# Patient Record
Sex: MALE | Race: WHITE | NOT HISPANIC OR LATINO | Employment: OTHER | URBAN - METROPOLITAN AREA
[De-identification: names, ages, dates, MRNs, and addresses within clinical notes are randomized per-mention and may not be internally consistent; named-entity substitution may affect disease eponyms.]

---

## 2018-05-22 LAB — CRC RECOMMENDATION EXT: NORMAL

## 2019-01-31 ENCOUNTER — HISTORICAL (OUTPATIENT)
Dept: ADMINISTRATIVE | Facility: HOSPITAL | Age: 69
End: 2019-01-31

## 2019-12-04 LAB
BUN SERPL-MCNC: 10 MG/DL (ref 7–18)
CALCIUM SERPL-MCNC: 9.3 MG/DL (ref 8.5–10.1)
CHLORIDE SERPL-SCNC: 105 MMOL/L (ref 98–107)
CO2 SERPL-SCNC: 28 MMOL/L (ref 21–32)
CREAT SERPL-MCNC: 0.91 MG/DL (ref 0.6–1.3)
GLUCOSE SERPL-MCNC: 145 MG/DL (ref 74–106)
POTASSIUM SERPL-SCNC: 4.5 MMOL/L (ref 3.5–5.1)
SODIUM SERPL-SCNC: 140 MEQ/L (ref 131–145)

## 2021-05-13 ENCOUNTER — HISTORICAL (OUTPATIENT)
Dept: ADMINISTRATIVE | Facility: HOSPITAL | Age: 71
End: 2021-05-13

## 2021-05-13 LAB
ABS NEUT (OLG): 4.14 X10(3)/MCL (ref 2.1–9.2)
ALBUMIN SERPL-MCNC: 3.8 GM/DL (ref 3.4–4.8)
ALBUMIN/GLOB SERPL: 1.3 RATIO (ref 1.1–2)
ALP SERPL-CCNC: 81 UNIT/L (ref 40–150)
ALT SERPL-CCNC: 20 UNIT/L (ref 0–55)
APPEARANCE, UA: CLEAR
AST SERPL-CCNC: 24 UNIT/L (ref 5–34)
BASOPHILS # BLD AUTO: 0.02 X10(3)/MCL (ref 0–0.2)
BASOPHILS NFR BLD AUTO: 0.3 % (ref 0–0.9)
BILIRUB SERPL-MCNC: 0.4 MG/DL (ref 0.2–1.2)
BILIRUB UR QL STRIP: NEGATIVE
BILIRUBIN DIRECT+TOT PNL SERPL-MCNC: 0.2 MG/DL (ref 0–0.5)
BILIRUBIN DIRECT+TOT PNL SERPL-MCNC: 0.2 MG/DL (ref 0–0.8)
BUN SERPL-MCNC: 11.9 MG/DL (ref 8.4–25.7)
CALCIUM SERPL-MCNC: 9 MG/DL (ref 8.8–10)
CHLORIDE SERPL-SCNC: 107 MMOL/L (ref 98–107)
CHOLEST SERPL-MCNC: 171 MG/DL
CHOLEST/HDLC SERPL: 3 {RATIO} (ref 0–5)
CO2 SERPL-SCNC: 25 MMOL/L (ref 23–31)
COLOR UR: YELLOW
CREAT SERPL-MCNC: 0.83 MG/DL (ref 0.72–1.25)
EOSINOPHIL # BLD AUTO: 0.26 X10(3)/MCL (ref 0–0.9)
EOSINOPHIL NFR BLD AUTO: 3.6 % (ref 0–6.5)
ERYTHROCYTE [DISTWIDTH] IN BLOOD BY AUTOMATED COUNT: 14.5 % (ref 11.5–17)
GLOBULIN SER-MCNC: 3 GM/DL (ref 2.4–3.5)
GLUCOSE (UA): NEGATIVE
GLUCOSE SERPL-MCNC: 106 MG/DL (ref 82–115)
HCT VFR BLD AUTO: 44 % (ref 42–52)
HDLC SERPL-MCNC: 51 MG/DL (ref 40–60)
HGB BLD-MCNC: 14.6 GM/DL (ref 14–18)
HGB UR QL STRIP: NEGATIVE
IMM GRANULOCYTES # BLD AUTO: 0.01 10*3/UL (ref 0–0.02)
IMM GRANULOCYTES NFR BLD AUTO: 0.1 % (ref 0–0.43)
KETONES UR QL STRIP: NEGATIVE
LDLC SERPL CALC-MCNC: 99 MG/DL (ref 50–140)
LEUKOCYTE ESTERASE UR QL STRIP: NEGATIVE
LYMPHOCYTES # BLD AUTO: 2.14 X10(3)/MCL (ref 0.6–4.6)
LYMPHOCYTES NFR BLD AUTO: 29.6 % (ref 16.2–38.3)
MCH RBC QN AUTO: 29.7 PG (ref 27–31)
MCHC RBC AUTO-ENTMCNC: 33.2 GM/DL (ref 33–36)
MCV RBC AUTO: 89.6 FL (ref 80–94)
MONOCYTES # BLD AUTO: 0.66 X10(3)/MCL (ref 0.1–1.3)
MONOCYTES NFR BLD AUTO: 9.1 % (ref 4.7–11.3)
NEUTROPHILS # BLD AUTO: 4.14 X10(3)/MCL (ref 2.1–9.2)
NEUTROPHILS NFR BLD AUTO: 57.3 % (ref 49.1–73.4)
NITRITE UR QL STRIP: NEGATIVE
NRBC BLD AUTO-RTO: 0 % (ref 0–0.2)
PH UR STRIP: 5.5 [PH] (ref 5–7)
PLATELET # BLD AUTO: 196 X10(3)/MCL (ref 130–400)
PMV BLD AUTO: 9.8 FL (ref 7.4–10.4)
POTASSIUM SERPL-SCNC: 4 MMOL/L (ref 3.5–5.1)
PROT SERPL-MCNC: 6.8 GM/DL (ref 5.8–7.6)
PROT UR QL STRIP: NEGATIVE
PSA SERPL-MCNC: 3.28 NG/ML
RBC # BLD AUTO: 4.91 X10(6)/MCL (ref 4.7–6.1)
SODIUM SERPL-SCNC: 139 MMOL/L (ref 136–145)
SP GR UR STRIP: 1.02 (ref 1–1.03)
TRIGL SERPL-MCNC: 104 MG/DL (ref 0–150)
UROBILINOGEN UR STRIP-ACNC: NEGATIVE
VLDLC SERPL CALC-MCNC: 21 MG/DL
WBC # SPEC AUTO: 7.2 X10(3)/MCL (ref 4.5–11.5)

## 2021-09-17 ENCOUNTER — HISTORICAL (OUTPATIENT)
Dept: ADMINISTRATIVE | Facility: HOSPITAL | Age: 71
End: 2021-09-17

## 2022-02-14 ENCOUNTER — HISTORICAL (OUTPATIENT)
Dept: ADMINISTRATIVE | Facility: HOSPITAL | Age: 72
End: 2022-02-14

## 2022-02-14 ENCOUNTER — HISTORICAL (OUTPATIENT)
Dept: RADIOLOGY | Facility: HOSPITAL | Age: 72
End: 2022-02-14

## 2022-02-15 ENCOUNTER — HISTORICAL (OUTPATIENT)
Dept: LAB | Facility: HOSPITAL | Age: 72
End: 2022-02-15

## 2022-02-15 LAB
ABS NEUT (OLG): 4.65 (ref 2.1–9.2)
BASOPHILS # BLD AUTO: 0.02 10*3/UL (ref 0–0.2)
BASOPHILS NFR BLD AUTO: 0.3 % (ref 0–0.9)
BUN SERPL-MCNC: 33.3 MG/DL (ref 8.4–25.7)
CALCIUM SERPL-MCNC: 9.1 MG/DL (ref 8.8–10)
CHLORIDE SERPL-SCNC: 107 MMOL/L (ref 98–107)
CO2 SERPL-SCNC: 28 MMOL/L (ref 23–31)
CREAT SERPL-MCNC: 0.92 MG/DL (ref 0.72–1.25)
CREAT/UREA NIT SERPL: 36
EOSINOPHIL # BLD AUTO: 0.11 10*3/UL (ref 0–0.9)
EOSINOPHIL NFR BLD AUTO: 1.4 % (ref 0–6.5)
ERYTHROCYTE [DISTWIDTH] IN BLOOD BY AUTOMATED COUNT: 14.1 % (ref 11.5–17)
GLUCOSE SERPL-MCNC: 151 MG/DL (ref 82–115)
HCT VFR BLD AUTO: 39.4 % (ref 42–52)
HEMOLYSIS INTERF INDEX SERPL-ACNC: 5
HGB BLD-MCNC: 13.2 G/DL (ref 14–18)
ICTERIC INTERF INDEX SERPL-ACNC: 1
IMM GRANULOCYTES # BLD AUTO: 0.02 10*3/UL (ref 0–0.02)
IMM GRANULOCYTES NFR BLD AUTO: 0.3 % (ref 0–0.43)
LIPEMIC INTERF INDEX SERPL-ACNC: 3
LYMPHOCYTES # BLD AUTO: 2.38 10*3/UL (ref 0.6–4.6)
LYMPHOCYTES NFR BLD AUTO: 30.7 % (ref 16.2–38.3)
MANUAL DIFF? (OHS): NO
MCH RBC QN AUTO: 30.5 PG (ref 27–31)
MCHC RBC AUTO-ENTMCNC: 33.5 G/DL (ref 33–36)
MCV RBC AUTO: 91 FL (ref 80–94)
MONOCYTES # BLD AUTO: 0.58 10*3/UL (ref 0.1–1.3)
MONOCYTES NFR BLD AUTO: 7.5 % (ref 4.7–11.3)
NEUTROPHILS # BLD AUTO: 4.65 10*3/UL (ref 2.1–9.2)
NEUTROPHILS NFR BLD AUTO: 59.8 % (ref 49.1–73.4)
NRBC BLD AUTO-RTO: 0 % (ref 0–0.2)
PLATELET # BLD AUTO: 206 10*3/UL (ref 130–400)
PMV BLD AUTO: 10.9 FL (ref 7.4–10.4)
POTASSIUM SERPL-SCNC: 4.2 MMOL/L (ref 3.5–5.1)
RBC # BLD AUTO: 4.33 10*6/UL (ref 4.7–6.1)
SODIUM SERPL-SCNC: 141 MMOL/L (ref 136–145)
WBC # SPEC AUTO: 7.8 10*3/UL (ref 4.5–11.5)

## 2022-02-18 ENCOUNTER — EXTERNAL HOSPITAL ADMISSION (OUTPATIENT)
Dept: ADMINISTRATIVE | Facility: CLINIC | Age: 72
End: 2022-02-18

## 2022-02-22 ENCOUNTER — HISTORICAL (OUTPATIENT)
Dept: LAB | Facility: HOSPITAL | Age: 72
End: 2022-02-22

## 2022-02-22 LAB
ABS NEUT (OLG): 4.54 (ref 2.1–9.2)
ALBUMIN SERPL-MCNC: 3.7 G/DL (ref 3.4–4.8)
ALBUMIN/GLOB SERPL: 1.2 {RATIO} (ref 1.1–2)
ALP SERPL-CCNC: 84 U/L (ref 40–150)
ALT SERPL-CCNC: 22 U/L (ref 0–55)
AST SERPL-CCNC: 24 U/L (ref 5–34)
BASOPHILS # BLD AUTO: 0.03 10*3/UL (ref 0–0.2)
BASOPHILS NFR BLD AUTO: 0.4 % (ref 0–0.9)
BILIRUB SERPL-MCNC: 0.3 MG/DL (ref 0.2–1.2)
BILIRUBIN DIRECT+TOT PNL SERPL-MCNC: 0.1 (ref 0–0.5)
BILIRUBIN DIRECT+TOT PNL SERPL-MCNC: 0.2 (ref 0–0.8)
BUN SERPL-MCNC: 10.6 MG/DL (ref 8.4–25.7)
CALCIUM SERPL-MCNC: 9.3 MG/DL (ref 8.8–10)
CHLORIDE SERPL-SCNC: 108 MMOL/L (ref 98–107)
CHOLEST SERPL-MCNC: 173 MG/DL
CHOLEST/HDLC SERPL: 4 {RATIO} (ref 0–5)
CO2 SERPL-SCNC: 28 MMOL/L (ref 23–31)
CREAT SERPL-MCNC: 0.88 MG/DL (ref 0.72–1.25)
EOSINOPHIL # BLD AUTO: 0.24 10*3/UL (ref 0–0.9)
EOSINOPHIL NFR BLD AUTO: 3.2 % (ref 0–6.5)
ERYTHROCYTE [DISTWIDTH] IN BLOOD BY AUTOMATED COUNT: 15.3 % (ref 11.5–17)
EST. AVERAGE GLUCOSE BLD GHB EST-MCNC: 111.2 MG/DL
GLOBULIN SER-MCNC: 3.1 G/DL (ref 2.4–3.5)
GLUCOSE SERPL-MCNC: 106 MG/DL (ref 82–115)
HBA1C MFR BLD: 5.5 %
HCT VFR BLD AUTO: 34.6 % (ref 42–52)
HDLC SERPL-MCNC: 49 MG/DL (ref 40–60)
HEMOLYSIS INTERF INDEX SERPL-ACNC: 4
HGB BLD-MCNC: 11.5 G/DL (ref 14–18)
ICTERIC INTERF INDEX SERPL-ACNC: 0
IMM GRANULOCYTES # BLD AUTO: 0.02 10*3/UL (ref 0–0.02)
IMM GRANULOCYTES NFR BLD AUTO: 0.3 % (ref 0–0.43)
LDLC SERPL CALC-MCNC: 98 MG/DL (ref 50–140)
LIPEMIC INTERF INDEX SERPL-ACNC: 8
LYMPHOCYTES # BLD AUTO: 1.97 10*3/UL (ref 0.6–4.6)
LYMPHOCYTES NFR BLD AUTO: 26 % (ref 16.2–38.3)
MANUAL DIFF? (OHS): NO
MCH RBC QN AUTO: 30.8 PG (ref 27–31)
MCHC RBC AUTO-ENTMCNC: 33.2 G/DL (ref 33–36)
MCV RBC AUTO: 92.8 FL (ref 80–94)
MONOCYTES # BLD AUTO: 0.78 10*3/UL (ref 0.1–1.3)
MONOCYTES NFR BLD AUTO: 10.3 % (ref 4.7–11.3)
NEUTROPHILS # BLD AUTO: 4.54 10*3/UL (ref 2.1–9.2)
NEUTROPHILS NFR BLD AUTO: 59.8 % (ref 49.1–73.4)
NRBC BLD AUTO-RTO: 0 % (ref 0–0.2)
PLATELET # BLD AUTO: 242 10*3/UL (ref 130–400)
PMV BLD AUTO: 10.3 FL (ref 7.4–10.4)
POTASSIUM SERPL-SCNC: 4.5 MMOL/L (ref 3.5–5.1)
PROT SERPL-MCNC: 6.8 G/DL (ref 5.8–7.6)
PSA SERPL-MCNC: 4.05 NG/ML
RBC # BLD AUTO: 3.73 10*6/UL (ref 4.7–6.1)
SODIUM SERPL-SCNC: 144 MMOL/L (ref 136–145)
TRIGL SERPL-MCNC: 129 MG/DL (ref 0–150)
VLDLC SERPL CALC-MCNC: 26 MG/DL
WBC # SPEC AUTO: 7.6 10*3/UL (ref 4.5–11.5)

## 2022-04-06 ENCOUNTER — HISTORICAL (OUTPATIENT)
Dept: SURGERY | Facility: HOSPITAL | Age: 72
End: 2022-04-06

## 2022-04-07 ENCOUNTER — HISTORICAL (OUTPATIENT)
Dept: ADMINISTRATIVE | Facility: HOSPITAL | Age: 72
End: 2022-04-07
Payer: MEDICARE

## 2022-04-23 VITALS
DIASTOLIC BLOOD PRESSURE: 74 MMHG | WEIGHT: 151.19 LBS | HEIGHT: 62 IN | SYSTOLIC BLOOD PRESSURE: 121 MMHG | BODY MASS INDEX: 27.82 KG/M2 | OXYGEN SATURATION: 99 %

## 2022-04-28 RX ORDER — TIZANIDINE HYDROCHLORIDE 2 MG/1
CAPSULE, GELATIN COATED ORAL
COMMUNITY
End: 2022-06-02 | Stop reason: SDUPTHER

## 2022-04-28 RX ORDER — ATORVASTATIN CALCIUM 10 MG/1
10 TABLET, FILM COATED ORAL NIGHTLY
COMMUNITY
End: 2023-01-31 | Stop reason: SDUPTHER

## 2022-04-28 RX ORDER — GABAPENTIN 100 MG/1
100 CAPSULE ORAL 3 TIMES DAILY
COMMUNITY
End: 2022-05-20

## 2022-04-28 RX ORDER — ONDANSETRON 4 MG/1
4 TABLET, ORALLY DISINTEGRATING ORAL ONCE
COMMUNITY
End: 2022-06-02 | Stop reason: SDUPTHER

## 2022-04-28 RX ORDER — ASCORBIC ACID 250 MG
1 TABLET,CHEWABLE ORAL DAILY
COMMUNITY

## 2022-04-28 RX ORDER — BUDESONIDE AND FORMOTEROL FUMARATE DIHYDRATE 160; 4.5 UG/1; UG/1
2 AEROSOL RESPIRATORY (INHALATION) EVERY 12 HOURS
COMMUNITY
End: 2022-05-13 | Stop reason: SDUPTHER

## 2022-04-28 RX ORDER — CHOLECALCIFEROL (VITAMIN D3) 25 MCG
1000 TABLET ORAL DAILY
COMMUNITY

## 2022-04-28 RX ORDER — FLUTICASONE PROPIONATE AND SALMETEROL 100; 50 UG/1; UG/1
1 POWDER RESPIRATORY (INHALATION) 2 TIMES DAILY
COMMUNITY
End: 2022-09-30

## 2022-04-28 RX ORDER — PANTOPRAZOLE SODIUM 40 MG/1
40 TABLET, DELAYED RELEASE ORAL 2 TIMES DAILY
COMMUNITY
End: 2022-05-09

## 2022-05-04 ENCOUNTER — ANESTHESIA (OUTPATIENT)
Dept: SURGERY | Facility: HOSPITAL | Age: 72
End: 2022-05-04
Payer: MEDICARE

## 2022-05-04 ENCOUNTER — HOSPITAL ENCOUNTER (OUTPATIENT)
Facility: HOSPITAL | Age: 72
Discharge: HOME | End: 2022-05-04
Attending: ANESTHESIOLOGY | Admitting: ANESTHESIOLOGY
Payer: MEDICARE

## 2022-05-04 ENCOUNTER — ANESTHESIA EVENT (OUTPATIENT)
Dept: SURGERY | Facility: HOSPITAL | Age: 72
End: 2022-05-04
Payer: MEDICARE

## 2022-05-04 ENCOUNTER — HOSPITAL ENCOUNTER (OUTPATIENT)
Dept: RADIOLOGY | Facility: HOSPITAL | Age: 72
Discharge: HOME | End: 2022-05-04
Attending: ANESTHESIOLOGY
Payer: MEDICARE

## 2022-05-04 DIAGNOSIS — M54.9 CHRONIC BACK PAIN GREATER THAN 3 MONTHS DURATION: Primary | ICD-10-CM

## 2022-05-04 DIAGNOSIS — R52 PAIN: ICD-10-CM

## 2022-05-04 DIAGNOSIS — G89.29 CHRONIC BACK PAIN GREATER THAN 3 MONTHS DURATION: Primary | ICD-10-CM

## 2022-05-04 PROCEDURE — 63600175 PHARM REV CODE 636 W HCPCS: Performed by: NURSE ANESTHETIST, CERTIFIED REGISTERED

## 2022-05-04 PROCEDURE — 71000015 HC POSTOP RECOV 1ST HR: Performed by: ANESTHESIOLOGY

## 2022-05-04 PROCEDURE — 64483 NJX AA&/STRD TFRM EPI L/S 1: CPT | Performed by: ANESTHESIOLOGY

## 2022-05-04 PROCEDURE — 64484 NJX AA&/STRD TFRM EPI L/S EA: CPT | Mod: LT,,, | Performed by: ANESTHESIOLOGY

## 2022-05-04 PROCEDURE — 62323 NJX INTERLAMINAR LMBR/SAC: CPT | Performed by: ANESTHESIOLOGY

## 2022-05-04 PROCEDURE — 36000704 HC OR TIME LEV I 1ST 15 MIN: Performed by: ANESTHESIOLOGY

## 2022-05-04 PROCEDURE — 37000008 HC ANESTHESIA 1ST 15 MINUTES: Performed by: ANESTHESIOLOGY

## 2022-05-04 PROCEDURE — 64483 NJX AA&/STRD TFRM EPI L/S 1: CPT | Mod: LT,,, | Performed by: ANESTHESIOLOGY

## 2022-05-04 PROCEDURE — 64484 PRA INJECT ANES/STEROID FORAMEN LUMBAR/SACRAL W IMG GUIDE ,EA ADD LEVEL: ICD-10-PCS | Mod: LT,,, | Performed by: ANESTHESIOLOGY

## 2022-05-04 PROCEDURE — 76000 FLUOROSCOPY <1 HR PHYS/QHP: CPT | Mod: TC

## 2022-05-04 PROCEDURE — 64483 PR EPIDURAL INJ, ANES/STEROID, TRANSFORAMINAL, LUMB/SACR, SNGL LEVL: ICD-10-PCS | Mod: LT,,, | Performed by: ANESTHESIOLOGY

## 2022-05-04 RX ORDER — HYDROMORPHONE HYDROCHLORIDE 2 MG/ML
0.2 INJECTION, SOLUTION INTRAMUSCULAR; INTRAVENOUS; SUBCUTANEOUS EVERY 5 MIN PRN
Status: CANCELLED | OUTPATIENT
Start: 2022-05-04

## 2022-05-04 RX ORDER — SODIUM CHLORIDE 0.9 % (FLUSH) 0.9 %
10 SYRINGE (ML) INJECTION
Status: DISCONTINUED | OUTPATIENT
Start: 2022-05-04 | End: 2022-05-04 | Stop reason: HOSPADM

## 2022-05-04 RX ORDER — SODIUM CHLORIDE, SODIUM GLUCONATE, SODIUM ACETATE, POTASSIUM CHLORIDE AND MAGNESIUM CHLORIDE 30; 37; 368; 526; 502 MG/100ML; MG/100ML; MG/100ML; MG/100ML; MG/100ML
1000 INJECTION, SOLUTION INTRAVENOUS CONTINUOUS
Status: DISCONTINUED | OUTPATIENT
Start: 2022-05-04 | End: 2022-05-04 | Stop reason: HOSPADM

## 2022-05-04 RX ORDER — PROPOFOL 10 MG/ML
INJECTION, EMULSION INTRAVENOUS
Status: DISCONTINUED | OUTPATIENT
Start: 2022-05-04 | End: 2022-05-04

## 2022-05-04 RX ORDER — LIDOCAINE HYDROCHLORIDE 10 MG/ML
1 INJECTION, SOLUTION EPIDURAL; INFILTRATION; INTRACAUDAL; PERINEURAL ONCE
Status: DISCONTINUED | OUTPATIENT
Start: 2022-05-04 | End: 2022-05-04 | Stop reason: HOSPADM

## 2022-05-04 RX ORDER — LIDOCAINE HCL/PF 100 MG/5ML
SYRINGE (ML) INTRAVENOUS
Status: DISCONTINUED | OUTPATIENT
Start: 2022-05-04 | End: 2022-05-04

## 2022-05-04 RX ORDER — BUPIVACAINE HYDROCHLORIDE 2.5 MG/ML
INJECTION, SOLUTION EPIDURAL; INFILTRATION; INTRACAUDAL
Status: DISCONTINUED
Start: 2022-05-04 | End: 2022-05-04 | Stop reason: HOSPADM

## 2022-05-04 RX ORDER — LIDOCAINE HYDROCHLORIDE 10 MG/ML
INJECTION, SOLUTION EPIDURAL; INFILTRATION; INTRACAUDAL; PERINEURAL
Status: DISCONTINUED
Start: 2022-05-04 | End: 2022-05-04 | Stop reason: HOSPADM

## 2022-05-04 RX ORDER — MIDAZOLAM HYDROCHLORIDE 1 MG/ML
2 INJECTION INTRAMUSCULAR; INTRAVENOUS ONCE AS NEEDED
Status: DISCONTINUED | OUTPATIENT
Start: 2022-05-04 | End: 2022-05-04 | Stop reason: HOSPADM

## 2022-05-04 RX ORDER — ONDANSETRON 2 MG/ML
4 INJECTION INTRAMUSCULAR; INTRAVENOUS DAILY PRN
Status: CANCELLED | OUTPATIENT
Start: 2022-05-04

## 2022-05-04 RX ORDER — DEXAMETHASONE SODIUM PHOSPHATE 10 MG/ML
INJECTION INTRAMUSCULAR; INTRAVENOUS
Status: DISCONTINUED
Start: 2022-05-04 | End: 2022-05-04 | Stop reason: HOSPADM

## 2022-05-04 RX ADMIN — PROPOFOL 50 MG: 10 INJECTION, EMULSION INTRAVENOUS at 09:05

## 2022-05-04 RX ADMIN — Medication 40 MG: at 09:05

## 2022-05-04 NOTE — TRANSFER OF CARE
"Anesthesia Transfer of Care Note    Patient: Reji Dozier    Procedure(s) Performed: Procedure(s) (LRB):  INJECTION, STEROID, EPIDURAL, TRANSFORAMINAL APPROACH L3 (Bilateral)    Patient location: OPS    Anesthesia Type: MAC    Transport from OR: Transported from OR on room air with adequate spontaneous ventilation    Post pain: adequate analgesia    Post assessment: no apparent anesthetic complications    Post vital signs: stable    Level of consciousness: sedated    Nausea/Vomiting: no nausea/vomiting    Complications: none    Transfer of care protocol was followed      Last vitals:   Visit Vitals  /82   Pulse 65   Temp 36.1 °C (97 °F) (Tympanic)   Resp 20   Ht 5' 2" (1.575 m)   Wt 68.4 kg (150 lb 12.7 oz)   BMI 27.58 kg/m²     "

## 2022-05-04 NOTE — DISCHARGE INSTRUCTIONS
Please call your Dr. If you have any problems with pain, bleeding, any signs of infection such as fever 102 or greater, procedure area redness, swelling, drainage, hard or hot to touch or anything of concern. Keep dressing clean, dry and intact till tomorrow evening. You can remove it at that time and shower. Do not tub bath for a few days. Do not submerge injection area. No powders, lotions or creams to injection area. Light activity for a few days. Use ice for pain and swelling. Do not use heat. No driving or legal decisions for 24 hours.

## 2022-05-04 NOTE — ANESTHESIA POSTPROCEDURE EVALUATION
Anesthesia Post Evaluation    Patient: Reji Dozier    Procedure(s) Performed: Procedure(s) (LRB):  INJECTION, STEROID, EPIDURAL, TRANSFORAMINAL APPROACH L3 (Bilateral)    Final Anesthesia Type: general      Patient location during evaluation: PACU  Patient participation: Yes- Able to Participate  Level of consciousness: awake and alert  Post-procedure vital signs: reviewed and stable  Pain management: adequate  Airway patency: patent      Anesthetic complications: no      Cardiovascular status: blood pressure returned to baseline  Respiratory status: unassisted  Hydration status: euvolemic  Follow-up not needed.          Vitals Value Taken Time   /68 05/04/22 1010   Temp 97.5 05/04/22 1243   Pulse 77 05/04/22 1010   Resp 18 05/04/22 1010   SpO2 99 % 05/04/22 1010         No case tracking events are documented in the log.      Pain/Tucker Score: Tucker Score: 10 (5/4/2022 10:15 AM)  Modified Tucker Score: 20 (5/4/2022 10:15 AM)

## 2022-05-04 NOTE — ANESTHESIA PREPROCEDURE EVALUATION
05/04/2022  Reji Dozier is a 71 y.o., male with chronic back pain for repeat epidural steroid injection.  He has done well with similar procedures in the recent past.  He is feeling well today, denies cardiopulmonary complaints..      Pre-op Assessment    I have reviewed the Patient Summary Reports.     I have reviewed the Nursing Notes. I have reviewed the NPO Status.   I have reviewed the Medications.     Review of Systems  Anesthesia Hx:  No problems with previous Anesthesia  Denies Family Hx of Anesthesia complications.   Denies Personal Hx of Anesthesia complications.   Cardiovascular:   Exercise tolerance: good    Pulmonary:   COPD, mild Asthma    Neurological:   Neuromuscular Disease,        Physical Exam  General: Well nourished, Cooperative, Alert and Oriented    Airway:  Mallampati: II   Mouth Opening: Normal  TM Distance: Normal  Tongue: Normal  Neck ROM: Normal ROM    Dental:  Intact    Chest/Lungs:  Clear to auscultation, Normal Respiratory Rate    Heart:  Rate: Normal  Rhythm: Regular Rhythm        Anesthesia Plan  Type of Anesthesia, risks & benefits discussed:    Anesthesia Type: Gen Natural Airway  Intra-op Monitoring Plan: Standard ASA Monitors  Post Op Pain Control Plan: multimodal analgesia  Induction:  IV  ASA Score: 3  Day of Surgery Review of History & Physical: H&P Update referred to the surgeon/provider.    Ready For Surgery From Anesthesia Perspective.     .      
PROVIDER:[TOKEN:[[id number]],FOLLOWUP:[1 month]]

## 2022-05-04 NOTE — OP NOTE
Procedure:  1.   Left L3 transforaminal epidural steroid injection  2.   Right L3 transforaminal epidural steroid injection    Pre-Procedure Diagnoses:  1. Chronic back pain greater than 3 months  2. Lumbar degenerative disc disease  3. Lumbar spondylosis  4. Lumbar disc displacement    Post-Procedure Diagnoses:  1. Chronic back pain greater than 3 months  2. Lumbar degenerative disc disease  3. Lumbar spondylosis  4. Lumbar disc displacement    Anesthesia:  Local and MAC    Estimated Blood Loss:  < 2 ML    Consent:  The procedure, risks, benefits, and alternatives were discussed with the patient.  The patient voiced understanding and fully informed written consent was obtained.    Description of the Procedure:  The patient was taken to the operating room and placed in the prone position. The skin overlying the lumbar spine was prepped with Chloraprep and draped in the usual sterile fashion.  An oblique fluoroscopic view was obtained on the left side at L3, with the superior articular process of the inferior vertebral body aligned with the pedicle.  Skin anesthesia was achieved using 2 mL of lidocaine 1%.  A 22-gauge 3.5-inch Quinke spinal needle was inserted and advanced under intermittent fluoroscopic views into the epidural space. Proper needle position was confirmed under AP, oblique, and lateral fluoroscopic views.  Negative aspiration for blood or CSF was confirmed. 1 mL of contrast was injected, which revealed spread into the epidural space.  Then a combination of 5 mg of dexamethasone with 1 mL of 0.25% bupivacaine was easily injected.  There was no pain on injection. The needle was removed intact and bleeding was nil.  The same procedure was repeated in identical fashion on the right side at L3. Sterile bandages were applied. The patient was taken to the recovery room for further observation in stable condition. The patient was then discharged home with no complications.

## 2022-05-05 VITALS
WEIGHT: 150.81 LBS | SYSTOLIC BLOOD PRESSURE: 130 MMHG | OXYGEN SATURATION: 99 % | TEMPERATURE: 97 F | DIASTOLIC BLOOD PRESSURE: 68 MMHG | RESPIRATION RATE: 18 BRPM | HEART RATE: 77 BPM | HEIGHT: 62 IN | BODY MASS INDEX: 27.75 KG/M2

## 2022-05-09 DIAGNOSIS — J44.9 COPD, MILD: Primary | ICD-10-CM

## 2022-05-09 RX ORDER — PANTOPRAZOLE SODIUM 40 MG/1
40 TABLET, DELAYED RELEASE ORAL DAILY
Qty: 90 TABLET | Refills: 3 | Status: SHIPPED | OUTPATIENT
Start: 2022-05-09 | End: 2022-06-24

## 2022-05-10 RX ORDER — AZITHROMYCIN 250 MG/1
TABLET, FILM COATED ORAL
Qty: 6 TABLET | Refills: 0 | Status: SHIPPED | OUTPATIENT
Start: 2022-05-10 | End: 2022-05-15

## 2022-05-10 RX ORDER — METHYLPREDNISOLONE 4 MG/1
TABLET ORAL
Qty: 21 EACH | Refills: 0 | Status: SHIPPED | OUTPATIENT
Start: 2022-05-10 | End: 2022-05-31

## 2022-05-10 RX ORDER — PROMETHAZINE HYDROCHLORIDE AND CODEINE PHOSPHATE 6.25; 1 MG/5ML; MG/5ML
5 SOLUTION ORAL EVERY 6 HOURS PRN
Qty: 120 ML | Refills: 0 | Status: SHIPPED | OUTPATIENT
Start: 2022-05-10 | End: 2022-05-20

## 2022-05-10 RX ORDER — IPRATROPIUM BROMIDE AND ALBUTEROL SULFATE 2.5; .5 MG/3ML; MG/3ML
3 SOLUTION RESPIRATORY (INHALATION) EVERY 6 HOURS PRN
Qty: 75 ML | Refills: 0 | Status: SHIPPED | OUTPATIENT
Start: 2022-05-10 | End: 2022-05-13 | Stop reason: SDUPTHER

## 2022-05-10 NOTE — TELEPHONE ENCOUNTER
Patient is requesting a refill of cough syrup   He has a cough, his sinus is dripping and he is little congested . Denies Fever. He tested negative for Covid (At Home Test)      ----- Message from Capo Lieberman MA sent at 5/10/2022  2:09 PM CDT -----  Regarding: medical advice  .Type:  Needs Medical Advice    Who Called: patient   Symptoms (please be specific): cough, congestion, body aches  How long has patient had these symptoms:  day before yesterday   Pharmacy name and phone #:  [phone] [facility]   Would the patient rather a call back or a response via [facility]? Call   Best Call Back Number: [phone]  Additional Information: patient stated wife was tested for covid a few days ago, with the same symptoms and was negative, he has been tested as well at least one week ago, but having symptoms, patient wants to know if he can get a nurse to call him back, or have medication called in, and also requesting for cough medication.

## 2022-05-10 NOTE — TELEPHONE ENCOUNTER
I have signed for the following orders AND/OR meds. Please notify the patient and ask the patient to schedule the testing and/or information about any medications that were sent.      Medications Ordered This Encounter   Medications    albuterol-ipratropium (DUO-NEB) 2.5 mg-0.5 mg/3 mL nebulizer solution     Sig: Take 3 mLs by nebulization every 6 (six) hours as needed for Wheezing. Rescue     Dispense:  75 mL     Refill:  0    azithromycin (Z-PAVEL) 250 MG tablet     Sig: Take 2 tablets by mouth on day 1; Take 1 tablet by mouth on days 2-5     Dispense:  6 tablet     Refill:  0    methylPREDNISolone (MEDROL DOSEPACK) 4 mg tablet     Sig: use as directed     Dispense:  21 each     Refill:  0    pantoprazole (PROTONIX) 40 MG tablet     Sig: Take 1 tablet (40 mg total) by mouth once daily.     Dispense:  90 tablet     Refill:  3    promethazine-codeine 6.25-10 mg/5 ml (PHENERGAN WITH CODEINE) 6.25-10 mg/5 mL syrup     Sig: Take 5 mLs by mouth every 6 (six) hours as needed for Cough.     Dispense:  120 mL     Refill:  0     Quantity prescribed more than 7 day supply? Yes, quantity medically necessary     Order Specific Question:   I have reviewed the Prescription Drug Monitoring Program (PDMP) database for this patient prior to prescribing the above opioid medication     Answer:   Yes     No orders of the defined types were placed in this encounter.

## 2022-05-11 DIAGNOSIS — J44.9 COPD, MILD: ICD-10-CM

## 2022-05-11 RX ORDER — FLUTICASONE PROPIONATE 50 MCG
SPRAY, SUSPENSION (ML) NASAL
Qty: 48 ML | Refills: 3 | Status: SHIPPED | OUTPATIENT
Start: 2022-05-11 | End: 2022-05-11 | Stop reason: SDUPTHER

## 2022-05-11 NOTE — TELEPHONE ENCOUNTER
Patient has refills at the pharmacy for Albuterol per rx sent in Powerchart   Tried contacting patient. No answer    ----- Message from Capo Lieberman MA sent at 5/11/2022  3:53 PM CDT -----  Regarding: medication refill  Patient stated he needs to get his breathing medication supplies       .Type:  RX Refill Request    Who Called: patient   Refill or New Rx: refill   RX Name and Strength: Flonase   How is the patient currently taking it? (ex. 1XDay): 2x daily   Is this a 30 day or 90 day RX: 30, but normally gets it 3 at a time   Preferred Pharmacy with phone number: [phone]   Local or Mail Order:local   Ordering Provider:Dr. Welch   Would the patient rather a call back or a response via [facility]? Call   Best Call Back Number:[phone]  Additional Information: patient need refill        ..Type:  RX Refill Request    Who Called:  patient   Refill or New Rx: refill   RX Name and Strength: albuterol   How is the patient currently taking it? (ex. 1XDay): daily use   Is this a 30 day or 90 day RX: 30   Preferred Pharmacy with phone number: [phone]   Local or Mail Order: local   Ordering Provider: Dr. Welch   Would the patient rather a call back or a response via [facility]? Call   Best Call Back Number:[phone]  Additional Information: patient need refill

## 2022-05-13 RX ORDER — FLUTICASONE PROPIONATE 50 MCG
SPRAY, SUSPENSION (ML) NASAL
Qty: 48 ML | Refills: 3 | Status: SHIPPED | OUTPATIENT
Start: 2022-05-13 | End: 2023-08-03

## 2022-05-13 RX ORDER — ALBUTEROL SULFATE 90 UG/1
2 AEROSOL, METERED RESPIRATORY (INHALATION) EVERY 6 HOURS PRN
Qty: 18 G | Refills: 1 | Status: SHIPPED | OUTPATIENT
Start: 2022-05-13 | End: 2022-05-16 | Stop reason: SDUPTHER

## 2022-05-13 RX ORDER — IPRATROPIUM BROMIDE AND ALBUTEROL SULFATE 2.5; .5 MG/3ML; MG/3ML
3 SOLUTION RESPIRATORY (INHALATION) EVERY 6 HOURS PRN
Qty: 75 ML | Refills: 0 | Status: SHIPPED | OUTPATIENT
Start: 2022-05-13 | End: 2022-05-16

## 2022-05-13 RX ORDER — BUDESONIDE AND FORMOTEROL FUMARATE DIHYDRATE 160; 4.5 UG/1; UG/1
2 AEROSOL RESPIRATORY (INHALATION) EVERY 12 HOURS
Qty: 10.2 G | Refills: 3 | Status: SHIPPED | OUTPATIENT
Start: 2022-05-13 | End: 2022-11-07

## 2022-05-13 NOTE — TELEPHONE ENCOUNTER
I have signed for the following orders AND/OR meds. Please notify the patient and ask the patient to schedule the testing and/or information about any medications that were sent.      Medications Ordered This Encounter   Medications    albuterol (PROAIR HFA) 90 mcg/actuation inhaler     Sig: Inhale 2 puffs into the lungs every 6 (six) hours as needed for Wheezing.     Dispense:  18 g     Refill:  1    albuterol-ipratropium (DUO-NEB) 2.5 mg-0.5 mg/3 mL nebulizer solution     Sig: Take 3 mLs by nebulization every 6 (six) hours as needed for Wheezing. Rescue     Dispense:  75 mL     Refill:  0    budesonide-formoterol 160-4.5 mcg (SYMBICORT) 160-4.5 mcg/actuation HFAA     Sig: Inhale 2 puffs into the lungs every 12 (twelve) hours. Controller     Dispense:  10.2 g     Refill:  3    fluticasone propionate (FLONASE) 50 mcg/actuation nasal spray     Sig: SPRAY 2 SPRAYS INTO EACH NOSTRIL TWICE A DAY     Dispense:  48 mL     Refill:  3     No orders of the defined types were placed in this encounter.

## 2022-05-16 DIAGNOSIS — J44.9 COPD, MILD: ICD-10-CM

## 2022-05-16 RX ORDER — IPRATROPIUM BROMIDE AND ALBUTEROL SULFATE 2.5; .5 MG/3ML; MG/3ML
3 SOLUTION RESPIRATORY (INHALATION) EVERY 6 HOURS PRN
Qty: 90 ML | Refills: 0 | Status: SHIPPED | OUTPATIENT
Start: 2022-05-16 | End: 2022-05-16

## 2022-05-16 RX ORDER — IPRATROPIUM BROMIDE AND ALBUTEROL SULFATE 2.5; .5 MG/3ML; MG/3ML
3 SOLUTION RESPIRATORY (INHALATION) EVERY 6 HOURS PRN
Qty: 75 ML | Refills: 0 | Status: CANCELLED | OUTPATIENT
Start: 2022-05-16 | End: 2023-05-13

## 2022-05-16 RX ORDER — IPRATROPIUM BROMIDE AND ALBUTEROL SULFATE 2.5; .5 MG/3ML; MG/3ML
3 SOLUTION RESPIRATORY (INHALATION)
COMMUNITY
Start: 2021-05-19 | End: 2022-05-16

## 2022-05-16 RX ORDER — IPRATROPIUM BROMIDE AND ALBUTEROL SULFATE 2.5; .5 MG/3ML; MG/3ML
3 SOLUTION RESPIRATORY (INHALATION) EVERY 6 HOURS PRN
Qty: 75 ML | Refills: 0 | Status: SHIPPED | OUTPATIENT
Start: 2022-05-16 | End: 2022-05-16

## 2022-05-16 NOTE — TELEPHONE ENCOUNTER
Patient is requesting a refill of the following:  Ipratropium Bromide 0.5 mg and Albuterol Sulfate 3 mg

## 2022-05-20 RX ORDER — GABAPENTIN 100 MG/1
100 CAPSULE ORAL DAILY
Qty: 30 CAPSULE | Refills: 6 | Status: SHIPPED | OUTPATIENT
Start: 2022-05-20 | End: 2023-01-24

## 2022-05-20 NOTE — TELEPHONE ENCOUNTER
Type:  RX Refill Request    Who Called: self  Refill or New Rx:refill  RX Name and Strength:promethazine-codeine    How is the patient currently taking it? (ex. 1XDay):segun  Is this a 30 day or 90 day RX:segun  Preferred Pharmacy with phone number:[facility]  Local or Mail Order:local  Ordering Provider:segun  Would the patient rather a call back or a response via [facility]? Call back  Best Call Back Number:[phone]  Additional Information: S

## 2022-05-21 NOTE — HISTORICAL [facility] CERNER
This is a historical note converted from Adri. Formatting and pictures may have been removed.  Please reference Adri for original formatting and attached multimedia. Procedure Name  1. Left L3?Transforaminal Epidural Steroid Injection  2. Right L3 Transforaminal Epidural Steroid Injection  ?  Pre-Procedure Diagnoses:  1. Chronic pain syndrome  2. Low back pain  3. Lumbar radiculopathy  4. Lumbar disc displacement  5. Lumbar degenerative disc disease  ?   Post-Procedure Diagnoses:  1. Chronic pain syndrome  2. Low back pain  3. Lumbar radiculopathy  4. Lumbar disc displacement  5. Lumbar degenerative disc disease  ?   Anesthesia:  Local and MAC  ?   Estimated Blood Loss:  None  ?  Complications:  None  ?  Informed Consent:  The procedure, risks, benefits, and alternatives were discussed with the patient.? There were no contraindications to the procedure.? The patient expressed understanding and agreed to proceed.? Fully informed written consent was obtained.?  ?  Description of the Procedure:  The patient was taken to the operating room.? IV access was obtained prior to the start of the procedure.? The patient was positioned prone on the fluoroscopy table.? Continuous hemodynamic monitoring was initiated and continued throughout the duration of the procedure.? The skin overlying the lumbosacral spine was prepped with Chloraprep and draped into a sterile field.? An oblique fluoroscopic view was obtained on the left side at L3, with the superior articular process of the inferior vertebral body aligned with the pedicle.? Skin anesthesia was achieved using 1 mL of 1% lidocaine.? A 22-gauge 3.5-inch Quinke spinal needle was slowly inserted and advanced towards the 6 oclock position of the pedicle and into the epidural space.? Proper needle position was confirmed under AP, oblique, and lateral fluoroscopic views.? Negative aspiration for blood or CSF was confirmed.? 0.5 mL of Isovue contrast was injected.? Fluoroscopic  imaging revealed a clear outline of the spinal nerve with proximal spread of agent through the neural foramen and into the epidural space.? Then a combination of 5 mg of dexamethasone and 1 mL of 0.25% bupivacaine was easily injected.? There was no pain on injection.? The needle was removed intact and bleeding was nil.? The same procedure was repeated in identical fashion on the right side at L3. Sterile bandages were applied.? The patient was taken to the recovery room for further observation in stable condition.? The patient was then discharged home without any complications.

## 2022-05-23 ENCOUNTER — TELEPHONE (OUTPATIENT)
Dept: FAMILY MEDICINE | Facility: CLINIC | Age: 72
End: 2022-05-23
Payer: MEDICARE

## 2022-05-23 DIAGNOSIS — R97.20 ELEVATED PROSTATE SPECIFIC ANTIGEN (PSA): Primary | ICD-10-CM

## 2022-05-23 DIAGNOSIS — Z12.5 ENCOUNTER FOR SCREENING FOR MALIGNANT NEOPLASM OF PROSTATE: ICD-10-CM

## 2022-05-23 DIAGNOSIS — R97.20 ELEVATED PSA: Primary | ICD-10-CM

## 2022-05-23 DIAGNOSIS — R97.20 ELEVATED PSA: ICD-10-CM

## 2022-05-24 RX ORDER — HYDROCODONE POLISTIREX AND CHLORPHENIRAMINE POLISTIREX 10; 8 MG/5ML; MG/5ML
5 SUSPENSION, EXTENDED RELEASE ORAL NIGHTLY PRN
Qty: 115 ML | Refills: 0 | Status: SHIPPED | OUTPATIENT
Start: 2022-05-24 | End: 2022-05-31

## 2022-05-24 NOTE — TELEPHONE ENCOUNTER
Noted     ----- Message from Wade Ahn sent at 5/24/2022  7:40 AM CDT -----  .Type:  Needs Medical Advice    Who Called: Reji  Symptoms (please be specific):    How long has patient had these symptoms:    Pharmacy name and phone #:    Would the patient rather a call back or a response via [facility]?   Best Call Back Number: [phone]  Additional Information: The patient wanted to let the office know he put a letter under the office door this morning.

## 2022-05-24 NOTE — TELEPHONE ENCOUNTER
I have signed for the following orders AND/OR meds. Please notify the patient and ask the patient to schedule the testing and/or information about any medications that were sent.      Medications Ordered This Encounter   Medications    hydrocodone-chlorpheniramine (TUSSIONEX) 10-8 mg/5 mL suspension     Sig: Take 5 mLs by mouth nightly as needed for Cough.     Dispense:  115 mL     Refill:  0     Quantity prescribed more than 7 day supply? Yes, quantity medically necessary     Order Specific Question:   I have reviewed the Prescription Drug Monitoring Program (PDMP) database for this patient prior to prescribing the above opioid medication     Answer:   Yes     No orders of the defined types were placed in this encounter.      Please call patient's pharmacy  -confirm they have 90 days Rx for symbicort, fluticasone and nebulizer dispensed.  -if not, then I will need to send, please help to propose  -once completed please call patient to update him of what we have done to correct the problems and please let him know that I apologize for the inconvenience

## 2022-05-25 ENCOUNTER — TELEPHONE (OUTPATIENT)
Dept: FAMILY MEDICINE | Facility: CLINIC | Age: 72
End: 2022-05-25
Payer: MEDICARE

## 2022-05-25 DIAGNOSIS — J44.9 COPD, MILD: ICD-10-CM

## 2022-05-25 NOTE — TELEPHONE ENCOUNTER
Please call patient pharmacy  -does he have 90 day supply of his inhalers and nebulizer solution? If not, please verbal 90 day supply and 3 refills  thanks

## 2022-05-25 NOTE — TELEPHONE ENCOUNTER
----- Message from Beulah Benítez MA sent at 5/25/2022 11:44 AM CDT -----  Regarding: FW: Advice    ----- Message -----  From: Christine Palma  Sent: 5/25/2022   8:46 AM CDT  To: Rebekah Núñez Staff  Subject: Advice                                           Type:  Needs Medical Advice    Who Called: Patient  Best Call Back Number: [phone]  Additional Information: Just wanted to say thank you to Dr. Welch for refilling his cough medication he has shown great improvement.

## 2022-05-26 ENCOUNTER — OFFICE VISIT (OUTPATIENT)
Dept: ORTHOPEDICS | Facility: CLINIC | Age: 72
End: 2022-05-26
Payer: MEDICARE

## 2022-05-26 ENCOUNTER — OFFICE VISIT (OUTPATIENT)
Dept: FAMILY MEDICINE | Facility: CLINIC | Age: 72
End: 2022-05-26
Payer: MEDICARE

## 2022-05-26 VITALS
HEIGHT: 62 IN | DIASTOLIC BLOOD PRESSURE: 86 MMHG | WEIGHT: 150 LBS | BODY MASS INDEX: 27.6 KG/M2 | SYSTOLIC BLOOD PRESSURE: 161 MMHG | HEART RATE: 80 BPM

## 2022-05-26 VITALS
WEIGHT: 154 LBS | BODY MASS INDEX: 27.29 KG/M2 | HEIGHT: 63 IN | DIASTOLIC BLOOD PRESSURE: 71 MMHG | HEART RATE: 72 BPM | TEMPERATURE: 99 F | SYSTOLIC BLOOD PRESSURE: 156 MMHG

## 2022-05-26 DIAGNOSIS — Z00.00 WELLNESS EXAMINATION: ICD-10-CM

## 2022-05-26 DIAGNOSIS — G89.29 CHRONIC BACK PAIN GREATER THAN 3 MONTHS DURATION: Primary | ICD-10-CM

## 2022-05-26 DIAGNOSIS — M51.26 DISC DISPLACEMENT, LUMBAR: ICD-10-CM

## 2022-05-26 DIAGNOSIS — Z12.5 ENCOUNTER FOR SCREENING FOR MALIGNANT NEOPLASM OF PROSTATE: ICD-10-CM

## 2022-05-26 DIAGNOSIS — E78.5 HYPERLIPIDEMIA, UNSPECIFIED HYPERLIPIDEMIA TYPE: ICD-10-CM

## 2022-05-26 DIAGNOSIS — Z11.59 ENCOUNTER FOR HEPATITIS C SCREENING TEST FOR LOW RISK PATIENT: ICD-10-CM

## 2022-05-26 DIAGNOSIS — J44.9 MODERATE COPD (CHRONIC OBSTRUCTIVE PULMONARY DISEASE): ICD-10-CM

## 2022-05-26 DIAGNOSIS — M54.9 CHRONIC BACK PAIN GREATER THAN 3 MONTHS DURATION: Primary | ICD-10-CM

## 2022-05-26 DIAGNOSIS — Z13.6 SCREENING FOR AAA (ABDOMINAL AORTIC ANEURYSM): ICD-10-CM

## 2022-05-26 DIAGNOSIS — R35.1 NOCTURIA: ICD-10-CM

## 2022-05-26 DIAGNOSIS — M54.16 LUMBAR RADICULITIS: ICD-10-CM

## 2022-05-26 PROCEDURE — 99214 OFFICE O/P EST MOD 30 MIN: CPT | Mod: ,,, | Performed by: INTERNAL MEDICINE

## 2022-05-26 PROCEDURE — 99214 PR OFFICE/OUTPT VISIT, EST, LEVL IV, 30-39 MIN: ICD-10-PCS | Mod: ,,, | Performed by: INTERNAL MEDICINE

## 2022-05-26 PROCEDURE — 99214 PR OFFICE/OUTPT VISIT, EST, LEVL IV, 30-39 MIN: ICD-10-PCS | Mod: ,,, | Performed by: ANESTHESIOLOGY

## 2022-05-26 PROCEDURE — 99214 OFFICE O/P EST MOD 30 MIN: CPT | Mod: ,,, | Performed by: ANESTHESIOLOGY

## 2022-05-26 RX ORDER — LORATADINE 10 MG/1
10 TABLET ORAL DAILY
COMMUNITY
Start: 2022-05-22 | End: 2022-06-01

## 2022-05-26 RX ORDER — BENZONATATE 100 MG/1
100 CAPSULE ORAL 2 TIMES DAILY
COMMUNITY
Start: 2022-05-22 | End: 2022-05-29

## 2022-05-26 RX ORDER — CEFDINIR 300 MG/1
300 CAPSULE ORAL 2 TIMES DAILY
COMMUNITY
Start: 2022-05-15 | End: 2022-06-24

## 2022-05-26 RX ORDER — ONDANSETRON 4 MG/1
4 TABLET, FILM COATED ORAL
COMMUNITY
Start: 2022-02-15

## 2022-05-26 RX ORDER — BUDESONIDE AND FORMOTEROL FUMARATE DIHYDRATE 160; 4.5 UG/1; UG/1
160 AEROSOL RESPIRATORY (INHALATION) 2 TIMES DAILY
COMMUNITY
Start: 2021-04-06 | End: 2022-06-02 | Stop reason: SDUPTHER

## 2022-05-26 RX ORDER — ALBUTEROL SULFATE 90 UG/1
6.7 AEROSOL, METERED RESPIRATORY (INHALATION)
COMMUNITY
Start: 2022-02-21 | End: 2023-05-03 | Stop reason: SDUPTHER

## 2022-05-26 NOTE — PROGRESS NOTES
Subjective:      Patient ID: Reji Dozier is a 71 y.o. male.    Chief Complaint: Follow-up (No LABS )    Disclaimer:  This note is prepared using voice recognition software and as such is likely to have errors and has not been proof read. Please contact me for questions.     HPI     Patient is a 71 year old WM here today for a follow up visit for his COPD exacerbation. Currently completing steroids + abx, doing better today, says cough is still present but feels less SOB than before. Negative for COVID, Flu, RSV, seen at [facility] ED x 2 over past 2 weeks.    COPD: compliant with symbicort + albuterol PRN    HLD: compliant with statin, no myalgias    Lower back pain and RLE radiculopathy:  S/p FABY with Dr. Hdz    flu shot: due at local pharmacy 2021  COVID 19 vaccine: completed x 2 doses Moderna  Pneumovax and Prevnar completed      Lab Results   Component Value Date    HGBA1C 5.5 02/22/2022    HGBA1C 6.1 02/10/2020      Lab Results   Component Value Date    CHOL 173 02/22/2022    CHOL 171 05/13/2021     No results found for: LDLCALC    Wt Readings from Last 10 Encounters:   05/26/22 69.9 kg (154 lb)   05/26/22 68 kg (150 lb)   05/04/22 68.4 kg (150 lb 12.7 oz)   11/23/21 68.6 kg (151 lb 3.1 oz)       The 10-year ASCVD risk score (Taya SAM Jr., et al., 2013) is: 23.2%*    Values used to calculate the score:      Age: 71 years      Sex: Male      Is Non- : No      Diabetic: No      Tobacco smoker: No      Systolic Blood Pressure: 156 mmHg      Is BP treated: No      HDL Cholesterol: 49 mg/dL*      Total Cholesterol: 173 mg/dL*      * - Cholesterol units were assumed for this score calculation        Lab Results   Component Value Date    WBC 7.6 02/22/2022    HGB 11.5 02/22/2022    HCT 34.6 02/22/2022     02/22/2022    CHOL 173 02/22/2022    TRIG 129 02/22/2022    HDL 49 02/22/2022    ALT 22 02/22/2022    AST 24 02/22/2022     02/22/2022    K 4.5 02/22/2022    CREATININE 0.88  "02/22/2022    BUN 10.6 02/22/2022    CO2 28 02/22/2022    PSA 4.05 02/22/2022    INR 1.0 02/15/2022    HGBA1C 5.5 02/22/2022       SURG FL Surgery Fluoro Usage  See OP Notes for results.     IMPRESSION: See OP Notes for results.     This procedure was auto-finalized by: Virtual Radiologist        Review of Systems   Constitutional: Negative for chills, fatigue and fever.   HENT: Negative for congestion, ear pain, postnasal drip, rhinorrhea, sinus pressure and sore throat.    Eyes: Negative for itching and visual disturbance.   Respiratory: Positive for cough, shortness of breath and wheezing.    Cardiovascular: Negative for chest pain, palpitations and leg swelling.   Gastrointestinal: Negative for abdominal pain and nausea.   Genitourinary: Negative for dysuria.   Musculoskeletal: Negative for arthralgias and myalgias.   Skin: Negative for rash.   Neurological: Negative for weakness, light-headedness and headaches.     Objective:     Vitals:    05/26/22 1049   BP: (!) 156/71   BP Location: Right arm   Patient Position: Sitting   BP Method: Large (Automatic)   Pulse: 72   Temp: 98.8 °F (37.1 °C)   TempSrc: Oral   Weight: 69.9 kg (154 lb)   Height: 5' 3" (1.6 m)     Physical Exam  Vitals and nursing note reviewed.   Constitutional:       General: He is not in acute distress.     Appearance: He is well-developed. He is not diaphoretic.   HENT:      Head: Normocephalic and atraumatic.   Eyes:      Conjunctiva/sclera: Conjunctivae normal.      Pupils: Pupils are equal, round, and reactive to light.   Neck:      Thyroid: No thyromegaly.   Cardiovascular:      Rate and Rhythm: Normal rate and regular rhythm.      Heart sounds: Normal heart sounds. No murmur heard.  Pulmonary:      Effort: Pulmonary effort is normal. No respiratory distress.      Breath sounds: Wheezing present. No rales.   Musculoskeletal:      Cervical back: Normal range of motion and neck supple.   Lymphadenopathy:      Cervical: No cervical adenopathy. "   Skin:     General: Skin is warm and dry.   Neurological:      Mental Status: He is alert and oriented to person, place, and time.       Assessment:     1. Chronic back pain greater than 3 months duration    2. Moderate COPD (chronic obstructive pulmonary disease)    3. Hyperlipidemia, unspecified hyperlipidemia type    4. Wellness examination    5. Nocturia    6. Encounter for hepatitis C screening test for low risk patient    7. Encounter for screening for malignant neoplasm of prostate     8. Screening for AAA (abdominal aortic aneurysm)      Plan:   Reji was seen today for follow-up.    Diagnoses and all orders for this visit:    Chronic back pain greater than 3 months duration    Moderate COPD (chronic obstructive pulmonary disease)  -     Comprehensive Metabolic Panel; Future  -     Lipid Panel; Future  -     CBC Auto Differential; Future  -     Urinalysis; Future  -     PSA, Screening; Future    Hyperlipidemia, unspecified hyperlipidemia type  -     Comprehensive Metabolic Panel; Future  -     Lipid Panel; Future  -     CBC Auto Differential; Future  -     Urinalysis; Future  -     PSA, Screening; Future    Wellness examination  -     Comprehensive Metabolic Panel; Future  -     Lipid Panel; Future  -     CBC Auto Differential; Future  -     Urinalysis; Future  -     PSA, Screening; Future    Nocturia  -     Urinalysis; Future  -     PSA, Screening; Future    Encounter for hepatitis C screening test for low risk patient  -     Hepatitis C Antibody; Future    Encounter for screening for malignant neoplasm of prostate   -     PSA, Screening; Future    Screening for AAA (abdominal aortic aneurysm)  -     US Abdominal Aorta; Future      Patient with recent URI- feeling better but still with expiratory wheezing on exam today- he has a few days left of abx/steroids  i'd like to see him back in 1 week to ensure he's doing better  We did call pharmacy to clarify that his meds were properly sent including inhalers.        Follow up in about 6 months (around 11/26/2022) for Annual/Annual Wellness.    There are no Patient Instructions on file for this visit.

## 2022-05-26 NOTE — PROGRESS NOTES
Bronwyn Hdz MD        PATIENT NAME: Reji Dozier  : 1950  DATE: 22  MRN: [id number]      Billing Provider: Bronwyn Hdz MD  Level of Service:   Patient PCP Information     Provider PCP Type    Wilton Welch MD General          Reason for Visit / Chief Complaint: Follow-up (3 wk post Bilateral L3 TFESI, 22. Pt states much improvement after inj, no back pain at this time. )       Update PCP  Update Chief Complaint         History of Present Illness / Problem Focused Workflow     Reji Dozier presents to the clinic with Follow-up (3 wk post Bilateral L3 TFESI, 22. Pt states much improvement after inj, no back pain at this time. )     He is currently doing very well since bilateral L3 TFESI 3 weeks ago, has no complaints of any pain at this time  Did catch bronchitis a couple weeks ago and extreme coughing did aggravate his back some, but has now resolved    Follow-up        Review of Systems     Review of Systems   All other systems reviewed and are negative.       Medical / Social / Family History     Past Medical History:   Diagnosis Date    Arthritis     hip    Asthma     Chronic back pain     COPD (chronic obstructive pulmonary disease)     Dyslipidemia     Elevated PSA measurement     Lower extremity weakness     Lumbar radiculitis     Lumbar spinal stenosis     Mixed hyperlipidemia     Nocturia     Radicular low back pain     Skin lesion     Umbilical hernia        Past Surgical History:   Procedure Laterality Date    COLONOSCOPY      control bleeding      of gastrointestinal tract    ESOPHAGOGASTRODUODENOSCOPY      HEMORRHOID SURGERY      HERNIA REPAIR      INJECTION OF FACET JOINT Bilateral     SCLEROTHERAPY WITH ULTRASOUND GUIDANCE      TOTAL HIP ARTHROPLASTY Right     TOTAL HIP ARTHROPLASTY Left     TRANSFORAMINAL EPIDURAL INJECTION OF STEROID Bilateral 2022    Procedure: INJECTION, STEROID, EPIDURAL, TRANSFORAMINAL APPROACH L3;   Surgeon: Bronwyn Hdz MD;  Location: [facility];  Service: Pain Management;  Laterality: Bilateral;       Social History  Mr. Dozier  reports that he has quit smoking. He has never used smokeless tobacco. He reports current alcohol use. He reports that he does not use drugs.    Family History  Mr.'s Dozier family history is not on file.    Medications and Allergies     Medications  Outpatient Medications Marked as Taking for the 5/26/22 encounter (Office Visit) with Bronwyn Hdz MD   Medication Sig Dispense Refill    ascorbic acid, vitamin C, 250 mg Chew Take by mouth.      atorvastatin (LIPITOR) 10 MG tablet Take 10 mg by mouth once daily.      budesonide-formoterol 160-4.5 mcg (SYMBICORT) 160-4.5 mcg/actuation HFAA Inhale 2 puffs into the lungs every 12 (twelve) hours. Controller 10.2 g 3    fluticasone propionate (FLONASE) 50 mcg/actuation nasal spray SPRAY 2 SPRAYS INTO EACH NOSTRIL TWICE A DAY 48 mL 3    fluticasone-salmeterol diskus inhaler 100-50 mcg Inhale 1 puff into the lungs 2 (two) times daily. Controller      gabapentin (NEURONTIN) 100 MG capsule Take 1 capsule (100 mg total) by mouth once daily. 30 capsule 6    hydrocodone-chlorpheniramine (TUSSIONEX) 10-8 mg/5 mL suspension Take 5 mLs by mouth nightly as needed for Cough. 115 mL 0    ipratropium (ATROVENT) 0.02 % nebulizer solution Take 2.5 mLs (500 mcg total) by nebulization 3 (three) times daily. 60 mL 0    methylPREDNISolone (MEDROL DOSEPACK) 4 mg tablet use as directed 21 each 0    multivitamin capsule Take 1 capsule by mouth once daily.      ondansetron (ZOFRAN-ODT) 4 MG TbDL Take 4 mg by mouth once.      pantoprazole (PROTONIX) 40 MG tablet Take 1 tablet (40 mg total) by mouth once daily. 90 tablet 3    promethazine HCl/codeine (PROMETHAZINE-CODEINE ORAL) Take by mouth.      tiZANidine (ZANAFLEX) 2 MG tablet 1 TAB(S) ORAL EVERY 8 HOURS AS NEEDED FOR MUSCLE SPASM 90 tablet 1    tiZANidine 2 mg Cap Take by mouth.       vitamin D (VITAMIN D3) 1000 units Tab Take 1,000 Units by mouth once daily.         Allergies  Review of patient's allergies indicates:   Allergen Reactions    Opioids - morphine analogues Nausea And Vomiting       Physical Examination     Vitals:    05/26/22 0835   BP: (!) 161/86   Pulse: 80     Spine Musculoskeletal Exam    General        Constitutional: appears stated age, well-developed and well-nourished    Scleral icterus: no    Labored breathing: no    Psychiatric: normal mood and affect and no acute distress    Neurological: alert and oriented x3    Skin: intact    Gait      Gait is normal.    Inspection      Leg length disparity: no discrepancy      Thoracolumbar      Erythema: none    Swelling: none    Edema (right lower extremity): none    Edema (left lower extremity): none    Ecchymosis: none    Deformity: none    Strength      Thoracolumbar      Thoracolumbar motor exam is normal.    Sensory       Thoracolumbar      Thoracolumbar sensation is normal.       Assessment and Plan (including Health Maintenance)      Problem List  Smart Sets  Document Outside HM   :    Plan:   Chronic back pain greater than 3 months duration    Disc displacement, lumbar    Lumbar radiculitis      He has no complaints of pain since bilateral L3 TFESI a few weeks ago. Will f/u 4 months or sooner if needed.    Problem List Items Addressed This Visit        Orthopedic Problems    Disc displacement, lumbar       Other    Chronic back pain greater than 3 months duration - Primary    Lumbar radiculitis            Future Appointments   Date Time Provider Department Center   5/26/2022 10:30 AM Wilton Welch MD [facility]   11/28/2022  9:30 AM Wilton Welch MD [facility]        There are no Patient Instructions on file for this visit.  No follow-ups on file.     Signature:  Bronwyn Hdz MD      Date of encounter: 5/26/22

## 2022-06-01 ENCOUNTER — HOSPITAL ENCOUNTER (OUTPATIENT)
Dept: RADIOLOGY | Facility: HOSPITAL | Age: 72
Discharge: HOME | End: 2022-06-01
Attending: INTERNAL MEDICINE
Payer: MEDICARE

## 2022-06-01 DIAGNOSIS — Z13.6 SCREENING FOR AAA (ABDOMINAL AORTIC ANEURYSM): ICD-10-CM

## 2022-06-01 DIAGNOSIS — J44.9 COPD, MILD: ICD-10-CM

## 2022-06-01 PROCEDURE — 76706 US ABDL AORTA SCREEN AAA: CPT | Mod: TC

## 2022-06-01 RX ORDER — BENZONATATE 200 MG/1
200 CAPSULE ORAL 3 TIMES DAILY PRN
Qty: 30 CAPSULE | Refills: 3 | Status: SHIPPED | OUTPATIENT
Start: 2022-06-01 | End: 2022-06-11

## 2022-06-01 RX ORDER — IPRATROPIUM BROMIDE 0.5 MG/2.5ML
500 SOLUTION RESPIRATORY (INHALATION) 3 TIMES DAILY
Qty: 60 ML | Refills: 3 | Status: CANCELLED | OUTPATIENT
Start: 2022-06-01

## 2022-06-01 RX ORDER — IPRATROPIUM BROMIDE 0.5 MG/2.5ML
500 SOLUTION RESPIRATORY (INHALATION) 3 TIMES DAILY
Qty: 675 ML | Refills: 3 | Status: SHIPPED | OUTPATIENT
Start: 2022-06-01 | End: 2022-06-02

## 2022-06-01 RX ORDER — LORATADINE 10 MG/1
10 TABLET ORAL DAILY
Qty: 90 TABLET | Refills: 3 | Status: SHIPPED | OUTPATIENT
Start: 2022-06-01 | End: 2022-11-28

## 2022-06-01 NOTE — TELEPHONE ENCOUNTER
Spoke with Pharmacy and they did not have refills on on ipratropium   Pharmacy was unable to take verbal because she states she was by herself  Will have to send it rx

## 2022-06-02 ENCOUNTER — OFFICE VISIT (OUTPATIENT)
Dept: FAMILY MEDICINE | Facility: CLINIC | Age: 72
End: 2022-06-02
Payer: MEDICARE

## 2022-06-02 VITALS
RESPIRATION RATE: 17 BRPM | SYSTOLIC BLOOD PRESSURE: 130 MMHG | TEMPERATURE: 99 F | BODY MASS INDEX: 26.85 KG/M2 | OXYGEN SATURATION: 97 % | WEIGHT: 151.63 LBS | HEART RATE: 75 BPM | DIASTOLIC BLOOD PRESSURE: 85 MMHG

## 2022-06-02 DIAGNOSIS — J44.9 MODERATE COPD (CHRONIC OBSTRUCTIVE PULMONARY DISEASE): Primary | ICD-10-CM

## 2022-06-02 PROCEDURE — 99214 PR OFFICE/OUTPT VISIT, EST, LEVL IV, 30-39 MIN: ICD-10-PCS | Mod: ,,, | Performed by: INTERNAL MEDICINE

## 2022-06-02 PROCEDURE — 99214 OFFICE O/P EST MOD 30 MIN: CPT | Mod: ,,, | Performed by: INTERNAL MEDICINE

## 2022-06-02 RX ORDER — METHYLPREDNISOLONE 4 MG/1
TABLET ORAL
Qty: 21 EACH | Refills: 0 | Status: SHIPPED | OUTPATIENT
Start: 2022-06-02 | End: 2022-06-23

## 2022-06-02 RX ORDER — IPRATROPIUM BROMIDE AND ALBUTEROL SULFATE 2.5; .5 MG/3ML; MG/3ML
3 SOLUTION RESPIRATORY (INHALATION) EVERY 6 HOURS PRN
Qty: 75 ML | Refills: 6 | Status: SHIPPED | OUTPATIENT
Start: 2022-06-02 | End: 2022-06-14 | Stop reason: SDUPTHER

## 2022-06-02 NOTE — PROGRESS NOTES
Subjective:      Patient ID: Reji Dozier is a 71 y.o. male.    Chief Complaint: Follow-up (1 Week Follow-Up )    Disclaimer:  This note is prepared using voice recognition software and as such is likely to have errors and has not been proof read. Please contact me for questions.     HPI     Patient is a 71 year old WM here today for a 1 week follow up for cough and wheezing. Patient had URI causing COPD exacerbation that lasted almost 4 weeks. Today says he is feeling much better- rarely coughing now, completed antibiotics and steroids a week ago, still using nebulizer solution and cough suppressant, energy returned but has lost his taste and smell. He has no diarrhea/vomiting reported.    Lab Results   Component Value Date    HGBA1C 5.5 02/22/2022    HGBA1C 6.1 02/10/2020      Lab Results   Component Value Date    CHOL 173 02/22/2022    CHOL 171 05/13/2021     No results found for: LDLCALC    Wt Readings from Last 10 Encounters:   06/02/22 68.8 kg (151 lb 9.6 oz)   05/26/22 69.9 kg (154 lb)   05/26/22 68 kg (150 lb)   05/04/22 68.4 kg (150 lb 12.7 oz)   11/23/21 68.6 kg (151 lb 3.1 oz)       The 10-year ASCVD risk score (Tayatayler SAM Jr., et al., 2013) is: 17.4%*    Values used to calculate the score:      Age: 71 years      Sex: Male      Is Non- : No      Diabetic: No      Tobacco smoker: No      Systolic Blood Pressure: 130 mmHg      Is BP treated: No      HDL Cholesterol: 49 mg/dL*      Total Cholesterol: 173 mg/dL*      * - Cholesterol units were assumed for this score calculation        Lab Results   Component Value Date    WBC 7.6 02/22/2022    HGB 11.5 02/22/2022    HCT 34.6 02/22/2022     02/22/2022    CHOL 173 02/22/2022    TRIG 129 02/22/2022    HDL 49 02/22/2022    ALT 22 02/22/2022    AST 24 02/22/2022     02/22/2022    K 4.5 02/22/2022    CREATININE 0.88 02/22/2022    BUN 10.6 02/22/2022    CO2 28 02/22/2022    PSA 4.05 02/22/2022    INR 1.0 02/15/2022    HGBA1C  5.5 02/22/2022       US AAA Screening  Narrative: EXAMINATION:  US AAA SCREENING    CLINICAL HISTORY:  Encounter for screening for cardiovascular disorders    TECHNIQUE:  Limited ultrasound was performed of the abdominal aorta, with cross sectional diameter measurements obtained.    COMPARISON:  None.    FINDINGS:  The proximal abdominal aorta measures 2 x 2.4 cm.    The mid abdominal aorta measures 2.1 x 1.9 cm.    The distal abdominal aorta measures 0.6 x 2 cm.    The right iliac artery measures 1.1 x 1 cm.    The left iliac artery measures 1.2 x 9 mm cm.    There is a portion of the distal aorta measuring 2.5 x 2.6 cm extending for length of 2.6 cm    No evidence of aneurysmal dilatation of the abdominal aorta  Impression: Slight ectasia of the distal aorta with no evidence of aneurysmal dilatation    Electronically signed by: Shay Lora  Date:    06/01/2022  Time:    13:17        Review of Systems   Constitutional: Negative for chills and fever.   Respiratory: Positive for cough. Negative for shortness of breath and wheezing.      Objective:     Vitals:    06/02/22 1118   BP: 130/85   BP Location: Left arm   Patient Position: Sitting   BP Method: Large (Automatic)   Pulse: 75   Resp: 17   Temp: 98.6 °F (37 °C)   TempSrc: Oral   SpO2: 97%   Weight: 68.8 kg (151 lb 9.6 oz)     Physical Exam  Vitals and nursing note reviewed.   Constitutional:       Appearance: Normal appearance.   HENT:      Head: Normocephalic and atraumatic.   Pulmonary:      Effort: Pulmonary effort is normal. No respiratory distress.      Breath sounds: Normal breath sounds. No wheezing.   Neurological:      Mental Status: He is alert.   Psychiatric:         Mood and Affect: Mood normal.         Behavior: Behavior normal.       Assessment:     1. Moderate COPD (chronic obstructive pulmonary disease)      Plan:   Reji was seen today for follow-up.    Diagnoses and all orders for this visit:    Moderate COPD (chronic obstructive pulmonary  disease)    Other orders  -     methylPREDNISolone (MEDROL DOSEPACK) 4 mg tablet; use as directed    Patient still with some expiratory wheezing on examination  Recommend medrol dose pack now  Continue nebulizer treatments every 4-6 hours x 1 more week, then taper off to as needed  RTC PRN        No follow-ups on file.    There are no Patient Instructions on file for this visit.

## 2022-06-14 DIAGNOSIS — J44.9 CHRONIC OBSTRUCTIVE PULMONARY DISEASE, UNSPECIFIED COPD TYPE: Primary | ICD-10-CM

## 2022-06-14 RX ORDER — IPRATROPIUM BROMIDE AND ALBUTEROL SULFATE 2.5; .5 MG/3ML; MG/3ML
3 SOLUTION RESPIRATORY (INHALATION) EVERY 6 HOURS PRN
Qty: 75 ML | Refills: 6 | Status: SHIPPED | OUTPATIENT
Start: 2022-06-14 | End: 2023-01-23 | Stop reason: SDUPTHER

## 2022-06-14 NOTE — TELEPHONE ENCOUNTER
I have signed for the following orders AND/OR meds. Please notify the patient and ask the patient to schedule the testing and/or information about any medications that were sent.      Medications Ordered This Encounter   Medications    albuterol-ipratropium (DUO-NEB) 2.5 mg-0.5 mg/3 mL nebulizer solution     Sig: Take 3 mLs by nebulization every 6 (six) hours as needed for Wheezing. Rescue     Dispense:  75 mL     Refill:  6     No orders of the defined types were placed in this encounter.

## 2022-06-24 ENCOUNTER — TELEPHONE (OUTPATIENT)
Dept: FAMILY MEDICINE | Facility: CLINIC | Age: 72
End: 2022-06-24

## 2022-06-24 ENCOUNTER — OFFICE VISIT (OUTPATIENT)
Dept: FAMILY MEDICINE | Facility: CLINIC | Age: 72
End: 2022-06-24
Payer: MEDICARE

## 2022-06-24 ENCOUNTER — LAB VISIT (OUTPATIENT)
Dept: LAB | Facility: HOSPITAL | Age: 72
End: 2022-06-24
Attending: NURSE PRACTITIONER
Payer: MEDICARE

## 2022-06-24 VITALS — WEIGHT: 150 LBS | HEIGHT: 63 IN | BODY MASS INDEX: 26.58 KG/M2

## 2022-06-24 DIAGNOSIS — J02.9 PHARYNGITIS, UNSPECIFIED ETIOLOGY: ICD-10-CM

## 2022-06-24 DIAGNOSIS — J06.9 ACUTE UPPER RESPIRATORY INFECTION, UNSPECIFIED: ICD-10-CM

## 2022-06-24 DIAGNOSIS — Z12.5 ENCOUNTER FOR SCREENING FOR MALIGNANT NEOPLASM OF PROSTATE: ICD-10-CM

## 2022-06-24 DIAGNOSIS — E78.5 HYPERLIPIDEMIA, UNSPECIFIED HYPERLIPIDEMIA TYPE: ICD-10-CM

## 2022-06-24 DIAGNOSIS — J44.9 MODERATE COPD (CHRONIC OBSTRUCTIVE PULMONARY DISEASE): ICD-10-CM

## 2022-06-24 DIAGNOSIS — R35.1 NOCTURIA: ICD-10-CM

## 2022-06-24 DIAGNOSIS — Z00.00 WELLNESS EXAMINATION: ICD-10-CM

## 2022-06-24 DIAGNOSIS — J02.9 PHARYNGITIS, UNSPECIFIED ETIOLOGY: Primary | ICD-10-CM

## 2022-06-24 DIAGNOSIS — Z11.59 ENCOUNTER FOR HEPATITIS C SCREENING TEST FOR LOW RISK PATIENT: ICD-10-CM

## 2022-06-24 LAB
ALBUMIN SERPL-MCNC: 3.5 GM/DL (ref 3.4–4.8)
ALBUMIN/GLOB SERPL: 1 RATIO (ref 1.1–2)
ALP SERPL-CCNC: 103 UNIT/L (ref 40–150)
ALT SERPL-CCNC: 18 UNIT/L (ref 0–55)
APPEARANCE UR: CLEAR
AST SERPL-CCNC: 20 UNIT/L (ref 5–34)
BASOPHILS # BLD AUTO: 0.02 X10(3)/MCL (ref 0–0.2)
BASOPHILS NFR BLD AUTO: 0.2 %
BILIRUB UR QL STRIP.AUTO: NEGATIVE MG/DL
BILIRUBIN DIRECT+TOT PNL SERPL-MCNC: 0.3 MG/DL
BUN SERPL-MCNC: 7.8 MG/DL (ref 8.4–25.7)
CALCIUM SERPL-MCNC: 9.9 MG/DL (ref 8.8–10)
CHLORIDE SERPL-SCNC: 108 MMOL/L (ref 98–107)
CHOLEST SERPL-MCNC: 185 MG/DL
CHOLEST/HDLC SERPL: 4 {RATIO} (ref 0–5)
CO2 SERPL-SCNC: 28 MMOL/L (ref 23–31)
COLOR UR AUTO: YELLOW
CREAT SERPL-MCNC: 0.86 MG/DL (ref 0.73–1.18)
EOSINOPHIL # BLD AUTO: 0.28 X10(3)/MCL (ref 0–0.9)
EOSINOPHIL NFR BLD AUTO: 2.9 %
ERYTHROCYTE [DISTWIDTH] IN BLOOD BY AUTOMATED COUNT: 15.4 % (ref 11.5–17)
GLOBULIN SER-MCNC: 3.5 GM/DL (ref 2.4–3.5)
GLUCOSE SERPL-MCNC: 110 MG/DL (ref 82–115)
GLUCOSE UR QL STRIP.AUTO: NEGATIVE MG/DL
HCT VFR BLD AUTO: 44.2 % (ref 42–52)
HCV AB SERPL QL IA: NONREACTIVE
HDLC SERPL-MCNC: 50 MG/DL (ref 35–60)
HGB BLD-MCNC: 14.7 GM/DL (ref 14–18)
IMM GRANULOCYTES # BLD AUTO: 0.02 X10(3)/MCL (ref 0–0.02)
IMM GRANULOCYTES NFR BLD AUTO: 0.2 % (ref 0–0.43)
KETONES UR QL STRIP.AUTO: NEGATIVE MG/DL
LDLC SERPL CALC-MCNC: 103 MG/DL (ref 50–140)
LEUKOCYTE ESTERASE UR QL STRIP.AUTO: NEGATIVE UNIT/L
LYMPHOCYTES # BLD AUTO: 1.65 X10(3)/MCL (ref 0.6–4.6)
LYMPHOCYTES NFR BLD AUTO: 17 %
MCH RBC QN AUTO: 28.8 PG (ref 27–31)
MCHC RBC AUTO-ENTMCNC: 33.3 MG/DL (ref 33–36)
MCV RBC AUTO: 86.7 FL (ref 80–94)
MONOCYTES # BLD AUTO: 0.87 X10(3)/MCL (ref 0.1–1.3)
MONOCYTES NFR BLD AUTO: 9 %
NEUTROPHILS # BLD AUTO: 6.9 X10(3)/MCL (ref 2.1–9.2)
NEUTROPHILS NFR BLD AUTO: 70.7 %
NITRITE UR QL STRIP.AUTO: NEGATIVE
NRBC BLD AUTO-RTO: 0 %
PH UR STRIP.AUTO: 5 [PH]
PLATELET # BLD AUTO: 212 X10(3)/MCL (ref 130–400)
PMV BLD AUTO: 10.5 FL (ref 9.4–12.4)
POTASSIUM SERPL-SCNC: 4.9 MMOL/L (ref 3.5–5.1)
PROT SERPL-MCNC: 7 GM/DL (ref 5.8–7.6)
PROT UR QL STRIP.AUTO: NEGATIVE MG/DL
PSA SERPL-MCNC: 4.46 NG/ML
RBC # BLD AUTO: 5.1 X10(6)/MCL (ref 4.7–6.1)
RBC UR QL AUTO: NEGATIVE UNIT/L
SODIUM SERPL-SCNC: 145 MMOL/L (ref 136–145)
SP GR UR STRIP.AUTO: 1.02
STREP A PCR (OHS): NOT DETECTED
TRIGL SERPL-MCNC: 162 MG/DL (ref 34–140)
UROBILINOGEN UR STRIP-ACNC: 0.2 MG/DL
VLDLC SERPL CALC-MCNC: 32 MG/DL
WBC # SPEC AUTO: 9.7 X10(3)/MCL (ref 4.5–11.5)

## 2022-06-24 PROCEDURE — 85025 COMPLETE CBC W/AUTO DIFF WBC: CPT

## 2022-06-24 PROCEDURE — 80061 LIPID PANEL: CPT

## 2022-06-24 PROCEDURE — 99213 OFFICE O/P EST LOW 20 MIN: CPT | Mod: 95,,, | Performed by: NURSE PRACTITIONER

## 2022-06-24 PROCEDURE — 84153 ASSAY OF PSA TOTAL: CPT

## 2022-06-24 PROCEDURE — 80053 COMPREHEN METABOLIC PANEL: CPT

## 2022-06-24 PROCEDURE — 99213 PR OFFICE/OUTPT VISIT, EST, LEVL III, 20-29 MIN: ICD-10-PCS | Mod: 95,,, | Performed by: NURSE PRACTITIONER

## 2022-06-24 PROCEDURE — 36415 COLL VENOUS BLD VENIPUNCTURE: CPT

## 2022-06-24 PROCEDURE — 86803 HEPATITIS C AB TEST: CPT

## 2022-06-24 PROCEDURE — 81003 URINALYSIS AUTO W/O SCOPE: CPT

## 2022-06-24 PROCEDURE — 87631 RESP VIRUS 3-5 TARGETS: CPT

## 2022-06-24 NOTE — ASSESSMENT & PLAN NOTE
Instructed to gargle with warm salt water prn  OTC throat lozenges prn  OTC Tylenol prn  Will obtain  Strep A PCR today; Will call with results  Report to ED for any CP, SOB, and /or worsening symptoms  F/U if no improvement  Patient is agreeable to plan and verbalizes understanding

## 2022-06-24 NOTE — PROGRESS NOTES
Subjective:      Patient ID: Reji Dozier is a 71 y.o. White male      Chief Complaint: Follow-up (Sore throat)       Past Medical History:   Diagnosis Date    Arthritis     hip    Asthma     Chronic back pain     COPD (chronic obstructive pulmonary disease)     Dyslipidemia     Elevated PSA measurement     Lower extremity weakness     Lumbar radiculitis     Lumbar spinal stenosis     Mixed hyperlipidemia     Nocturia     Radicular low back pain     Skin lesion     Umbilical hernia       This is a telemedicine note. Patient was treated using telemedicine, real-time audio and video. I, distant provider, conducted the visit from location identified below. The patient participated in the visit at a non- [facility] location selected by the patient identified below. I am licensed in the state where the patient stated they are located. The patient stated that they understood and accepted the privacy and security risks to their information at their location.  Patient was located at home.  I, distant provider, was located at [facility]      HPI  Sore Throat   This is a new problem. The current episode started yesterday. The problem has been gradually worsening. There has been no fever. The pain is at a severity of 8/10. Pertinent negatives include no congestion, coughing, ear discharge, ear pain, headaches, shortness of breath or vomiting. He has tried nothing for the symptoms.   Of note, states probable/presumed Covid-19 infection 1-2 months ago.  Although he tested negative, states symptoms included cough and loss of taste/smell.  Those symptoms have since then resolved.  Denies any other problems.    Review of Systems   HENT: Positive for sore throat. Negative for nasal congestion, ear discharge and ear pain.    Respiratory: Negative for cough and shortness of breath.    Gastrointestinal: Negative for vomiting.   Neurological: Negative for headaches.          Objective:      There were no  vitals filed for this visit.   Body mass index is 26.57 kg/m².     Physical Exam       Current Outpatient Medications:     albuterol (PROVENTIL/VENTOLIN HFA) 90 mcg/actuation inhaler, Inhale 6.7 g into the lungs as needed., Disp: , Rfl:     albuterol-ipratropium (DUO-NEB) 2.5 mg-0.5 mg/3 mL nebulizer solution, Take 3 mLs by nebulization every 6 (six) hours as needed for Wheezing. Rescue, Disp: 75 mL, Rfl: 6    ascorbic acid, vitamin C, 250 mg Chew, Take by mouth., Disp: , Rfl:     atorvastatin (LIPITOR) 10 MG tablet, Take 10 mg by mouth once daily., Disp: , Rfl:     budesonide-formoterol 160-4.5 mcg (SYMBICORT) 160-4.5 mcg/actuation HFAA, Inhale 2 puffs into the lungs every 12 (twelve) hours. Controller, Disp: 10.2 g, Rfl: 3    fluticasone propionate (FLONASE) 50 mcg/actuation nasal spray, SPRAY 2 SPRAYS INTO EACH NOSTRIL TWICE A DAY, Disp: 48 mL, Rfl: 3    fluticasone-salmeterol diskus inhaler 100-50 mcg, Inhale 1 puff into the lungs 2 (two) times daily. Controller, Disp: , Rfl:     gabapentin (NEURONTIN) 100 MG capsule, Take 1 capsule (100 mg total) by mouth once daily., Disp: 30 capsule, Rfl: 6    loratadine (CLARITIN) 10 mg tablet, Take 1 tablet (10 mg total) by mouth once daily., Disp: 90 tablet, Rfl: 3    multivitamin capsule, Take 1 capsule by mouth once daily., Disp: , Rfl:     ondansetron (ZOFRAN) 4 MG tablet, Take 4 mg by mouth as needed., Disp: , Rfl:     tiZANidine (ZANAFLEX) 2 MG tablet, TAKE 1 TABLET BY MOUTH EVERY 8 HOURS AS NEEDED FOR MUSCLE SPASM, Disp: 90 tablet, Rfl: 1    vitamin D (VITAMIN D3) 1000 units Tab, Take 1,000 Units by mouth once daily., Disp: , Rfl:     Assessment & Plan:     Problem List Items Addressed This Visit        ENT    Pharyngitis - Primary     Instructed to gargle with warm salt water prn  OTC throat lozenges prn  OTC Tylenol prn  Will obtain  Strep A PCR today; Will call with results  Report to ED for any CP, SOB, and /or worsening symptoms  F/U if no  improvement  Patient is agreeable to plan and verbalizes understanding             Relevant Orders    Strep Group A by PCR    Acute upper respiratory infection, unspecified      Instructed to gargle with warm salt water prn  OTC throat lozenges prn  OTC Tylenol prn  Will obtain  Strep A PCR today; Will call with results  Report to ED for any CP, SOB, and /or worsening symptoms  F/U if no improvement  Patient is agreeable to plan and verbalizes understanding               Relevant Orders    Strep Group A by PCR

## 2022-06-27 ENCOUNTER — TELEPHONE (OUTPATIENT)
Dept: FAMILY MEDICINE | Facility: CLINIC | Age: 72
End: 2022-06-27
Payer: MEDICARE

## 2022-06-27 DIAGNOSIS — R97.20 ELEVATED PSA: Primary | ICD-10-CM

## 2022-06-27 NOTE — TELEPHONE ENCOUNTER
I have signed for the following orders AND/OR meds. Please notify the patient and ask the patient to schedule the testing and/or information about any medications that were sent.         Orders Placed This Encounter   Procedures    Ambulatory referral/consult to Urology     Standing Status:   Future     Standing Expiration Date:   7/27/2023     Referral Priority:   Routine     Referral Type:   Consultation     Referral Reason:   Specialty Services Required     Referred to Provider:   Oleksandr Harris MD     Requested Specialty:   Urology     Number of Visits Requested:   1

## 2022-07-07 ENCOUNTER — TELEPHONE (OUTPATIENT)
Dept: FAMILY MEDICINE | Facility: CLINIC | Age: 72
End: 2022-07-07
Payer: MEDICARE

## 2022-07-07 RX ORDER — METHYLPREDNISOLONE 4 MG/1
TABLET ORAL
Qty: 21 EACH | Refills: 0 | Status: SHIPPED | OUTPATIENT
Start: 2022-07-07 | End: 2022-07-28

## 2022-07-07 RX ORDER — AZITHROMYCIN 250 MG/1
TABLET, FILM COATED ORAL
Qty: 6 TABLET | Refills: 0 | Status: SHIPPED | OUTPATIENT
Start: 2022-07-07 | End: 2022-07-12

## 2022-07-07 NOTE — TELEPHONE ENCOUNTER
Patient called today c/o congestion, cough productive of white green mucous (turned to light yellow), no taste or smell (comes and goes), fever last Friday and Saturday (went away Sunday evening), denies trouble breathing (besides congestion). He is doing breathing treaments and inhaler. He has been having this since May. Per Dr. Welch verbal order she will send him antibiotics and steroids to the pharmacy, if he doesn't feel better in a few days or breathing becomes labored, he is to report to the ER. Called patient twice and no answer. Left message for patient to return my call. Echo

## 2022-07-14 ENCOUNTER — TELEPHONE (OUTPATIENT)
Dept: FAMILY MEDICINE | Facility: CLINIC | Age: 72
End: 2022-07-14
Payer: MEDICARE

## 2022-08-26 ENCOUNTER — OFFICE VISIT (OUTPATIENT)
Dept: ORTHOPEDICS | Facility: CLINIC | Age: 72
End: 2022-08-26
Payer: MEDICARE

## 2022-08-26 VITALS
HEIGHT: 63 IN | DIASTOLIC BLOOD PRESSURE: 80 MMHG | HEART RATE: 66 BPM | BODY MASS INDEX: 26.58 KG/M2 | WEIGHT: 150 LBS | SYSTOLIC BLOOD PRESSURE: 145 MMHG

## 2022-08-26 DIAGNOSIS — M54.9 CHRONIC BACK PAIN GREATER THAN 3 MONTHS DURATION: Primary | ICD-10-CM

## 2022-08-26 DIAGNOSIS — G89.29 CHRONIC BACK PAIN GREATER THAN 3 MONTHS DURATION: Primary | ICD-10-CM

## 2022-08-26 DIAGNOSIS — M51.26 DISC DISPLACEMENT, LUMBAR: ICD-10-CM

## 2022-08-26 DIAGNOSIS — M54.16 LUMBAR RADICULITIS: ICD-10-CM

## 2022-08-26 PROCEDURE — 99213 PR OFFICE/OUTPT VISIT, EST, LEVL III, 20-29 MIN: ICD-10-PCS | Mod: ,,, | Performed by: ANESTHESIOLOGY

## 2022-08-26 PROCEDURE — 99213 OFFICE O/P EST LOW 20 MIN: CPT | Mod: ,,, | Performed by: ANESTHESIOLOGY

## 2022-08-26 NOTE — H&P (VIEW-ONLY)
Bronwyn Hdz MD        PATIENT NAME: Reji Dozier  : 1950  DATE: 22  MRN: [id number]      Billing Provider: Bronwyn Hdz MD  Level of Service:   Patient PCP Information     Provider PCP Type    Wilton Welch MD General          Reason for Visit / Chief Complaint: Back Pain (C/O constant pain in lower right side of back at this time. Pain scale 6/10 right now. States pain is worse when he is on his feet. States sometimes his right leg goes numb.)       Update PCP  Update Chief Complaint         History of Present Illness / Problem Focused Workflow     Reji Dozier presents to the clinic with Back Pain (C/O constant pain in lower right side of back at this time. Pain scale 6/10 right now. States pain is worse when he is on his feet. States sometimes his right leg goes numb.)     LBP radiating down BLE has gotten worse after bilateral L3 TFESI in May this year.  He was ill with a virus for over a month. Then found to have elevated PSA and underwent prostate biopsy, but it reportedly came back normal.  He is now ready to have another injection for his back pain.     Follow-up    Back Pain        Review of Systems     Review of Systems   Musculoskeletal: Positive for back pain.   All other systems reviewed and are negative.       Medical / Social / Family History     Past Medical History:   Diagnosis Date    Arthritis     hip    Asthma     Chronic back pain     COPD (chronic obstructive pulmonary disease)     Dyslipidemia     Elevated PSA measurement     Lower extremity weakness     Lumbar radiculitis     Lumbar spinal stenosis     Mixed hyperlipidemia     Nocturia     Radicular low back pain     Skin lesion     Umbilical hernia        Past Surgical History:   Procedure Laterality Date    COLONOSCOPY      control bleeding      of gastrointestinal tract    ESOPHAGOGASTRODUODENOSCOPY      HEMORRHOID SURGERY      HERNIA REPAIR      INJECTION OF FACET JOINT Bilateral      SCLEROTHERAPY WITH ULTRASOUND GUIDANCE      TOTAL HIP ARTHROPLASTY Right     TOTAL HIP ARTHROPLASTY Left     TRANSFORAMINAL EPIDURAL INJECTION OF STEROID Bilateral 5/4/2022    Procedure: INJECTION, STEROID, EPIDURAL, TRANSFORAMINAL APPROACH L3;  Surgeon: Bronwyn Hdz MD;  Location: [facility];  Service: Pain Management;  Laterality: Bilateral;       Social History  Mr. Dozier  reports that he has quit smoking. His smoking use included cigarettes. He has never used smokeless tobacco. He reports current alcohol use. He reports that he does not use drugs.    Family History  's Jacoby Family history is unknown by patient.    Medications and Allergies     Medications  Outpatient Medications Marked as Taking for the 8/26/22 encounter (Office Visit) with Bronwyn Hdz MD   Medication Sig Dispense Refill    albuterol (PROVENTIL/VENTOLIN HFA) 90 mcg/actuation inhaler Inhale 6.7 g into the lungs as needed.      albuterol-ipratropium (DUO-NEB) 2.5 mg-0.5 mg/3 mL nebulizer solution Take 3 mLs by nebulization every 6 (six) hours as needed for Wheezing. Rescue 75 mL 6    ascorbic acid, vitamin C, 250 mg Chew Take by mouth.      atorvastatin (LIPITOR) 10 MG tablet Take 10 mg by mouth once daily.      budesonide-formoterol 160-4.5 mcg (SYMBICORT) 160-4.5 mcg/actuation HFAA Inhale 2 puffs into the lungs every 12 (twelve) hours. Controller 10.2 g 3    fluticasone propionate (FLONASE) 50 mcg/actuation nasal spray SPRAY 2 SPRAYS INTO EACH NOSTRIL TWICE A DAY 48 mL 3    fluticasone-salmeterol diskus inhaler 100-50 mcg Inhale 1 puff into the lungs 2 (two) times daily. Controller      gabapentin (NEURONTIN) 100 MG capsule Take 1 capsule (100 mg total) by mouth once daily. 30 capsule 6    loratadine (CLARITIN) 10 mg tablet Take 1 tablet (10 mg total) by mouth once daily. 90 tablet 3    multivitamin capsule Take 1 capsule by mouth once daily.      ondansetron (ZOFRAN) 4 MG tablet Take 4 mg by mouth as needed.       tiZANidine (ZANAFLEX) 2 MG tablet TAKE 1 TABLET BY MOUTH EVERY 8 HOURS AS NEEDED FOR MUSCLE SPASM 90 tablet 1    vitamin D (VITAMIN D3) 1000 units Tab Take 1,000 Units by mouth once daily.         Allergies  Review of patient's allergies indicates:   Allergen Reactions    Opioids - morphine analogues Nausea And Vomiting       Physical Examination     Vitals:    08/26/22 1052   BP: (!) 145/80   Pulse: 66     Spine Musculoskeletal Exam    General        Constitutional: appears stated age, well-developed and well-nourished    Scleral icterus: no    Labored breathing: no    Psychiatric: normal mood and affect and no acute distress    Neurological: alert and oriented x3    Skin: intact    Gait      Gait is normal.    Inspection      Leg length disparity: no discrepancy      Thoracolumbar      Erythema: none    Swelling: none    Edema (right lower extremity): none    Edema (left lower extremity): none    Ecchymosis: none    Deformity: none    Strength      Thoracolumbar      Thoracolumbar motor exam is normal.    Sensory       Thoracolumbar      Thoracolumbar sensation is normal.       Assessment and Plan (including Health Maintenance)      Problem List  Smart Sets  Document Outside HM   :    Plan:   Chronic back pain greater than 3 months duration    Lumbar radiculitis    Disc displacement, lumbar      Will repeat bilateral L3 TFESI. Previously had good relief from these injections. Plan discussed with patient and he wishes to proceed.    Problem List Items Addressed This Visit        Orthopedic Problems    Disc displacement, lumbar       Other    Chronic back pain greater than 3 months duration - Primary    Lumbar radiculitis            Future Appointments   Date Time Provider Department Center   9/29/2022 10:30 AM Bronwyn Hdz MD [facility]   11/28/2022  9:30 AM Wilton Welch MD [facility]        There are no Patient Instructions on file for this visit.  No follow-ups on file.      Signature:  Bronwyn Hdz MD      Date of encounter: 8/26/22

## 2022-08-26 NOTE — PROGRESS NOTES
Bronwyn Hdz MD        PATIENT NAME: Reji Dozier  : 1950  DATE: 22  MRN: [id number]      Billing Provider: Bronwyn Hdz MD  Level of Service:   Patient PCP Information     Provider PCP Type    Wilton Welch MD General          Reason for Visit / Chief Complaint: Back Pain (C/O constant pain in lower right side of back at this time. Pain scale 6/10 right now. States pain is worse when he is on his feet. States sometimes his right leg goes numb.)       Update PCP  Update Chief Complaint         History of Present Illness / Problem Focused Workflow     Reji Dozier presents to the clinic with Back Pain (C/O constant pain in lower right side of back at this time. Pain scale 6/10 right now. States pain is worse when he is on his feet. States sometimes his right leg goes numb.)     LBP radiating down BLE has gotten worse after bilateral L3 TFESI in May this year.  He was ill with a virus for over a month. Then found to have elevated PSA and underwent prostate biopsy, but it reportedly came back normal.  He is now ready to have another injection for his back pain.     Follow-up    Back Pain        Review of Systems     Review of Systems   Musculoskeletal: Positive for back pain.   All other systems reviewed and are negative.       Medical / Social / Family History     Past Medical History:   Diagnosis Date    Arthritis     hip    Asthma     Chronic back pain     COPD (chronic obstructive pulmonary disease)     Dyslipidemia     Elevated PSA measurement     Lower extremity weakness     Lumbar radiculitis     Lumbar spinal stenosis     Mixed hyperlipidemia     Nocturia     Radicular low back pain     Skin lesion     Umbilical hernia        Past Surgical History:   Procedure Laterality Date    COLONOSCOPY      control bleeding      of gastrointestinal tract    ESOPHAGOGASTRODUODENOSCOPY      HEMORRHOID SURGERY      HERNIA REPAIR      INJECTION OF FACET JOINT Bilateral      SCLEROTHERAPY WITH ULTRASOUND GUIDANCE      TOTAL HIP ARTHROPLASTY Right     TOTAL HIP ARTHROPLASTY Left     TRANSFORAMINAL EPIDURAL INJECTION OF STEROID Bilateral 5/4/2022    Procedure: INJECTION, STEROID, EPIDURAL, TRANSFORAMINAL APPROACH L3;  Surgeon: Bronwyn Hdz MD;  Location: [facility];  Service: Pain Management;  Laterality: Bilateral;       Social History  Mr. Dozier  reports that he has quit smoking. His smoking use included cigarettes. He has never used smokeless tobacco. He reports current alcohol use. He reports that he does not use drugs.    Family History  's Jacoby Family history is unknown by patient.    Medications and Allergies     Medications  Outpatient Medications Marked as Taking for the 8/26/22 encounter (Office Visit) with Bronwyn Hdz MD   Medication Sig Dispense Refill    albuterol (PROVENTIL/VENTOLIN HFA) 90 mcg/actuation inhaler Inhale 6.7 g into the lungs as needed.      albuterol-ipratropium (DUO-NEB) 2.5 mg-0.5 mg/3 mL nebulizer solution Take 3 mLs by nebulization every 6 (six) hours as needed for Wheezing. Rescue 75 mL 6    ascorbic acid, vitamin C, 250 mg Chew Take by mouth.      atorvastatin (LIPITOR) 10 MG tablet Take 10 mg by mouth once daily.      budesonide-formoterol 160-4.5 mcg (SYMBICORT) 160-4.5 mcg/actuation HFAA Inhale 2 puffs into the lungs every 12 (twelve) hours. Controller 10.2 g 3    fluticasone propionate (FLONASE) 50 mcg/actuation nasal spray SPRAY 2 SPRAYS INTO EACH NOSTRIL TWICE A DAY 48 mL 3    fluticasone-salmeterol diskus inhaler 100-50 mcg Inhale 1 puff into the lungs 2 (two) times daily. Controller      gabapentin (NEURONTIN) 100 MG capsule Take 1 capsule (100 mg total) by mouth once daily. 30 capsule 6    loratadine (CLARITIN) 10 mg tablet Take 1 tablet (10 mg total) by mouth once daily. 90 tablet 3    multivitamin capsule Take 1 capsule by mouth once daily.      ondansetron (ZOFRAN) 4 MG tablet Take 4 mg by mouth as needed.       tiZANidine (ZANAFLEX) 2 MG tablet TAKE 1 TABLET BY MOUTH EVERY 8 HOURS AS NEEDED FOR MUSCLE SPASM 90 tablet 1    vitamin D (VITAMIN D3) 1000 units Tab Take 1,000 Units by mouth once daily.         Allergies  Review of patient's allergies indicates:   Allergen Reactions    Opioids - morphine analogues Nausea And Vomiting       Physical Examination     Vitals:    08/26/22 1052   BP: (!) 145/80   Pulse: 66     Spine Musculoskeletal Exam    General        Constitutional: appears stated age, well-developed and well-nourished    Scleral icterus: no    Labored breathing: no    Psychiatric: normal mood and affect and no acute distress    Neurological: alert and oriented x3    Skin: intact    Gait      Gait is normal.    Inspection      Leg length disparity: no discrepancy      Thoracolumbar      Erythema: none    Swelling: none    Edema (right lower extremity): none    Edema (left lower extremity): none    Ecchymosis: none    Deformity: none    Strength      Thoracolumbar      Thoracolumbar motor exam is normal.    Sensory       Thoracolumbar      Thoracolumbar sensation is normal.       Assessment and Plan (including Health Maintenance)      Problem List  Smart Sets  Document Outside HM   :    Plan:   Chronic back pain greater than 3 months duration    Lumbar radiculitis    Disc displacement, lumbar      Will repeat bilateral L3 TFESI. Previously had good relief from these injections. Plan discussed with patient and he wishes to proceed.    Problem List Items Addressed This Visit        Orthopedic Problems    Disc displacement, lumbar       Other    Chronic back pain greater than 3 months duration - Primary    Lumbar radiculitis            Future Appointments   Date Time Provider Department Center   9/29/2022 10:30 AM Bronwyn Hdz MD [facility]   11/28/2022  9:30 AM Wilton Welch MD [facility]        There are no Patient Instructions on file for this visit.  No follow-ups on file.      Signature:  Bronwyn Hdz MD      Date of encounter: 8/26/22

## 2022-08-29 DIAGNOSIS — M51.26 DISC DISPLACEMENT, LUMBAR: ICD-10-CM

## 2022-08-29 DIAGNOSIS — M54.16 LUMBAR RADICULITIS: Primary | ICD-10-CM

## 2022-09-09 ENCOUNTER — ANESTHESIA EVENT (OUTPATIENT)
Dept: SURGERY | Facility: HOSPITAL | Age: 72
End: 2022-09-09
Payer: MEDICARE

## 2022-09-14 ENCOUNTER — ANESTHESIA (OUTPATIENT)
Dept: SURGERY | Facility: HOSPITAL | Age: 72
End: 2022-09-14
Payer: MEDICARE

## 2022-09-14 ENCOUNTER — HOSPITAL ENCOUNTER (OUTPATIENT)
Facility: HOSPITAL | Age: 72
Discharge: HOME | End: 2022-09-14
Attending: ANESTHESIOLOGY | Admitting: ANESTHESIOLOGY
Payer: MEDICARE

## 2022-09-14 DIAGNOSIS — M54.9 CHRONIC BACK PAIN GREATER THAN 3 MONTHS DURATION: Primary | ICD-10-CM

## 2022-09-14 DIAGNOSIS — G89.29 CHRONIC BACK PAIN GREATER THAN 3 MONTHS DURATION: Primary | ICD-10-CM

## 2022-09-14 PROCEDURE — 64483 NJX AA&/STRD TFRM EPI L/S 1: CPT | Mod: 50,,, | Performed by: ANESTHESIOLOGY

## 2022-09-14 PROCEDURE — 37000008 HC ANESTHESIA 1ST 15 MINUTES: Performed by: ANESTHESIOLOGY

## 2022-09-14 PROCEDURE — 64483 PR EPIDURAL INJ, ANES/STEROID, TRANSFORAMINAL, LUMB/SACR, SNGL LEVL: ICD-10-PCS | Mod: 50,,, | Performed by: ANESTHESIOLOGY

## 2022-09-14 PROCEDURE — 25000003 PHARM REV CODE 250: Performed by: ANESTHESIOLOGY

## 2022-09-14 PROCEDURE — 64483 NJX AA&/STRD TFRM EPI L/S 1: CPT | Mod: RT | Performed by: ANESTHESIOLOGY

## 2022-09-14 PROCEDURE — 25000003 PHARM REV CODE 250: Performed by: NURSE ANESTHETIST, CERTIFIED REGISTERED

## 2022-09-14 PROCEDURE — 63600175 PHARM REV CODE 636 W HCPCS: Performed by: ANESTHESIOLOGY

## 2022-09-14 PROCEDURE — 63600175 PHARM REV CODE 636 W HCPCS: Performed by: NURSE ANESTHETIST, CERTIFIED REGISTERED

## 2022-09-14 PROCEDURE — 62323 NJX INTERLAMINAR LMBR/SAC: CPT | Performed by: ANESTHESIOLOGY

## 2022-09-14 RX ORDER — LIDOCAINE HYDROCHLORIDE 10 MG/ML
INJECTION, SOLUTION EPIDURAL; INFILTRATION; INTRACAUDAL; PERINEURAL
Status: DISCONTINUED | OUTPATIENT
Start: 2022-09-14 | End: 2022-09-14 | Stop reason: HOSPADM

## 2022-09-14 RX ORDER — ONDANSETRON 2 MG/ML
4 INJECTION INTRAMUSCULAR; INTRAVENOUS DAILY PRN
Status: CANCELLED | OUTPATIENT
Start: 2022-09-14

## 2022-09-14 RX ORDER — SODIUM CHLORIDE, SODIUM LACTATE, POTASSIUM CHLORIDE, CALCIUM CHLORIDE 600; 310; 30; 20 MG/100ML; MG/100ML; MG/100ML; MG/100ML
INJECTION, SOLUTION INTRAVENOUS CONTINUOUS
Status: DISCONTINUED | OUTPATIENT
Start: 2022-09-14 | End: 2022-09-14 | Stop reason: HOSPADM

## 2022-09-14 RX ORDER — BUPIVACAINE HYDROCHLORIDE 2.5 MG/ML
INJECTION, SOLUTION EPIDURAL; INFILTRATION; INTRACAUDAL
Status: DISCONTINUED
Start: 2022-09-14 | End: 2022-09-14 | Stop reason: HOSPADM

## 2022-09-14 RX ORDER — LIDOCAINE HYDROCHLORIDE 10 MG/ML
INJECTION, SOLUTION EPIDURAL; INFILTRATION; INTRACAUDAL; PERINEURAL
Status: DISCONTINUED
Start: 2022-09-14 | End: 2022-09-14 | Stop reason: HOSPADM

## 2022-09-14 RX ORDER — SODIUM CHLORIDE, SODIUM GLUCONATE, SODIUM ACETATE, POTASSIUM CHLORIDE AND MAGNESIUM CHLORIDE 30; 37; 368; 526; 502 MG/100ML; MG/100ML; MG/100ML; MG/100ML; MG/100ML
1000 INJECTION, SOLUTION INTRAVENOUS CONTINUOUS
Status: DISCONTINUED | OUTPATIENT
Start: 2022-09-14 | End: 2022-09-14 | Stop reason: HOSPADM

## 2022-09-14 RX ORDER — DEXAMETHASONE SODIUM PHOSPHATE 10 MG/ML
INJECTION INTRAMUSCULAR; INTRAVENOUS
Status: DISCONTINUED | OUTPATIENT
Start: 2022-09-14 | End: 2022-09-14 | Stop reason: HOSPADM

## 2022-09-14 RX ORDER — DEXAMETHASONE SODIUM PHOSPHATE 10 MG/ML
INJECTION INTRAMUSCULAR; INTRAVENOUS
Status: DISCONTINUED
Start: 2022-09-14 | End: 2022-09-14 | Stop reason: HOSPADM

## 2022-09-14 RX ORDER — BUPIVACAINE HYDROCHLORIDE 2.5 MG/ML
INJECTION, SOLUTION EPIDURAL; INFILTRATION; INTRACAUDAL
Status: DISCONTINUED | OUTPATIENT
Start: 2022-09-14 | End: 2022-09-14 | Stop reason: HOSPADM

## 2022-09-14 RX ORDER — LIDOCAINE HYDROCHLORIDE 20 MG/ML
INJECTION, SOLUTION EPIDURAL; INFILTRATION; INTRACAUDAL; PERINEURAL
Status: DISCONTINUED | OUTPATIENT
Start: 2022-09-14 | End: 2022-09-14

## 2022-09-14 RX ORDER — PROPOFOL 10 MG/ML
VIAL (ML) INTRAVENOUS
Status: DISCONTINUED | OUTPATIENT
Start: 2022-09-14 | End: 2022-09-14

## 2022-09-14 RX ORDER — IOPAMIDOL 408 MG/ML
10 INJECTION, SOLUTION INTRAVASCULAR ONCE
Status: DISCONTINUED | OUTPATIENT
Start: 2022-09-14 | End: 2022-09-14 | Stop reason: HOSPADM

## 2022-09-14 RX ORDER — SODIUM CHLORIDE, SODIUM GLUCONATE, SODIUM ACETATE, POTASSIUM CHLORIDE AND MAGNESIUM CHLORIDE 30; 37; 368; 526; 502 MG/100ML; MG/100ML; MG/100ML; MG/100ML; MG/100ML
1000 INJECTION, SOLUTION INTRAVENOUS CONTINUOUS
Status: CANCELLED | OUTPATIENT
Start: 2022-09-14 | End: 2022-10-14

## 2022-09-14 RX ADMIN — PROPOFOL 50 MG: 10 INJECTION, EMULSION INTRAVENOUS at 08:09

## 2022-09-14 RX ADMIN — LIDOCAINE HYDROCHLORIDE 20 MG: 20 INJECTION, SOLUTION EPIDURAL; INFILTRATION; INTRACAUDAL; PERINEURAL at 08:09

## 2022-09-14 NOTE — DISCHARGE SUMMARY
[facility] - Periop Services  Discharge Note  Short Stay    Procedure(s) (LRB):  Injection,steroid,epidural,transforaminal approach (Bilateral)    OUTCOME: Patient tolerated treatment/procedure well without complication and is now ready for discharge.    DISPOSITION: Home or Self Care    FINAL DIAGNOSIS:  <principal problem not specified>    FOLLOWUP: In clinic    DISCHARGE INSTRUCTIONS:  No discharge procedures on file.     TIME SPENT ON DISCHARGE: 5 minutes

## 2022-09-14 NOTE — ANESTHESIA POSTPROCEDURE EVALUATION
Anesthesia Post Evaluation    Patient: Reji Dozier    Procedure(s) Performed: Procedure(s) (LRB):  Injection,steroid,epidural,transforaminal approach (Bilateral)    Final Anesthesia Type: general      Patient location during evaluation: PACU  Patient participation: Yes- Able to Participate  Level of consciousness: awake and alert  Post-procedure vital signs: reviewed and stable  Pain management: adequate  Airway patency: patent    PONV status at discharge: No PONV  Anesthetic complications: no      Cardiovascular status: hemodynamically stable  Respiratory status: unassisted  Hydration status: euvolemic  Follow-up not needed.          Vitals Value Taken Time   /88 09/14/22 0917   Temp ** 09/14/22 1153   Pulse 71 09/14/22 0924   Resp 20 09/14/22 0903   SpO2 96 % 09/14/22 0924   Vitals shown include unvalidated device data.      No case tracking events are documented in the log.      Pain/Tucker Score: No data recorded

## 2022-09-14 NOTE — OP NOTE
Procedure:    Left L3  transforaminal epidural steroid injection    Right L3  transforaminal epidural steroid injection    Pre-Procedure Diagnoses:  Chronic back pain greater than 3 months  Lumbar degenerative disc disease  Lumbar spondylosis  Lumbar disc displacement    Post-Procedure Diagnoses:  Chronic back pain greater than 3 months  Lumbar degenerative disc disease  Lumbar spondylosis  Lumbar disc displacement    Anesthesia:  Local and MAC    Estimated Blood Loss:  < 2 ML    Consent:  The procedure, risks, benefits, and alternatives were discussed with the patient.  The patient voiced understanding and fully informed written consent was obtained.    Description of the Procedure:  The patient was taken to the operating room and placed in the prone position. The skin overlying the lumbar spine was prepped with Chloraprep and draped in the usual sterile fashion.  An oblique fluoroscopic view was obtained on the left side at L3, with the superior articular process of the inferior vertebral body aligned with the pedicle.  Skin anesthesia was achieved using 2 mL of lidocaine 1%.  A 22-gauge 3.5-inch Quinke spinal needle was inserted and advanced under intermittent fluoroscopic views into the epidural space. Proper needle position was confirmed under AP, oblique, and lateral fluoroscopic views.  Negative aspiration for blood or CSF was confirmed. 1 mL of contrast was injected, which revealed spread into the epidural space.  Then a combination of 5 mg of dexamethasone with 1 mL of 0.25% bupivacaine was easily injected.   There was no pain on injection. The needle was removed intact and bleeding was nil.  The same procedure was repeated in identical fashion on the right side at L3.  Sterile bandages were applied. The patient was taken to the recovery room for further observation in stable condition. The patient was then discharged home with no complications.

## 2022-09-14 NOTE — TRANSFER OF CARE
"Anesthesia Transfer of Care Note    Patient: Reji Dozier    Procedure(s) Performed: Procedure(s) (LRB):  Injection,steroid,epidural,transforaminal approach (Bilateral)    Patient location: OPS    Anesthesia Type: MAC    Transport from OR: Transported from OR on room air with adequate spontaneous ventilation    Post pain: adequate analgesia    Post assessment: no apparent anesthetic complications    Post vital signs: stable    Level of consciousness: awake    Complications: none    Transfer of care protocol was followed      Last vitals:   Visit Vitals  /79   Pulse 75   Temp 36.6 °C (97.9 °F)   Resp 18   Ht 5' 3" (1.6 m)   Wt 67 kg (147 lb 11.3 oz)   SpO2 (!) 94%   BMI 26.17 kg/m²     "

## 2022-09-14 NOTE — ANESTHESIA PREPROCEDURE EVALUATION
09/14/2022  Reji Dozier is a 72 y.o., male with ----------------------------  Arthritis      Comment:  hip  Asthma  Chronic back pain  COPD (chronic obstructive pulmonary disease)  Dyslipidemia  Elevated PSA measurement  Lower extremity weakness  Lumbar radiculitis  Lumbar spinal stenosis  Mixed hyperlipidemia  Nocturia  Radicular low back pain  Skin lesion  Umbilical hernia    And ----------------------------  Colonoscopy  Control bleeding      Comment:  of gastrointestinal tract  Esophagogastroduodenoscopy  Hemorrhoid surgery  Hernia repair  Injection of facet joint  Sclerotherapy with ultrasound guidance  Total hip arthroplasty  Total hip arthroplasty  Transforaminal epidural injection of steroid      Comment:  Procedure: INJECTION, STEROID, EPIDURAL, TRANSFORAMINAL                APPROACH L3;  Surgeon: Bronwyn Hdz MD;  Location:                [facility];  Service: Pain Management;  Laterality:                Bilateral;    Here for LESI - previous LESI without issues .      Pre-op Assessment    I have reviewed the NPO Status.      Review of Systems      Physical Exam  General: Well nourished, Cooperative, Alert and Oriented    Airway:  Mallampati: II   Mouth Opening: Normal  TM Distance: Normal  Tongue: Normal  Neck ROM: Normal ROM    Dental:  Intact    Chest/Lungs:  Clear to auscultation, Normal Respiratory Rate    Heart:  Rate: Normal  Rhythm: Regular Rhythm        Anesthesia Plan  Type of Anesthesia, risks & benefits discussed:    Anesthesia Type: Gen Natural Airway  Intra-op Monitoring Plan: Standard ASA Monitors  Post Op Pain Control Plan: IV/PO Opioids PRN  Induction:  IV  Airway Plan: Direct  Informed Consent: Informed consent signed with the Patient and all parties understand the risks and agree with anesthesia plan.  All questions answered. Patient consented to blood products? No  ASA  Score: 3  Day of Surgery Review of History & Physical: H&P Update referred to the surgeon/provider.  Anesthesia Plan Notes: Nasal cannula vs facemask supplemental oxygenation   For patients with CATHLEEN/obesity, may consider SuperNoval Nasal CPAP      Ready For Surgery From Anesthesia Perspective.     .

## 2022-09-15 ENCOUNTER — HISTORICAL (OUTPATIENT)
Dept: ADMINISTRATIVE | Facility: HOSPITAL | Age: 72
End: 2022-09-15
Payer: MEDICARE

## 2022-09-15 VITALS
RESPIRATION RATE: 20 BRPM | DIASTOLIC BLOOD PRESSURE: 88 MMHG | HEIGHT: 63 IN | HEART RATE: 69 BPM | OXYGEN SATURATION: 96 % | SYSTOLIC BLOOD PRESSURE: 133 MMHG | WEIGHT: 147.69 LBS | TEMPERATURE: 98 F | BODY MASS INDEX: 26.17 KG/M2

## 2022-09-29 ENCOUNTER — OFFICE VISIT (OUTPATIENT)
Dept: ORTHOPEDICS | Facility: CLINIC | Age: 72
End: 2022-09-29
Payer: MEDICARE

## 2022-09-29 VITALS
HEIGHT: 63 IN | SYSTOLIC BLOOD PRESSURE: 144 MMHG | WEIGHT: 147 LBS | DIASTOLIC BLOOD PRESSURE: 87 MMHG | BODY MASS INDEX: 26.05 KG/M2 | HEART RATE: 66 BPM

## 2022-09-29 DIAGNOSIS — M51.26 DISC DISPLACEMENT, LUMBAR: ICD-10-CM

## 2022-09-29 DIAGNOSIS — M54.16 LUMBAR RADICULOPATHY: ICD-10-CM

## 2022-09-29 DIAGNOSIS — G89.29 CHRONIC BACK PAIN GREATER THAN 3 MONTHS DURATION: Primary | ICD-10-CM

## 2022-09-29 DIAGNOSIS — M54.16 LUMBAR RADICULITIS: ICD-10-CM

## 2022-09-29 DIAGNOSIS — M54.9 CHRONIC BACK PAIN GREATER THAN 3 MONTHS DURATION: Primary | ICD-10-CM

## 2022-09-29 PROCEDURE — 99213 OFFICE O/P EST LOW 20 MIN: CPT | Mod: ,,, | Performed by: ANESTHESIOLOGY

## 2022-09-29 PROCEDURE — 99213 PR OFFICE/OUTPT VISIT, EST, LEVL III, 20-29 MIN: ICD-10-PCS | Mod: ,,, | Performed by: ANESTHESIOLOGY

## 2022-09-29 NOTE — H&P (VIEW-ONLY)
Bronwyn Hdz MD        PATIENT NAME: Reji Dozier  : 1950  DATE: 22  MRN: [id number]      Billing Provider: Bronwyn Hdz MD  Level of Service:   Patient PCP Information       Provider PCP Type    Wilton Welch MD General            Reason for Visit / Chief Complaint: Follow-up (UMA L3 TFESI. States noticing improvement with injection, until had accident during basketball recently. States had about 95-98% relief. States currently having constant pain in his lower right side of his back. Pain scale 7-8/10.)       Update PCP  Update Chief Complaint         History of Present Illness / Problem Focused Workflow     Reji Dozier presents to the clinic with Follow-up (UMA L3 TFESI. States noticing improvement with injection, until had accident during basketball recently. States had about 95-98% relief. States currently having constant pain in his lower right side of his back. Pain scale 7-8/10.)     This is a 72-year-old male who returns to clinic today for follow-up of his chronic low back pain.  He underwent bilateral L3 transforaminal epidural steroid injections a couple weeks ago.  He states that he was getting about 95-98% relief of his pain after the procedure, until he had an injury while playing basketball.  He states that someone he was playing with jumped up and pulled him to the ground.  His pain has not really subsided since then.  He currently rates his pain as 7 or 8/10 on the NRS.  He does note that the back pain starts to loosen up once he gets started in moving around.    Back Pain    Follow-up      Review of Systems     Review of Systems   Musculoskeletal:  Positive for back pain.   All other systems reviewed and are negative.     Medical / Social / Family History     Past Medical History:   Diagnosis Date    Arthritis     hip    Asthma     Chronic back pain     COPD (chronic obstructive pulmonary disease)     Dyslipidemia     Elevated PSA measurement     Lower  extremity weakness     Lumbar radiculitis     Lumbar spinal stenosis     Mixed hyperlipidemia     Nocturia     Radicular low back pain     Skin lesion     Umbilical hernia        Past Surgical History:   Procedure Laterality Date    COLONOSCOPY      control bleeding      of gastrointestinal tract    ESOPHAGOGASTRODUODENOSCOPY      HEMORRHOID SURGERY      HERNIA REPAIR      INJECTION OF FACET JOINT Bilateral     SCLEROTHERAPY WITH ULTRASOUND GUIDANCE      TOTAL HIP ARTHROPLASTY Right     TOTAL HIP ARTHROPLASTY Left     TRANSFORAMINAL EPIDURAL INJECTION OF STEROID Bilateral 5/4/2022    Procedure: INJECTION, STEROID, EPIDURAL, TRANSFORAMINAL APPROACH L3;  Surgeon: Bronwyn Hdz MD;  Location: [facility] OR;  Service: Pain Management;  Laterality: Bilateral;    TRANSFORAMINAL EPIDURAL INJECTION OF STEROID Bilateral 9/14/2022    Procedure: Injection,steroid,epidural,transforaminal approach;  Surgeon: Bronwyn Hdz MD;  Location: [facility] OR;  Service: Pain Management;  Laterality: Bilateral;  L3       Social History  Mr. Dozier  reports that he has quit smoking. His smoking use included cigarettes. He has never used smokeless tobacco. He reports current alcohol use of about 1.0 standard drink per week. He reports that he does not use drugs.    Family History  's Jacoby Family history is unknown by patient.    Medications and Allergies     Medications  Outpatient Medications Marked as Taking for the 9/29/22 encounter (Office Visit) with Bronwyn Hdz MD   Medication Sig Dispense Refill    albuterol (PROVENTIL/VENTOLIN HFA) 90 mcg/actuation inhaler Inhale 6.7 g into the lungs as needed.      ascorbic acid, vitamin C, 250 mg Chew Take 1 tablet by mouth once daily.      atorvastatin (LIPITOR) 10 MG tablet Take 10 mg by mouth once daily.      budesonide-formoterol 160-4.5 mcg (SYMBICORT) 160-4.5 mcg/actuation HFAA Inhale 2 puffs into the lungs every 12 (twelve) hours. Controller 10.2 g 3    fluticasone propionate  (FLONASE) 50 mcg/actuation nasal spray SPRAY 2 SPRAYS INTO EACH NOSTRIL TWICE A DAY (Patient taking differently: 2 sprays once daily. SPRAY 2 SPRAYS INTO EACH NOSTRIL TWICE A DAY) 48 mL 3    fluticasone-salmeterol diskus inhaler 100-50 mcg Inhale 1 puff into the lungs 2 (two) times daily. Controller      gabapentin (NEURONTIN) 100 MG capsule Take 1 capsule (100 mg total) by mouth once daily. (Patient taking differently: Take 100 mg by mouth as needed.) 30 capsule 6    loratadine (CLARITIN) 10 mg tablet Take 1 tablet (10 mg total) by mouth once daily. 90 tablet 3    multivitamin capsule Take 1 capsule by mouth once daily.      ondansetron (ZOFRAN) 4 MG tablet Take 4 mg by mouth as needed.      tiZANidine (ZANAFLEX) 2 MG tablet TAKE 1 TABLET BY MOUTH EVERY 8 HOURS AS NEEDED FOR MUSCLE SPASM 90 tablet 1    vitamin D (VITAMIN D3) 1000 units Tab Take 1,000 Units by mouth once daily.         Allergies  Review of patient's allergies indicates:  No Known Allergies      Physical Examination     Vitals:    09/29/22 1022   BP: (!) 144/87   Pulse: 66     Spine Musculoskeletal Exam    Gait    Gait is normal.    Inspection    Leg length disparity: no discrepancy    Thoracolumbar    Erythema: none    Swelling: none    Edema (right lower extremity): none    Edema (left lower extremity): none    Ecchymosis: none    Deformity: none    Strength    Thoracolumbar    Thoracolumbar motor exam is normal.       Sensory    Thoracolumbar    Thoracolumbar sensation is normal.    General      Constitutional: appears stated age, well-developed and well-nourished    Scleral icterus: no    Labored breathing: no    Psychiatric: normal mood and affect and no acute distress    Neurological: alert and oriented x3    Skin: intact     Assessment and Plan (including Health Maintenance)      Problem List  Smart Sets  Document Outside HM   :    Plan:   Chronic back pain greater than 3 months duration    Disc displacement, lumbar    Lumbar radiculitis    I  am scheduling him again today for bilateral L3 transforaminal epidural steroid injections.  He previously had about 3 months of relief from the injection done in May.  He was getting 95-98% relief of his pain after his injection a couple weeks ago until an injury while playing basketball.  The plan was discussed with the patient and he wishes to proceed.    Problem List Items Addressed This Visit          Orthopedic Problems    Disc displacement, lumbar       Other    Chronic back pain greater than 3 months duration - Primary    Lumbar radiculitis         Future Appointments   Date Time Provider Department Center   11/28/2022  9:30 [facility]        There are no Patient Instructions on file for this visit.  No follow-ups on file.     Signature:  Bronwyn Hdz MD      Date of encounter: 9/29/22

## 2022-09-29 NOTE — PROGRESS NOTES
Bronwyn Hdz MD        PATIENT NAME: Reji Dozier  : 1950  DATE: 22  MRN: [id number]      Billing Provider: Bronwyn Hdz MD  Level of Service:   Patient PCP Information       Provider PCP Type    Wilton Welch MD General            Reason for Visit / Chief Complaint: Follow-up (UMA L3 TFESI. States noticing improvement with injection, until had accident during basketball recently. States had about 95-98% relief. States currently having constant pain in his lower right side of his back. Pain scale 7-8/10.)       Update PCP  Update Chief Complaint         History of Present Illness / Problem Focused Workflow     Reji Dozier presents to the clinic with Follow-up (UMA L3 TFESI. States noticing improvement with injection, until had accident during basketball recently. States had about 95-98% relief. States currently having constant pain in his lower right side of his back. Pain scale 7-8/10.)     This is a 72-year-old male who returns to clinic today for follow-up of his chronic low back pain.  He underwent bilateral L3 transforaminal epidural steroid injections a couple weeks ago.  He states that he was getting about 95-98% relief of his pain after the procedure, until he had an injury while playing basketball.  He states that someone he was playing with jumped up and pulled him to the ground.  His pain has not really subsided since then.  He currently rates his pain as 7 or 8/10 on the NRS.  He does note that the back pain starts to loosen up once he gets started in moving around.    Back Pain    Follow-up      Review of Systems     Review of Systems   Musculoskeletal:  Positive for back pain.   All other systems reviewed and are negative.     Medical / Social / Family History     Past Medical History:   Diagnosis Date    Arthritis     hip    Asthma     Chronic back pain     COPD (chronic obstructive pulmonary disease)     Dyslipidemia     Elevated PSA measurement     Lower  extremity weakness     Lumbar radiculitis     Lumbar spinal stenosis     Mixed hyperlipidemia     Nocturia     Radicular low back pain     Skin lesion     Umbilical hernia        Past Surgical History:   Procedure Laterality Date    COLONOSCOPY      control bleeding      of gastrointestinal tract    ESOPHAGOGASTRODUODENOSCOPY      HEMORRHOID SURGERY      HERNIA REPAIR      INJECTION OF FACET JOINT Bilateral     SCLEROTHERAPY WITH ULTRASOUND GUIDANCE      TOTAL HIP ARTHROPLASTY Right     TOTAL HIP ARTHROPLASTY Left     TRANSFORAMINAL EPIDURAL INJECTION OF STEROID Bilateral 5/4/2022    Procedure: INJECTION, STEROID, EPIDURAL, TRANSFORAMINAL APPROACH L3;  Surgeon: Bronwyn Hdz MD;  Location: [facility] OR;  Service: Pain Management;  Laterality: Bilateral;    TRANSFORAMINAL EPIDURAL INJECTION OF STEROID Bilateral 9/14/2022    Procedure: Injection,steroid,epidural,transforaminal approach;  Surgeon: Bronwyn Hdz MD;  Location: [facility] OR;  Service: Pain Management;  Laterality: Bilateral;  L3    TRANSFORAMINAL EPIDURAL INJECTION OF STEROID Bilateral 10/26/2022    Procedure: Injection,steroid,epidural,transforaminal approach;  Surgeon: Bronwyn Hdz MD;  Location: [facility] OR;  Service: Pain Management;  Laterality: Bilateral;  L3       Social History  Mr. Dozier  reports that he has quit smoking. His smoking use included cigarettes. He has never used smokeless tobacco. He reports current alcohol use of about 1.0 standard drink per week. He reports that he does not use drugs.    Family History  Mr.'s Dozier Family history is unknown by patient.    Medications and Allergies     Medications  Outpatient Medications Marked as Taking for the 9/29/22 encounter (Office Visit) with Bronwyn Hdz MD   Medication Sig Dispense Refill    albuterol (PROVENTIL/VENTOLIN HFA) 90 mcg/actuation inhaler Inhale 6.7 g into the lungs as needed.      ascorbic acid, vitamin C, 250 mg Chew Take 1 tablet by mouth once daily.      atorvastatin  (LIPITOR) 10 MG tablet Take 10 mg by mouth every evening.      fluticasone propionate (FLONASE) 50 mcg/actuation nasal spray SPRAY 2 SPRAYS INTO EACH NOSTRIL TWICE A DAY (Patient taking differently: 2 sprays once daily. SPRAY 2 SPRAYS INTO EACH NOSTRIL) 48 mL 3    gabapentin (NEURONTIN) 100 MG capsule Take 1 capsule (100 mg total) by mouth once daily. (Patient taking differently: Take 100 mg by mouth as needed.) 30 capsule 6    loratadine (CLARITIN) 10 mg tablet Take 1 tablet (10 mg total) by mouth once daily. (Patient taking differently: Take 10 mg by mouth daily as needed.) 90 tablet 3    multivitamin capsule Take 1 capsule by mouth once daily.      ondansetron (ZOFRAN) 4 MG tablet Take 4 mg by mouth as needed.      tiZANidine (ZANAFLEX) 2 MG tablet TAKE 1 TABLET BY MOUTH EVERY 8 HOURS AS NEEDED FOR MUSCLE SPASM (Patient taking differently: Take 2 mg by mouth every 8 (eight) hours as needed.) 90 tablet 1    vitamin D (VITAMIN D3) 1000 units Tab Take 1,000 Units by mouth once daily.      [DISCONTINUED] budesonide-formoterol 160-4.5 mcg (SYMBICORT) 160-4.5 mcg/actuation HFAA Inhale 2 puffs into the lungs every 12 (twelve) hours. Controller 10.2 g 3    [DISCONTINUED] fluticasone-salmeterol diskus inhaler 100-50 mcg Inhale 1 puff into the lungs 2 (two) times daily. Controller         Allergies  Review of patient's allergies indicates:  No Known Allergies      Physical Examination     Vitals:    09/29/22 1022   BP: (!) 144/87   Pulse: 66     Spine Musculoskeletal Exam    Gait    Gait is normal.    Inspection    Leg length disparity: no discrepancy    Thoracolumbar    Erythema: none    Swelling: none    Edema (right lower extremity): none    Edema (left lower extremity): none    Ecchymosis: none    Deformity: none    Strength    Thoracolumbar    Thoracolumbar motor exam is normal.       Sensory    Thoracolumbar    Thoracolumbar sensation is normal.    General      Constitutional: appears stated age, well-developed and  well-nourished    Scleral icterus: no    Labored breathing: no    Psychiatric: normal mood and affect and no acute distress    Neurological: alert and oriented x3    Skin: intact     Assessment and Plan (including Health Maintenance)      Problem List  Smart Sets  Document Outside HM   :    Plan:   Chronic back pain greater than 3 months duration    Disc displacement, lumbar  -     Case Request Operating Room - [facility]: Injection,steroid,epidural,transforaminal approach    Lumbar radiculitis  -     Case Request Operating Room - [facility]: Injection,steroid,epidural,transforaminal approach    Lumbar radiculopathy    I am scheduling him again today for bilateral L3 transforaminal epidural steroid injections.  He previously had about 3 months of relief from the injection done in May.  He was getting 95-98% relief of his pain after his injection a couple weeks ago until an injury while playing basketball.  The plan was discussed with the patient and he wishes to proceed.    Problem List Items Addressed This Visit          Orthopedic Problems    Disc displacement, lumbar       Other    Chronic back pain greater than 3 months duration - Primary    Lumbar radiculopathy         Future Appointments   Date Time Provider Department Center   11/10/2022 10:45 AM Bronwyn Hdz MD [facility]   11/28/2022  9:30 AM Wilton Welch MD [facility]        There are no Patient Instructions on file for this visit.  No follow-ups on file.     Signature:  Bronwyn Hdz MD      Date of encounter: 9/29/22

## 2022-10-25 ENCOUNTER — ANESTHESIA EVENT (OUTPATIENT)
Dept: SURGERY | Facility: HOSPITAL | Age: 72
End: 2022-10-25
Payer: MEDICARE

## 2022-10-25 NOTE — DISCHARGE INSTRUCTIONS
MEDIAL BRANCH BLOCK/ EPIDURAL STEROID INJECTION, CARE AFTER    These instructions give you information on caring for yourself after your procedure. Your doctor may also give you specific instructions. Call your doctor if you have any problems or questions after your procedure.     HOME CARE  Do not drive or use any machinery for the next 24 hours.  Do not do any hard physical activity for the next 24 hours  No heavy lifting for one week  Apply ice pack for comfort  Do not use a heating pad in area of injection  You may go back to eating as usual  Remove bandaids tomorrow and then you may shower  No tub soaking for 3-5 days    SIDE EFFECTS THAT COULD HAPPEN UP TO 4 HOURS AFTER THE INJECTION  If you have leg weakness or numbness, have someone help you walk. If the weakness or numbness does not go away, or if it gets worse go to the emergency department.  If you have dizziness, lie down right away. This usually helps. Sit up slowly and then when you have been sitting for a few minutes then stand up.  If you have a mild headache, drink fluids (especially drinks with caffeine) Call your doctor if the headache gets worse or persists.  When the numbing medicine wears off you may feel some discomfort where you had the shot. It usually only lasts for a few days. You may put ice over the injection site. Leave ice on for 20 minutes at a time and protect your skin during use.   You may feel minor back pain and stiffness at the site of the shot. Call your doctor if this pain gets worse or does not improve. You may feel nauseous or vomit several hours after your procedure. If this happens, try drinking small amounts of clear liquids until you feel better. If you continue to feel nauseated or continue vomiting, get help right away.    Steroids may take several days to start working. The shot usually helps leg pain more than back pain. The shot will not fix what is causing the pain but may take away some of the pain. This pain  relief may last from 2 weeks to 6 months.     GET HELP RIGHT AWAY IF:  You have very bad pain, headache or neck pain or stiffness  There is a change in your vision (double or blurry vision)  You have a fever over 101 or chills  You have swelling or redness at the injection site  You get weaker  You are not able to control your bladder or bowels  You are not able to urinate

## 2022-10-26 ENCOUNTER — HOSPITAL ENCOUNTER (OUTPATIENT)
Facility: HOSPITAL | Age: 72
Discharge: HOME | End: 2022-10-26
Attending: ANESTHESIOLOGY | Admitting: ANESTHESIOLOGY
Payer: MEDICARE

## 2022-10-26 ENCOUNTER — ANESTHESIA (OUTPATIENT)
Dept: SURGERY | Facility: HOSPITAL | Age: 72
End: 2022-10-26
Payer: MEDICARE

## 2022-10-26 DIAGNOSIS — G89.29 CHRONIC BACK PAIN GREATER THAN 3 MONTHS DURATION: Primary | ICD-10-CM

## 2022-10-26 DIAGNOSIS — M54.9 CHRONIC BACK PAIN GREATER THAN 3 MONTHS DURATION: Primary | ICD-10-CM

## 2022-10-26 PROCEDURE — 64483 NJX AA&/STRD TFRM EPI L/S 1: CPT | Performed by: ANESTHESIOLOGY

## 2022-10-26 PROCEDURE — 63600175 PHARM REV CODE 636 W HCPCS: Performed by: ANESTHESIOLOGY

## 2022-10-26 PROCEDURE — 25000003 PHARM REV CODE 250: Performed by: ANESTHESIOLOGY

## 2022-10-26 PROCEDURE — 25000003 PHARM REV CODE 250: Performed by: NURSE ANESTHETIST, CERTIFIED REGISTERED

## 2022-10-26 PROCEDURE — 37000008 HC ANESTHESIA 1ST 15 MINUTES: Performed by: ANESTHESIOLOGY

## 2022-10-26 PROCEDURE — 64483 NJX AA&/STRD TFRM EPI L/S 1: CPT | Mod: 50,ICN,, | Performed by: ANESTHESIOLOGY

## 2022-10-26 PROCEDURE — 63600175 PHARM REV CODE 636 W HCPCS: Performed by: NURSE ANESTHETIST, CERTIFIED REGISTERED

## 2022-10-26 PROCEDURE — 64483 PR EPIDURAL INJ, ANES/STEROID, TRANSFORAMINAL, LUMB/SACR, SNGL LEVL: ICD-10-PCS | Mod: 50,ICN,, | Performed by: ANESTHESIOLOGY

## 2022-10-26 RX ORDER — DEXAMETHASONE SODIUM PHOSPHATE 10 MG/ML
INJECTION INTRAMUSCULAR; INTRAVENOUS
Status: DISCONTINUED
Start: 2022-10-26 | End: 2022-10-26 | Stop reason: HOSPADM

## 2022-10-26 RX ORDER — BUPIVACAINE HYDROCHLORIDE 2.5 MG/ML
INJECTION, SOLUTION EPIDURAL; INFILTRATION; INTRACAUDAL
Status: DISCONTINUED
Start: 2022-10-26 | End: 2022-10-26 | Stop reason: HOSPADM

## 2022-10-26 RX ORDER — LIDOCAINE HYDROCHLORIDE 10 MG/ML
INJECTION, SOLUTION EPIDURAL; INFILTRATION; INTRACAUDAL; PERINEURAL
Status: DISCONTINUED | OUTPATIENT
Start: 2022-10-26 | End: 2022-10-26 | Stop reason: HOSPADM

## 2022-10-26 RX ORDER — LIDOCAINE HYDROCHLORIDE 10 MG/ML
INJECTION, SOLUTION EPIDURAL; INFILTRATION; INTRACAUDAL; PERINEURAL
Status: DISCONTINUED
Start: 2022-10-26 | End: 2022-10-26 | Stop reason: HOSPADM

## 2022-10-26 RX ORDER — SODIUM CHLORIDE, SODIUM GLUCONATE, SODIUM ACETATE, POTASSIUM CHLORIDE AND MAGNESIUM CHLORIDE 30; 37; 368; 526; 502 MG/100ML; MG/100ML; MG/100ML; MG/100ML; MG/100ML
INJECTION, SOLUTION INTRAVENOUS CONTINUOUS
Status: CANCELLED | OUTPATIENT
Start: 2022-10-26 | End: 2022-11-25

## 2022-10-26 RX ORDER — PROPOFOL 10 MG/ML
VIAL (ML) INTRAVENOUS
Status: DISCONTINUED | OUTPATIENT
Start: 2022-10-26 | End: 2022-10-26

## 2022-10-26 RX ORDER — DEXAMETHASONE SODIUM PHOSPHATE 10 MG/ML
INJECTION INTRAMUSCULAR; INTRAVENOUS
Status: DISCONTINUED | OUTPATIENT
Start: 2022-10-26 | End: 2022-10-26 | Stop reason: HOSPADM

## 2022-10-26 RX ORDER — LIDOCAINE HYDROCHLORIDE 10 MG/ML
1 INJECTION, SOLUTION EPIDURAL; INFILTRATION; INTRACAUDAL; PERINEURAL ONCE
Status: CANCELLED | OUTPATIENT
Start: 2022-10-26 | End: 2022-10-26

## 2022-10-26 RX ORDER — LIDOCAINE HYDROCHLORIDE 20 MG/ML
INJECTION INTRAVENOUS
Status: DISCONTINUED | OUTPATIENT
Start: 2022-10-26 | End: 2022-10-26

## 2022-10-26 RX ORDER — BUPIVACAINE HYDROCHLORIDE 2.5 MG/ML
INJECTION, SOLUTION EPIDURAL; INFILTRATION; INTRACAUDAL
Status: DISCONTINUED | OUTPATIENT
Start: 2022-10-26 | End: 2022-10-26 | Stop reason: HOSPADM

## 2022-10-26 RX ADMIN — PROPOFOL 75 MG: 10 INJECTION, EMULSION INTRAVENOUS at 08:10

## 2022-10-26 RX ADMIN — LIDOCAINE HYDROCHLORIDE 50 MG: 20 INJECTION INTRAVENOUS at 08:10

## 2022-10-26 NOTE — TRANSFER OF CARE
"Anesthesia Transfer of Care Note    Patient: Reji Dozier    Procedure(s) Performed: Procedure(s) (LRB):  Injection,steroid,epidural,transforaminal approach (Bilateral)    Patient location: OPS    Anesthesia Type: general    Transport from OR: Transported from OR on room air with adequate spontaneous ventilation    Post pain: adequate analgesia    Post assessment: no apparent anesthetic complications    Post vital signs: stable    Level of consciousness: awake, alert and oriented    Nausea/Vomiting: no nausea/vomiting    Complications: none    Transfer of care protocol was followed      Last vitals:   Visit Vitals  /76   Pulse 84   Temp 36.5 °C (97.7 °F)   Resp 18   Ht 5' 2" (1.575 m)   Wt 68.2 kg (150 lb 5.7 oz)   SpO2 96%   BMI 27.50 kg/m²     "

## 2022-10-26 NOTE — ANESTHESIA POSTPROCEDURE EVALUATION
Anesthesia Post Evaluation    Patient: Reji Dozier    Procedure(s) Performed: Procedure(s) (LRB):  Injection,steroid,epidural,transforaminal approach (Bilateral)    Final Anesthesia Type: general (/Regional//MAC)      Patient location during evaluation: PACU  Post-procedure mental status: @ basline.  Post-procedure vital signs: reviewed and stable  Pain management: adequate    PONV status: See postop meds for drugs used to control n/v if any.  Anesthetic complications: no      Cardiovascular status: blood pressure returned to baseline  Respiratory status: @ baseline.  Hydration status: euvolemic            Vitals Value Taken Time   /82 10/26/22 0901   Temp 36.4 °C (97.6 °F) 10/26/22 0852   Pulse 63 10/26/22 0902   Resp 20 10/26/22 0900   SpO2 96 % 10/26/22 0902   Vitals shown include unvalidated device data.      No case tracking events are documented in the log.      Pain/Tucker Score: Tucker Score: 9 (10/26/2022  8:53 AM)  Modified Tucker Score: 18 (10/26/2022  8:53 AM)

## 2022-10-26 NOTE — ANESTHESIA PREPROCEDURE EVALUATION
10/26/2022  Reji Dozier is a 72 y.o., male.      Pre-op Assessment    I have reviewed the Patient Summary Reports.     I have reviewed the Nursing Notes. I have reviewed the NPO Status.   I have reviewed the Medications.     Review of Systems      Physical Exam  General: Well nourished and Cooperative    Airway:  Mallampati: II   Mouth Opening: Normal  TM Distance: Normal  Tongue: Normal  Neck ROM: Normal ROM    Chest/Lungs:  Clear to auscultation        Anesthesia Plan  Type of Anesthesia, risks & benefits discussed:    Anesthesia Type: Gen Natural Airway  Intra-op Monitoring Plan: Standard ASA Monitors  Induction:  IV  Informed Consent: Informed consent signed with the Patient and all parties understand the risks and agree with anesthesia plan.  All questions answered.   ASA Score: 2  Day of Surgery Review of History & Physical: H&P Update referred to the surgeon/provider.    Ready For Surgery From Anesthesia Perspective.     .   I explained anesthesia plan to patient/responsbile party if available.  Anesthesia consent done going over the material facts, risks, complications & alternatives, obtained which includes the possibility of altering the anesthesia plan.  I reviewed problem list, appropriate labs, any workup, Xray, EKG etc noted below.  Patients condition is satisfactory to proceed with anesthesia plan unless otherwise noted (see anesthesia chart for details of the anesthesia plan carried out).  Pt is here for a pain injection.  These usually only require MAC anesthesia.  Pain doctor requests IV GA.  Will provide this using IV propofol as needed.  The pain doctor has reviewed the patients meds including any anticoagulant meds.    Pre-operative evaluation for Procedure(s) (LRB):  Injection,steroid,epidural,transforaminal approach (Bilateral)    /89   Pulse 70   Temp 36.5 °C (97.7 °F)  "(Tympanic)   Resp 16   Ht 5' 2" (1.575 m)   Wt 68.2 kg (150 lb 5.7 oz)   SpO2 96%   BMI 27.50 kg/m²     Patient Active Problem List   Diagnosis    Chronic back pain greater than 3 months duration    Disc displacement, lumbar    Lumbar radiculitis    Moderate COPD (chronic obstructive pulmonary disease)    HLD (hyperlipidemia)    Pharyngitis    Acute upper respiratory infection, unspecified        Review of patient's allergies indicates:  No Known Allergies    Current Outpatient Medications   Medication Instructions    albuterol (PROVENTIL/VENTOLIN HFA) 6.7 g, Inhalation, As needed (PRN)    albuterol-ipratropium (DUO-NEB) 2.5 mg-0.5 mg/3 mL nebulizer solution 3 mLs, Nebulization, Every 6 hours PRN, Rescue    ascorbic acid, vitamin C, 250 mg Chew 1 tablet, Oral, Daily    atorvastatin (LIPITOR) 10 mg, Oral, Nightly    budesonide-formoterol 160-4.5 mcg (SYMBICORT) 160-4.5 mcg/actuation HFAA 2 puffs, Inhalation, Every 12 hours, Controller    fluticasone propionate (FLONASE) 50 mcg/actuation nasal spray SPRAY 2 SPRAYS INTO EACH NOSTRIL TWICE A DAY    fluticasone-umeclidin-vilanter (TRELEGY ELLIPTA) 100-62.5-25 mcg DsDv 1 puff, Inhalation, Daily    gabapentin (NEURONTIN) 100 mg, Oral, Daily    loratadine (CLARITIN) 10 mg, Oral, Daily    multivitamin capsule 1 capsule, Oral, Daily    ondansetron (ZOFRAN) 4 mg, Oral, As needed (PRN)    tiZANidine (ZANAFLEX) 2 MG tablet TAKE 1 TABLET BY MOUTH EVERY 8 HOURS AS NEEDED FOR MUSCLE SPASM    vitamin D (VITAMIN D3) 1,000 Units, Oral, Daily       Past Surgical History:   Procedure Laterality Date    COLONOSCOPY      control bleeding      of gastrointestinal tract    ESOPHAGOGASTRODUODENOSCOPY      HEMORRHOID SURGERY      HERNIA REPAIR      INJECTION OF FACET JOINT Bilateral     SCLEROTHERAPY WITH ULTRASOUND GUIDANCE      TOTAL HIP ARTHROPLASTY Right     TOTAL HIP ARTHROPLASTY Left     TRANSFORAMINAL EPIDURAL INJECTION OF STEROID Bilateral 5/4/2022 "    Procedure: INJECTION, STEROID, EPIDURAL, TRANSFORAMINAL APPROACH L3;  Surgeon: Bronwyn Hdz MD;  Location: [facility] OR;  Service: Pain Management;  Laterality: Bilateral;    TRANSFORAMINAL EPIDURAL INJECTION OF STEROID Bilateral 9/14/2022    Procedure: Injection,steroid,epidural,transforaminal approach;  Surgeon: Bronwyn Hdz MD;  Location: [facility] OR;  Service: Pain Management;  Laterality: Bilateral;  L3       Social History     Socioeconomic History    Marital status:    Tobacco Use    Smoking status: Former     Types: Cigarettes    Smokeless tobacco: Never   Substance and Sexual Activity    Alcohol use: Yes     Alcohol/week: 1.0 standard drink     Types: 1 Cans of beer per week     Comment: daily    Drug use: Never    Sexual activity: Yes       Lab Results   Component Value Date    WBC 9.7 06/24/2022    HGB 14.7 06/24/2022    HCT 44.2 06/24/2022    MCV 86.7 06/24/2022     06/24/2022          BMP  Lab Results   Component Value Date    HCT 44.2 06/24/2022     06/24/2022    K 4.9 06/24/2022    BUN 7.8 (L) 06/24/2022    CREATININE 0.86 06/24/2022    CALCIUM 9.9 06/24/2022        INR  No results for input(s): PT, INR, PROTIME, APTT in the last 72 hours.        Diagnostic Studies:      EKG:  No results found for this or any previous visit.

## 2022-10-26 NOTE — DISCHARGE SUMMARY
[facility] - Periop Services  Discharge Note  Short Stay    Procedure(s) (LRB):  Injection,steroid,epidural,transforaminal approach (Bilateral)      OUTCOME: Patient tolerated treatment/procedure well without complication and is now ready for discharge.    DISPOSITION: Home or Self Care    FINAL DIAGNOSIS:  <principal problem not specified>    FOLLOWUP: In clinic    DISCHARGE INSTRUCTIONS:  No discharge procedures on file.     TIME SPENT ON DISCHARGE: 5 minutes

## 2022-10-27 VITALS
WEIGHT: 150.38 LBS | OXYGEN SATURATION: 96 % | HEART RATE: 75 BPM | RESPIRATION RATE: 20 BRPM | BODY MASS INDEX: 27.67 KG/M2 | DIASTOLIC BLOOD PRESSURE: 82 MMHG | SYSTOLIC BLOOD PRESSURE: 127 MMHG | HEIGHT: 62 IN | TEMPERATURE: 98 F

## 2022-11-10 ENCOUNTER — OFFICE VISIT (OUTPATIENT)
Dept: ORTHOPEDICS | Facility: CLINIC | Age: 72
End: 2022-11-10
Payer: MEDICARE

## 2022-11-10 ENCOUNTER — TELEPHONE (OUTPATIENT)
Dept: FAMILY MEDICINE | Facility: CLINIC | Age: 72
End: 2022-11-10
Payer: MEDICARE

## 2022-11-10 VITALS
DIASTOLIC BLOOD PRESSURE: 80 MMHG | HEART RATE: 64 BPM | HEIGHT: 62 IN | SYSTOLIC BLOOD PRESSURE: 130 MMHG | BODY MASS INDEX: 27.6 KG/M2 | WEIGHT: 150 LBS

## 2022-11-10 DIAGNOSIS — R97.20 ELEVATED PROSTATE SPECIFIC ANTIGEN (PSA): Primary | ICD-10-CM

## 2022-11-10 DIAGNOSIS — Z00.00 WELLNESS EXAMINATION: ICD-10-CM

## 2022-11-10 DIAGNOSIS — R97.20 ELEVATED PSA: ICD-10-CM

## 2022-11-10 DIAGNOSIS — M54.16 LUMBAR RADICULOPATHY: Primary | ICD-10-CM

## 2022-11-10 DIAGNOSIS — E78.5 HYPERLIPIDEMIA, UNSPECIFIED HYPERLIPIDEMIA TYPE: ICD-10-CM

## 2022-11-10 DIAGNOSIS — Z12.5 ENCOUNTER FOR SCREENING FOR MALIGNANT NEOPLASM OF PROSTATE: ICD-10-CM

## 2022-11-10 DIAGNOSIS — M54.9 CHRONIC BACK PAIN GREATER THAN 3 MONTHS DURATION: ICD-10-CM

## 2022-11-10 DIAGNOSIS — M51.26 DISC DISPLACEMENT, LUMBAR: ICD-10-CM

## 2022-11-10 DIAGNOSIS — G89.29 CHRONIC BACK PAIN GREATER THAN 3 MONTHS DURATION: ICD-10-CM

## 2022-11-10 PROCEDURE — 99213 OFFICE O/P EST LOW 20 MIN: CPT | Mod: ,,, | Performed by: ANESTHESIOLOGY

## 2022-11-10 PROCEDURE — 99213 PR OFFICE/OUTPT VISIT, EST, LEVL III, 20-29 MIN: ICD-10-PCS | Mod: ,,, | Performed by: ANESTHESIOLOGY

## 2022-11-10 NOTE — PROGRESS NOTES
Bronwyn Hdz MD        PATIENT NAME: Reji Dozier  : 1950  DATE: 11/10/22  MRN: [id number]      Billing Provider: Bronwyn Hdz MD  Level of Service:   Patient PCP Information       Provider PCP Type    Wilton Welch MD General            Reason for Visit / Chief Complaint: Post-op Evaluation (Follow up post op injections. Patient states the injections did help. Pain is a 1/10. Taking prescription medication for pain. States he does exercise. )       Update PCP  Update Chief Complaint         History of Present Illness / Problem Focused Workflow     Reji Dozier presents to the clinic with Post-op Evaluation (Follow up post op injections. Patient states the injections did help. Pain is a 1/10. Taking prescription medication for pain. States he does exercise. )     This is a 72-year-old male who returns to clinic today for follow-up of his chronic low back pain.  He underwent bilateral L3 transforaminal epidural steroid injections a couple weeks ago.  He states that he is getting excellent relief of his back pain since then. He washed and waxed a friend's car yesterday and thinks he overdid it, so he does have a little soreness in his back today. He currently rates his pain as 1/10 on the NRS.    Back Pain    Follow-up      Review of Systems     Review of Systems   Musculoskeletal:  Positive for back pain.   All other systems reviewed and are negative.     Medical / Social / Family History     Past Medical History:   Diagnosis Date    Arthritis     hip    Asthma     Chronic back pain     COPD (chronic obstructive pulmonary disease)     Dyslipidemia     Elevated PSA measurement     Lower extremity weakness     Lumbar radiculitis     Lumbar spinal stenosis     Mixed hyperlipidemia     Nocturia     Radicular low back pain     Skin lesion     Umbilical hernia        Past Surgical History:   Procedure Laterality Date    COLONOSCOPY      control bleeding      of gastrointestinal tract     ESOPHAGOGASTRODUODENOSCOPY      HEMORRHOID SURGERY      HERNIA REPAIR      INJECTION OF FACET JOINT Bilateral     SCLEROTHERAPY WITH ULTRASOUND GUIDANCE      TOTAL HIP ARTHROPLASTY Right     TOTAL HIP ARTHROPLASTY Left     TRANSFORAMINAL EPIDURAL INJECTION OF STEROID Bilateral 5/4/2022    Procedure: INJECTION, STEROID, EPIDURAL, TRANSFORAMINAL APPROACH L3;  Surgeon: Bronwyn Hdz MD;  Location: [facility] OR;  Service: Pain Management;  Laterality: Bilateral;    TRANSFORAMINAL EPIDURAL INJECTION OF STEROID Bilateral 9/14/2022    Procedure: Injection,steroid,epidural,transforaminal approach;  Surgeon: Bronwyn Hdz MD;  Location: [facility] OR;  Service: Pain Management;  Laterality: Bilateral;  L3    TRANSFORAMINAL EPIDURAL INJECTION OF STEROID Bilateral 10/26/2022    Procedure: Injection,steroid,epidural,transforaminal approach;  Surgeon: Bronwyn Hdz MD;  Location: [facility] OR;  Service: Pain Management;  Laterality: Bilateral;  L3       Social History  Mr. Dozier  reports that he has quit smoking. His smoking use included cigarettes. He has never used smokeless tobacco. He reports current alcohol use of about 1.0 standard drink per week. He reports that he does not use drugs.    Family History  Mr.'s Dozier Family history is unknown by patient.    Medications and Allergies     Medications  Outpatient Medications Marked as Taking for the 11/10/22 encounter (Office Visit) with Bronwyn Hdz MD   Medication Sig Dispense Refill    albuterol (PROVENTIL/VENTOLIN HFA) 90 mcg/actuation inhaler Inhale 6.7 g into the lungs as needed.      ascorbic acid, vitamin C, 250 mg Chew Take 1 tablet by mouth once daily.      atorvastatin (LIPITOR) 10 MG tablet Take 10 mg by mouth every evening.      budesonide-formoterol 160-4.5 mcg (SYMBICORT) 160-4.5 mcg/actuation HFAA INHALE 2 PUFFS BY MOUTH INTO THE LUNGS EVERY 12 (TWELVE) HOURS. CONTROLLER 6 g 3    fluticasone propionate (FLONASE) 50 mcg/actuation nasal spray SPRAY 2 SPRAYS INTO  EACH NOSTRIL TWICE A DAY (Patient taking differently: 2 sprays once daily. SPRAY 2 SPRAYS INTO EACH NOSTRIL) 48 mL 3    fluticasone-umeclidin-vilanter (TRELEGY ELLIPTA) 100-62.5-25 mcg DsDv Inhale 1 puff into the lungs once daily. 28 each 11    gabapentin (NEURONTIN) 100 MG capsule Take 1 capsule (100 mg total) by mouth once daily. (Patient taking differently: Take 100 mg by mouth as needed.) 30 capsule 6    loratadine (CLARITIN) 10 mg tablet Take 1 tablet (10 mg total) by mouth once daily. (Patient taking differently: Take 10 mg by mouth daily as needed.) 90 tablet 3    multivitamin capsule Take 1 capsule by mouth once daily.      ondansetron (ZOFRAN) 4 MG tablet Take 4 mg by mouth as needed.      tiZANidine (ZANAFLEX) 2 MG tablet TAKE 1 TABLET BY MOUTH EVERY 8 HOURS AS NEEDED FOR MUSCLE SPASM (Patient taking differently: Take 2 mg by mouth every 8 (eight) hours as needed.) 90 tablet 1    vitamin D (VITAMIN D3) 1000 units Tab Take 1,000 Units by mouth once daily.         Allergies  Review of patient's allergies indicates:  No Known Allergies      Physical Examination     Vitals:    11/10/22 1043   BP: 130/80   Pulse: 64     Spine Musculoskeletal Exam    Gait    Gait is normal.    Inspection    Leg length disparity: no discrepancy    Thoracolumbar    Erythema: none    Swelling: none    Edema (right lower extremity): none    Edema (left lower extremity): none    Ecchymosis: none    Deformity: none    Strength    Thoracolumbar    Thoracolumbar motor exam is normal.       Sensory    Thoracolumbar    Thoracolumbar sensation is normal.    General      Constitutional: appears stated age, well-developed and well-nourished    Scleral icterus: no    Labored breathing: no    Psychiatric: normal mood and affect and no acute distress    Neurological: alert and oriented x3    Skin: intact     Assessment and Plan (including Health Maintenance)      Problem List  Smart Sets  Document Outside HM   :    Plan:   Lumbar  radiculopathy    Disc displacement, lumbar    Chronic back pain greater than 3 months duration    He is doing much better since his injection a couple of weeks ago. Will follow up in 3 months or sooner if needed.    Problem List Items Addressed This Visit          Orthopedic Problems    Disc displacement, lumbar       Other    Chronic back pain greater than 3 months duration    Lumbar radiculopathy - Primary         Future Appointments   Date Time Provider Department Center   11/28/2022  9:30 AM Wilton Welch MD [facility]   1/10/2023 10:00 AM Bronwyn Hdz MD [facility]        There are no Patient Instructions on file for this visit.  No follow-ups on file.     Signature:  Bronwyn Hdz MD      Date of encounter: 11/10/22

## 2022-11-10 NOTE — TELEPHONE ENCOUNTER
Ordered labs for pt's wellness  Unable to order PSA due to pt having this done in 06.2022  Please advise if pt needing PSA for this visit  Thanks

## 2022-11-23 ENCOUNTER — LAB VISIT (OUTPATIENT)
Dept: LAB | Facility: HOSPITAL | Age: 72
End: 2022-11-23
Attending: INTERNAL MEDICINE
Payer: MEDICARE

## 2022-11-23 DIAGNOSIS — E78.5 HYPERLIPIDEMIA, UNSPECIFIED HYPERLIPIDEMIA TYPE: ICD-10-CM

## 2022-11-23 DIAGNOSIS — R97.20 ELEVATED PROSTATE SPECIFIC ANTIGEN (PSA): ICD-10-CM

## 2022-11-23 DIAGNOSIS — Z00.00 WELLNESS EXAMINATION: ICD-10-CM

## 2022-11-23 DIAGNOSIS — R97.20 ELEVATED PSA: ICD-10-CM

## 2022-11-23 LAB
CREAT UR-MCNC: 190 MG/DL (ref 63–166)
MICROALBUMIN UR-MCNC: 8.9 UG/ML
MICROALBUMIN/CREAT RATIO PNL UR: 4.7 MG/GM CR (ref 0–30)

## 2022-11-23 PROCEDURE — 82043 UR ALBUMIN QUANTITATIVE: CPT

## 2022-11-28 ENCOUNTER — OFFICE VISIT (OUTPATIENT)
Dept: FAMILY MEDICINE | Facility: CLINIC | Age: 72
End: 2022-11-28
Payer: MEDICARE

## 2022-11-28 VITALS
DIASTOLIC BLOOD PRESSURE: 80 MMHG | OXYGEN SATURATION: 96 % | WEIGHT: 150.88 LBS | BODY MASS INDEX: 27.77 KG/M2 | TEMPERATURE: 99 F | HEIGHT: 62 IN | SYSTOLIC BLOOD PRESSURE: 122 MMHG | HEART RATE: 91 BPM | RESPIRATION RATE: 18 BRPM

## 2022-11-28 DIAGNOSIS — G89.29 CHRONIC MIDLINE LOW BACK PAIN WITHOUT SCIATICA: ICD-10-CM

## 2022-11-28 DIAGNOSIS — J44.9 MODERATE COPD (CHRONIC OBSTRUCTIVE PULMONARY DISEASE): ICD-10-CM

## 2022-11-28 DIAGNOSIS — M54.50 CHRONIC MIDLINE LOW BACK PAIN WITHOUT SCIATICA: ICD-10-CM

## 2022-11-28 DIAGNOSIS — E78.2 MIXED HYPERLIPIDEMIA: ICD-10-CM

## 2022-11-28 DIAGNOSIS — Z00.00 WELLNESS EXAMINATION: Primary | ICD-10-CM

## 2022-11-28 DIAGNOSIS — R97.20 ELEVATED PSA: ICD-10-CM

## 2022-11-28 DIAGNOSIS — L57.0 ACTINIC KERATOSIS: ICD-10-CM

## 2022-11-28 PROBLEM — J02.9 PHARYNGITIS: Status: RESOLVED | Noted: 2022-06-24 | Resolved: 2022-11-28

## 2022-11-28 PROCEDURE — G0439 PPPS, SUBSEQ VISIT: HCPCS | Mod: ,,, | Performed by: NURSE PRACTITIONER

## 2022-11-28 PROCEDURE — G0439 PR MEDICARE ANNUAL WELLNESS SUBSEQUENT VISIT: ICD-10-PCS | Mod: ,,, | Performed by: NURSE PRACTITIONER

## 2022-11-28 NOTE — ASSESSMENT & PLAN NOTE
Stable  Continue Symbicort and Albuterol inhaler as prescribed  He discontinued Trelegy due to costs; but states

## 2022-11-28 NOTE — ASSESSMENT & PLAN NOTE
Labs previously done and reviewed with patient  Pneumovax 23 up to date (2018)  Prevnar 13 up to date (2017)  Influenza up to date (9/2022  Covid-19 vaccine up to date (2021); booster due  [place] AAA screening up to date (2022); no AAA

## 2022-11-28 NOTE — PROGRESS NOTES
Patient ID: [id number]     Chief Complaint: Medicare AWV (With completed labs )      HPI:     Reji Dozier is a 72 y.o. male here today for a Medicare Wellness.     Labs previously done and reviewed with patient  Pneumovax 23 up to date (2018)  Prevnar 13 up to date (2017)  Influenza up to date (9/2022  Covid-19 vaccine up to date (2021); booster due  [place] AAA screening up to date (2022); normal      Opioid Screening: Patient medication list reviewed, patient is not taking prescription opioids. Patient is not using additional opioids than prescribed. Patient is at low risk of substance abuse based on this opioid use history.       ----------------------------  Actinic keratosis  Arthritis      Comment:  hip  Asthma  Chronic back pain  COPD (chronic obstructive pulmonary disease)  Dyslipidemia  Elevated PSA measurement  Lower extremity weakness  Lumbar radiculitis  Lumbar spinal stenosis  Mixed hyperlipidemia  Nocturia  Radicular low back pain  Skin lesion  Solar elastosis  Umbilical hernia     Past Surgical History:   Procedure Laterality Date    COLONOSCOPY      control bleeding      of gastrointestinal tract    ESOPHAGOGASTRODUODENOSCOPY      HEMORRHOID SURGERY      HERNIA REPAIR      INJECTION OF FACET JOINT Bilateral     SCLEROTHERAPY WITH ULTRASOUND GUIDANCE      TOTAL HIP ARTHROPLASTY Right     TOTAL HIP ARTHROPLASTY Left     TRANSFORAMINAL EPIDURAL INJECTION OF STEROID Bilateral 5/4/2022    Procedure: INJECTION, STEROID, EPIDURAL, TRANSFORAMINAL APPROACH L3;  Surgeon: Bronwyn Hdz MD;  Location: [facility] OR;  Service: Pain Management;  Laterality: Bilateral;    TRANSFORAMINAL EPIDURAL INJECTION OF STEROID Bilateral 9/14/2022    Procedure: Injection,steroid,epidural,transforaminal approach;  Surgeon: Bronwyn Hdz MD;  Location: [facility] OR;  Service: Pain Management;  Laterality: Bilateral;  L3    TRANSFORAMINAL EPIDURAL INJECTION OF STEROID Bilateral 10/26/2022    Procedure:  Injection,steroid,epidural,transforaminal approach;  Surgeon: Bronwyn Hdz MD;  Location: [facility] OR;  Service: Pain Management;  Laterality: Bilateral;  L3       Review of patient's allergies indicates:  No Known Allergies    Outpatient Medications Marked as Taking for the 11/28/22 encounter (Office Visit) with CARY Lerner   Medication Sig Dispense Refill    albuterol (PROVENTIL/VENTOLIN HFA) 90 mcg/actuation inhaler Inhale 6.7 g into the lungs as needed.      albuterol-ipratropium (DUO-NEB) 2.5 mg-0.5 mg/3 mL nebulizer solution Take 3 mLs by nebulization every 6 (six) hours as needed for Wheezing. Rescue 75 mL 6    ascorbic acid, vitamin C, 250 mg Chew Take 1 tablet by mouth once daily.      atorvastatin (LIPITOR) 10 MG tablet Take 10 mg by mouth every evening.      budesonide-formoterol 160-4.5 mcg (SYMBICORT) 160-4.5 mcg/actuation HFAA INHALE 2 PUFFS BY MOUTH INTO THE LUNGS EVERY 12 (TWELVE) HOURS. CONTROLLER 6 g 3    fluticasone propionate (FLONASE) 50 mcg/actuation nasal spray SPRAY 2 SPRAYS INTO EACH NOSTRIL TWICE A DAY (Patient taking differently: 2 sprays once daily. SPRAY 2 SPRAYS INTO EACH NOSTRIL) 48 mL 3    gabapentin (NEURONTIN) 100 MG capsule Take 1 capsule (100 mg total) by mouth once daily. 30 capsule 6    multivitamin capsule Take 1 capsule by mouth once daily.      ondansetron (ZOFRAN) 4 MG tablet Take 4 mg by mouth as needed.      tiZANidine (ZANAFLEX) 2 MG tablet TAKE 1 TABLET BY MOUTH EVERY 8 HOURS AS NEEDED FOR MUSCLE SPASM (Patient taking differently: Take 2 mg by mouth every 8 (eight) hours as needed.) 90 tablet 1    vitamin D (VITAMIN D3) 1000 units Tab Take 1,000 Units by mouth once daily.         Social History     Socioeconomic History    Marital status:    Tobacco Use    Smoking status: Former     Types: Cigarettes    Smokeless tobacco: Never   Substance and Sexual Activity    Alcohol use: Yes     Alcohol/week: 1.0 standard drink     Types: 1 Cans of beer per week      Comment: daily    Drug use: Never    Sexual activity: Yes        Family History   Family history unknown: Yes        Patient Care Team:  Wilton Welch MD as PCP - General (Internal Medicine)  Wilton Welch MD       Subjective:     Review of Systems   Constitutional: Negative.    HENT: Negative.     Eyes: Negative.    Respiratory: Negative.     Cardiovascular: Negative.    Gastrointestinal: Negative.    Genitourinary: Negative.    Musculoskeletal: Negative.    Skin: Negative.    Neurological: Negative.    Endo/Heme/Allergies: Negative.    Psychiatric/Behavioral: Negative.     All other systems reviewed and are negative.      Patient Reported Health Risk Assessment  [age]  Are you male or female?: Male  During the past four weeks, how much have you been bothered by emotional problems such as feeling anxious, depressed, irritable, sad, or downhearted and blue?: Not at all  During the past five weeks, has your physical and/or emotional health limited your social activities with family, friends, neighbors, or groups?: Not at all  During the past four weeks, how much bodily pain have you generally had?: Moderate pain  During the past four weeks, was someone available to help if you needed and wanted help?: Yes, as much as I wanted  During the past four weeks, what was the hardest physical activity you could do for at least two minutes?: Moderate  Can you get to places out of walking distance without help?  (For example, can you travel alone on buses or taxis, or drive your own car?): Yes  Can you go shopping for groceries or clothes without someone's help?: Yes  Can you prepare your own meals?: Yes  Can you do your own housework without help?: Yes  Because of any health problems, do you need the help of another person with your personal care needs such as eating, bathing, dressing, or getting around the house?: No  Can you handle your own money without help?: Yes  During the past four weeks, how  "would you rate your health in general?: Excellent  How have things been going for you during the past four weeks?: Pretty well  Are you having difficulties driving your car?: No  Do you always fasten your seat belt when you are in a car?: Yes, usually  How often in the past four weeks have you been bothered by falling or dizzy when standing up?: Never  How often in the past four weeks have you been bothered by sexual problems?: Never  How often in the past four weeks have you been bothered by trouble eating well?: Never  How often in the past four weeks have you been bothered by teeth or denture problems?: Never  How often in the past four weeks have you been bothered with problems using the telephone?: Never  How often in the past four weeks have you been bothered by tiredness or fatigue?: Never  Have you fallen two or more times in the past year?: No  Are you afraid of falling?: Yes  Are you a smoker?: No  During the past four weeks, how many drinks of wine, beer, or other alcoholic beverages did you have?: 2-5 drinks per weeks  Do you exercise for about 20 minutes three or more days a week?: Yes, some of the time    Objective:     /80 (BP Location: Right arm, Patient Position: Sitting, BP Method: Large (Automatic))   Pulse 91   Temp 98.5 °F (36.9 °C) (Temporal)   Resp 18   Ht 5' 2" (1.575 m)   Wt 68.4 kg (150 lb 14.4 oz)   SpO2 96%   BMI 27.60 kg/m²     Physical Exam  Vitals reviewed.   Constitutional:       Appearance: He is not toxic-appearing.   HENT:      Head: Normocephalic.      Mouth/Throat:      Mouth: Mucous membranes are moist.   Eyes:      Extraocular Movements: Extraocular movements intact.      Pupils: Pupils are equal, round, and reactive to light.   Cardiovascular:      Rate and Rhythm: Normal rate and regular rhythm.      Pulses: Normal pulses.      Heart sounds: Normal heart sounds.   Pulmonary:      Effort: Pulmonary effort is normal. No respiratory distress.      Breath sounds: " "Normal breath sounds.   Abdominal:      General: Bowel sounds are normal. There is no distension.      Palpations: Abdomen is soft.      Tenderness: There is no abdominal tenderness.   Musculoskeletal:         General: No tenderness. Normal range of motion.      Cervical back: Neck supple.   Skin:     General: Skin is warm and dry.   Neurological:      Mental Status: He is alert and oriented to person, place, and time.   Psychiatric:         Mood and Affect: Mood normal.       No flowsheet data found.  Fall Risk Assessment - Outpatient 11/10/2022 9/29/2022 8/26/2022 6/2/2022 5/26/2022 5/26/2022   Mobility Status Ambulatory Ambulatory Ambulatory Ambulatory Ambulatory Ambulatory   Number of falls 0 1 0 0 0 0   Identified as fall risk 0 0 0 0 0 0           Depression Screening  Over the past two weeks, has the patient felt down, depressed, or hopeless?: No  Over the past two weeks, has the patient felt little interest or pleasure in doing things?: No  Functional Ability/Safety Screening  Was the patient's timed Up & Go test unsteady or longer than 30 seconds?: No  Does the patient need help with phone, transportation, shopping, preparing meals, housework, laundry, meds, or managing money?: No  Does the patient's home have rugs in the hallway, lack grab bars in the bathroom, lack handrails on the stairs or have poor lighting?: No  Have you noticed any hearing difficulties?: No  A "Yes" response to any of the above questions should trigger further investigation.: no  Cognitive Function (Assessed through direct observation with due consideration of information obtained by way of patient reports and/or concerns raised by family, friends, caretakers, or others)    Does the patient repeat questions/statements in the same day?: No  Does the patient have trouble remembering the date, year, and time?: No  Does the patient have difficulty managing finances?: No  Does the patient have a decreased sense of direction?: " No  Assessment/Plan:     1. Wellness examination  Assessment & Plan:  Labs previously done and reviewed with patient  Pneumovax 23 up to date (2018)  Prevnar 13 up to date (2017)  Influenza up to date (9/2022  Covid-19 vaccine up to date (2021); booster due  [place] AAA screening up to date (2022); no AAA          2. Mixed hyperlipidemia  Assessment & Plan:  Stable  Continue Statin      3. Moderate COPD (chronic obstructive pulmonary disease)  Assessment & Plan:  Stable  Continue Symbicort and Albuterol inhaler as prescribed  He discontinued Trelegy due to costs; but states       4. Elevated PSA  Assessment & Plan:  Stable  Following Dr. Harris, Urologist      5. Chronic midline low back pain without sciatica  Assessment & Plan:  Stable      6. Actinic keratosis  Assessment & Plan:  Stable  Keep appt with Dr. Tomasz Wei for follow up           Medicare Annual Wellness and Personalized Prevention Plan:   Fall Risk + Home Safety + Hearing Impairment + Depression Screen + Opioid and Substance Abuse Screening + Cognitive Impairment Screen + Health Risk Assessment all reviewed.     Health Maintenance Topics with due status: Not Due       Topic Last Completion Date    Colorectal Cancer Screening 05/22/2018    Lipid Panel 11/23/2022      The patient's Health Maintenance was reviewed and the following appears to be due at this time:   Health Maintenance Due   Topic Date Due    Shingles Vaccine (1 of 2) Never done    COVID-19 Vaccine (4 - Booster for Moderna series) 02/01/2022       Advance Care Planning   I attest to discussing Advance Care Planning with patient and/or family member.  Education was provided including the importance of the Health Care Power of , Advance Directives, and/or [place] documentation.  The patient expressed understanding to the importance of this information and discussion.         Follow up for F/U with PCP. In addition to their scheduled follow up, the patient has also been instructed to  follow up on as needed basis.

## 2022-11-28 NOTE — PROGRESS NOTES
Subjective:      Patient ID: Reji Dozier is a 72 y.o. White male      Chief Complaint: Medicare AWV (With completed labs )       Past Medical History:   Diagnosis Date    Actinic keratosis     Arthritis     hip    Asthma     Chronic back pain     COPD (chronic obstructive pulmonary disease)     Dyslipidemia     Elevated PSA measurement     Lower extremity weakness     Lumbar radiculitis     Lumbar spinal stenosis     Mixed hyperlipidemia     Nocturia     Radicular low back pain     Skin lesion     Solar elastosis     Umbilical hernia         HPI  Presents to clinic for Annual Wellness.    Labs previously done and reviewed with patient  Pneumovax 23 up to date (2018)  Prevnar 13 up to date (2017)  Influenza up to date (9/2022  Covid-19 vaccine up to date (2021); booster due  [place] AAA screening up to date (2022); normal      Review of Systems       Objective:      Vitals:    11/28/22 0826   BP: 122/80   Pulse: 91   Resp: 18   Temp: 98.5 °F (36.9 °C)      Body mass index is 27.6 kg/m².     Physical Exam       Current Outpatient Medications:     albuterol (PROVENTIL/VENTOLIN HFA) 90 mcg/actuation inhaler, Inhale 6.7 g into the lungs as needed., Disp: , Rfl:     albuterol-ipratropium (DUO-NEB) 2.5 mg-0.5 mg/3 mL nebulizer solution, Take 3 mLs by nebulization every 6 (six) hours as needed for Wheezing. Rescue, Disp: 75 mL, Rfl: 6    ascorbic acid, vitamin C, 250 mg Chew, Take 1 tablet by mouth once daily., Disp: , Rfl:     atorvastatin (LIPITOR) 10 MG tablet, Take 10 mg by mouth every evening., Disp: , Rfl:     budesonide-formoterol 160-4.5 mcg (SYMBICORT) 160-4.5 mcg/actuation HFAA, INHALE 2 PUFFS BY MOUTH INTO THE LUNGS EVERY 12 (TWELVE) HOURS. CONTROLLER, Disp: 6 g, Rfl: 3    fluticasone propionate (FLONASE) 50 mcg/actuation nasal spray, SPRAY 2 SPRAYS INTO EACH NOSTRIL TWICE A DAY (Patient taking differently: 2 sprays once daily. SPRAY 2 SPRAYS INTO EACH NOSTRIL), Disp: 48 mL, Rfl: 3    gabapentin (NEURONTIN)  100 MG capsule, Take 1 capsule (100 mg total) by mouth once daily., Disp: 30 capsule, Rfl: 6    multivitamin capsule, Take 1 capsule by mouth once daily., Disp: , Rfl:     ondansetron (ZOFRAN) 4 MG tablet, Take 4 mg by mouth as needed., Disp: , Rfl:     tiZANidine (ZANAFLEX) 2 MG tablet, TAKE 1 TABLET BY MOUTH EVERY 8 HOURS AS NEEDED FOR MUSCLE SPASM (Patient taking differently: Take 2 mg by mouth every 8 (eight) hours as needed.), Disp: 90 tablet, Rfl: 1    vitamin D (VITAMIN D3) 1000 units Tab, Take 1,000 Units by mouth once daily., Disp: , Rfl:     Assessment & Plan:     Problem List Items Addressed This Visit          Pulmonary    Moderate COPD (chronic obstructive pulmonary disease)       Cardiac/Vascular    HLD (hyperlipidemia)       Renal/    Elevated PSA       Orthopedic    Chronic midline low back pain without sciatica       Other    Wellness examination - Primary            ***

## 2023-01-10 ENCOUNTER — OFFICE VISIT (OUTPATIENT)
Dept: PAIN MEDICINE | Facility: CLINIC | Age: 73
End: 2023-01-10
Payer: MEDICARE

## 2023-01-10 VITALS
DIASTOLIC BLOOD PRESSURE: 78 MMHG | HEIGHT: 62 IN | SYSTOLIC BLOOD PRESSURE: 129 MMHG | BODY MASS INDEX: 27.6 KG/M2 | HEART RATE: 84 BPM | WEIGHT: 150 LBS

## 2023-01-10 DIAGNOSIS — G89.29 CHRONIC BACK PAIN GREATER THAN 3 MONTHS DURATION: ICD-10-CM

## 2023-01-10 DIAGNOSIS — M51.26 DISC DISPLACEMENT, LUMBAR: Primary | ICD-10-CM

## 2023-01-10 DIAGNOSIS — M54.9 CHRONIC BACK PAIN GREATER THAN 3 MONTHS DURATION: ICD-10-CM

## 2023-01-10 DIAGNOSIS — M54.16 LUMBAR RADICULOPATHY: ICD-10-CM

## 2023-01-10 PROCEDURE — 99213 OFFICE O/P EST LOW 20 MIN: CPT | Mod: ,,, | Performed by: ANESTHESIOLOGY

## 2023-01-10 PROCEDURE — 99213 PR OFFICE/OUTPT VISIT, EST, LEVL III, 20-29 MIN: ICD-10-PCS | Mod: ,,, | Performed by: ANESTHESIOLOGY

## 2023-01-10 NOTE — PROGRESS NOTES
Bronwyn Hdz MD        PATIENT NAME: Reji Dozier  : 1950  DATE: 1/10/23  MRN: [id number]      Billing Provider: Bronwyn Hdz MD  Level of Service:   Patient PCP Information       Provider PCP Type    Wilton Welch MD General            Reason for Visit / Chief Complaint: Back Pain (2 month Follow up visit for back pain. Pain is a 8/10 on worse day. Taking prescription medications for pain. Wants to discuss possible injections./)       Update PCP  Update Chief Complaint         History of Present Illness / Problem Focused Workflow     Reji Dozier presents to the clinic with Back Pain (2 month Follow up visit for back pain. Pain is a 8/10 on worse day. Taking prescription medications for pain. Wants to discuss possible injections./)       This is a 72-year-old male who returns to clinic today for follow-up of his chronic low back pain.  He was last seen here back in November, at which time he was doing well since undergoing bilateral L3 transforaminal epidural steroid injections in October.  He returns today and states that he is starting to notice a little bit more pain coming back.  His pain level goes up and down throughout the day, depending on what he is doing.  He continues to play basketball.  He currently rates his pain as 3/10 on the NRS.  It gets up to 8/10 at worst.  He notices tightness when he wakes up in the morning.  He usually takes his medications around 11:00 a.m. so that it last for the rest of the day.  He started taking them daily, which he notices helps more than when he was just taking them as needed.          Back Pain    Follow-up      Review of Systems     Review of Systems   Musculoskeletal:  Positive for back pain.   All other systems reviewed and are negative.     Medical / Social / Family History     Past Medical History:   Diagnosis Date    Actinic keratosis     Arthritis     hip    Asthma     Chronic back pain     COPD (chronic obstructive pulmonary  disease)     Dyslipidemia     Elevated PSA measurement     Lower extremity weakness     Lumbar radiculitis     Lumbar spinal stenosis     Mixed hyperlipidemia     Nocturia     Radicular low back pain     Skin lesion     Solar elastosis     Umbilical hernia        Past Surgical History:   Procedure Laterality Date    COLONOSCOPY      control bleeding      of gastrointestinal tract    ESOPHAGOGASTRODUODENOSCOPY      HEMORRHOID SURGERY      HERNIA REPAIR      INJECTION OF FACET JOINT Bilateral     SCLEROTHERAPY WITH ULTRASOUND GUIDANCE      TOTAL HIP ARTHROPLASTY Right     TOTAL HIP ARTHROPLASTY Left     TRANSFORAMINAL EPIDURAL INJECTION OF STEROID Bilateral 5/4/2022    Procedure: INJECTION, STEROID, EPIDURAL, TRANSFORAMINAL APPROACH L3;  Surgeon: Bronwyn Hdz MD;  Location: [facility] OR;  Service: Pain Management;  Laterality: Bilateral;    TRANSFORAMINAL EPIDURAL INJECTION OF STEROID Bilateral 9/14/2022    Procedure: Injection,steroid,epidural,transforaminal approach;  Surgeon: Bronwyn Hdz MD;  Location: [facility] OR;  Service: Pain Management;  Laterality: Bilateral;  L3    TRANSFORAMINAL EPIDURAL INJECTION OF STEROID Bilateral 10/26/2022    Procedure: Injection,steroid,epidural,transforaminal approach;  Surgeon: Bronwyn Hdz MD;  Location: [facility] OR;  Service: Pain Management;  Laterality: Bilateral;  L3       Social History  Mr. Dozier  reports that he has quit smoking. His smoking use included cigarettes. He has never used smokeless tobacco. He reports current alcohol use of about 1.0 standard drink per week. He reports that he does not use drugs.    Family History  Mr.'s Dozier Family history is unknown by patient.    Medications and Allergies     Medications  Outpatient Medications Marked as Taking for the 1/10/23 encounter (Office Visit) with Bronwyn Hdz MD   Medication Sig Dispense Refill    albuterol (PROVENTIL/VENTOLIN HFA) 90 mcg/actuation inhaler Inhale 6.7 g into the lungs as needed.       ascorbic acid, vitamin C, 250 mg Chew Take 1 tablet by mouth once daily.      atorvastatin (LIPITOR) 10 MG tablet Take 10 mg by mouth every evening.      budesonide-formoterol 160-4.5 mcg (SYMBICORT) 160-4.5 mcg/actuation HFAA INHALE 2 PUFFS BY MOUTH INTO THE LUNGS EVERY 12 (TWELVE) HOURS. CONTROLLER 6 g 3    fluticasone propionate (FLONASE) 50 mcg/actuation nasal spray SPRAY 2 SPRAYS INTO EACH NOSTRIL TWICE A DAY (Patient taking differently: 2 sprays once daily. SPRAY 2 SPRAYS INTO EACH NOSTRIL) 48 mL 3    gabapentin (NEURONTIN) 100 MG capsule Take 1 capsule (100 mg total) by mouth once daily. 30 capsule 6    multivitamin capsule Take 1 capsule by mouth once daily.      tiZANidine (ZANAFLEX) 2 MG tablet TAKE 1 TABLET BY MOUTH EVERY 8 HOURS AS NEEDED FOR MUSCLE SPASM (Patient taking differently: Take 2 mg by mouth every 8 (eight) hours as needed.) 90 tablet 1    vitamin D (VITAMIN D3) 1000 units Tab Take 1,000 Units by mouth once daily.         Allergies  Review of patient's allergies indicates:  No Known Allergies      Physical Examination     Vitals:    01/10/23 0955   BP: 129/78   Pulse: 84     Spine Musculoskeletal Exam    Gait    Gait is normal.    Inspection    Leg length disparity: no discrepancy    Thoracolumbar    Erythema: none    Swelling: none    Edema (right lower extremity): none    Edema (left lower extremity): none    Ecchymosis: none    Deformity: none    Strength    Thoracolumbar    Thoracolumbar motor exam is normal.       Sensory    Thoracolumbar    Thoracolumbar sensation is normal.    General      Constitutional: appears stated age, well-developed and well-nourished    Scleral icterus: no    Labored breathing: no    Psychiatric: normal mood and affect and no acute distress    Neurological: alert and oriented x3    Skin: intact     Assessment and Plan (including Health Maintenance)      Problem List  Smart Sets  Document Outside HM   :    Plan:   Disc displacement, lumbar    Chronic back pain  greater than 3 months duration    Lumbar radiculopathy    I am scheduling him for bilateral L3 transforaminal epidural steroid injections again today.  He has been getting a few months of relief from these injections, but his pain is starting to gradually return now.  The plan was discussed with the patient and he wishes to proceed.    Problem List Items Addressed This Visit       Chronic back pain greater than 3 months duration    Disc displacement, lumbar - Primary    Lumbar radiculopathy         Future Appointments   Date Time Provider Department Center   5/29/2023  9:[facility]        There are no Patient Instructions on file for this visit.  No follow-ups on file.     Signature:  Bronwyn Hdz MD      Date of encounter: 1/10/23

## 2023-01-18 ENCOUNTER — TELEPHONE (OUTPATIENT)
Dept: FAMILY MEDICINE | Facility: CLINIC | Age: 73
End: 2023-01-18
Payer: MEDICARE

## 2023-01-18 NOTE — TELEPHONE ENCOUNTER
Please call patient- have him come see me on Monday at 11:45, ok to add on; I want to review his recent ED visit at [facility] and the CXR that was done with him

## 2023-01-19 ENCOUNTER — TELEPHONE (OUTPATIENT)
Dept: FAMILY MEDICINE | Facility: CLINIC | Age: 73
End: 2023-01-19
Payer: MEDICARE

## 2023-01-19 RX ORDER — HYDROCODONE BITARTRATE AND HOMATROPINE METHYLBROMIDE ORAL SOLUTION 5; 1.5 MG/5ML; MG/5ML
5 LIQUID ORAL EVERY 6 HOURS PRN
Qty: 120 ML | Refills: 0 | Status: SHIPPED | OUTPATIENT
Start: 2023-01-19 | End: 2023-01-19 | Stop reason: SDUPTHER

## 2023-01-19 RX ORDER — HYDROCODONE BITARTRATE AND HOMATROPINE METHYLBROMIDE ORAL SOLUTION 5; 1.5 MG/5ML; MG/5ML
5 LIQUID ORAL EVERY 6 HOURS PRN
Qty: 120 ML | Refills: 0 | Status: SHIPPED | OUTPATIENT
Start: 2023-01-19 | End: 2023-01-23 | Stop reason: SDUPTHER

## 2023-01-19 NOTE — TELEPHONE ENCOUNTER
I have signed for the following orders AND/OR meds. Please notify the patient and ask the patient to schedule the testing and/or information about any medications that were sent.      Rx resent to [facility] in [facility] per notes reviewed    Medications Ordered This Encounter   Medications    hydrocodone-homatropine 5-1.5 mg/5 ml (HYCODAN) 5-1.5 mg/5 mL Syrp     Sig: Take 5 mLs by mouth every 6 (six) hours as needed (cough).     Dispense:  120 mL     Refill:  0     Quantity prescribed more than 7 day supply? Yes, quantity medically necessary     Order Specific Question:   I have reviewed the Prescription Drug Monitoring Program (PDMP) database for this patient prior to prescribing the above opioid medication     Answer:   Yes     No orders of the defined types were placed in this encounter.

## 2023-01-23 ENCOUNTER — OFFICE VISIT (OUTPATIENT)
Dept: FAMILY MEDICINE | Facility: CLINIC | Age: 73
End: 2023-01-23
Payer: MEDICARE

## 2023-01-23 ENCOUNTER — TELEPHONE (OUTPATIENT)
Dept: PAIN MEDICINE | Facility: CLINIC | Age: 73
End: 2023-01-23
Payer: MEDICARE

## 2023-01-23 VITALS
HEIGHT: 62 IN | BODY MASS INDEX: 28.21 KG/M2 | WEIGHT: 153.31 LBS | SYSTOLIC BLOOD PRESSURE: 130 MMHG | RESPIRATION RATE: 18 BRPM | DIASTOLIC BLOOD PRESSURE: 72 MMHG | HEART RATE: 66 BPM | TEMPERATURE: 99 F | OXYGEN SATURATION: 97 %

## 2023-01-23 DIAGNOSIS — F17.210 NICOTINE DEPENDENCE, CIGARETTES, UNCOMPLICATED: ICD-10-CM

## 2023-01-23 DIAGNOSIS — J44.9 CHRONIC OBSTRUCTIVE PULMONARY DISEASE, UNSPECIFIED COPD TYPE: ICD-10-CM

## 2023-01-23 DIAGNOSIS — E78.2 MIXED HYPERLIPIDEMIA: ICD-10-CM

## 2023-01-23 DIAGNOSIS — J44.1 COPD WITH ACUTE EXACERBATION: Primary | ICD-10-CM

## 2023-01-23 PROBLEM — Z00.00 WELLNESS EXAMINATION: Status: RESOLVED | Noted: 2022-11-28 | Resolved: 2023-01-23

## 2023-01-23 PROCEDURE — 99214 PR OFFICE/OUTPT VISIT, EST, LEVL IV, 30-39 MIN: ICD-10-PCS | Mod: ,,, | Performed by: INTERNAL MEDICINE

## 2023-01-23 PROCEDURE — 99214 OFFICE O/P EST MOD 30 MIN: CPT | Mod: ,,, | Performed by: INTERNAL MEDICINE

## 2023-01-23 RX ORDER — PREDNISONE 10 MG/1
TABLET ORAL
Qty: 53 TABLET | Refills: 0 | Status: SHIPPED | OUTPATIENT
Start: 2023-01-23 | End: 2023-02-02 | Stop reason: SDUPTHER

## 2023-01-23 RX ORDER — HYDROCODONE BITARTRATE AND HOMATROPINE METHYLBROMIDE ORAL SOLUTION 5; 1.5 MG/5ML; MG/5ML
5 LIQUID ORAL EVERY 6 HOURS PRN
Qty: 120 ML | Refills: 0 | Status: SHIPPED | OUTPATIENT
Start: 2023-01-23 | End: 2023-01-24 | Stop reason: SDUPTHER

## 2023-01-23 RX ORDER — HYDROCODONE BITARTRATE AND HOMATROPINE METHYLBROMIDE ORAL SOLUTION 5; 1.5 MG/5ML; MG/5ML
5 LIQUID ORAL EVERY 6 HOURS PRN
Qty: 120 ML | Refills: 0 | Status: CANCELLED | OUTPATIENT
Start: 2023-01-23

## 2023-01-23 RX ORDER — FLUTICASONE FUROATE, UMECLIDINIUM BROMIDE AND VILANTEROL TRIFENATATE 200; 62.5; 25 UG/1; UG/1; UG/1
1 POWDER RESPIRATORY (INHALATION) DAILY
Qty: 60 EACH | Refills: 11 | Status: SHIPPED | OUTPATIENT
Start: 2023-01-23 | End: 2023-08-28 | Stop reason: SDUPTHER

## 2023-01-23 RX ORDER — IPRATROPIUM BROMIDE 0.5 MG/2.5ML
500 SOLUTION RESPIRATORY (INHALATION) 3 TIMES DAILY
Qty: 90 ML | Refills: 3 | Status: SHIPPED | OUTPATIENT
Start: 2023-01-23

## 2023-01-23 RX ORDER — IPRATROPIUM BROMIDE AND ALBUTEROL SULFATE 2.5; .5 MG/3ML; MG/3ML
3 SOLUTION RESPIRATORY (INHALATION) EVERY 6 HOURS PRN
Qty: 75 ML | Refills: 6 | Status: SHIPPED | OUTPATIENT
Start: 2023-01-23 | End: 2023-01-23

## 2023-01-23 RX ORDER — LEVOFLOXACIN 750 MG/1
750 TABLET ORAL DAILY
Qty: 10 TABLET | Refills: 0 | Status: SHIPPED | OUTPATIENT
Start: 2023-01-23 | End: 2023-02-24

## 2023-01-23 NOTE — TELEPHONE ENCOUNTER
Needs to be sent to [facility]  Only [facility] that has that med in stock  Thanks

## 2023-01-23 NOTE — PROGRESS NOTES
"Subjective:      Patient ID: Reji Dozier is a 72 y.o. male.    Chief Complaint: Follow-up    HPI  ED follow up for cough and abnormal CXR   Patient has had numerous ED visits for cough, wheezing  Says that he has not improved at all over last 1 year  Says that he is not taking trelegy because it was too expensive  Taking symbicort maybe 1x a day, not always  Last week treated for URI with prednisone 40 mg po daily x 5 days, then Azithromcyin, Says he feels SOB all over, very uncomfortable. Feels SOB walking in grocery store. Says that cough syrup helps him relax but not enough.     COPD: see HPI    HLD: compliant with statin, no myalgias    Lower back pain and RLE radiculopathy:  S/p FABY with Dr. Hdz    flu shot: due at local pharmacy 2022  COVID 19 vaccine: x 3 doses  Pneumovax and Prevnar completed    Health Maintenance Due   Topic Date Due    Shingles Vaccine (1 of 2) Never done    COVID-19 Vaccine (4 - Booster for Moderna series) 02/01/2022     Review of Systems   Respiratory:  Positive for cough, chest tightness, shortness of breath and wheezing.    A comprehensive ROS was performed and is negative except as noted above.  Objective:     Vitals:    01/23/23 1151   BP: 130/72   BP Location: Left arm   Patient Position: Sitting   BP Method: Large (Automatic)   Pulse: 66   Resp: 18   Temp: 98.7 °F (37.1 °C)   TempSrc: Temporal   SpO2: 97%   Weight: 69.5 kg (153 lb 4.8 oz)   Height: 5' 2" (1.575 m)     Physical Exam  Vitals and nursing note reviewed.   Constitutional:       General: He is in acute distress.   Cardiovascular:      Rate and Rhythm: Normal rate and regular rhythm.      Heart sounds: No murmur heard.  Pulmonary:      Breath sounds: Wheezing present.   Neurological:      Mental Status: He is alert and oriented to person, place, and time.   Psychiatric:         Behavior: Behavior normal.     Assessment/Plan:     1. COPD with acute exacerbation  -     fluticasone-umeclidin-vilanter (TRELEGY " ELLIPTA) 200-62.5-25 mcg inhaler; Inhale 1 puff into the lungs once daily.  Dispense: 60 each; Refill: 11  Nebulizer treatment in office  6 minute walk test now  Start 4 week course taper of prednisone, 40 mg, 20 mg, 10 mg, 5 mg  Start Levaquin 750 mg po daily x 10 days  Nebulizer treatment up to 3 times daily at home  Will determine needs for oxygen in office today- o2 sat on room air maintained at 92% or above with ambulation on 6 minute walk test, no indication for oxygen therapy today  D/C symbicort  Start Trelegy inhaler daily- 2 samples provided in office today  2. Nicotine dependence, cigarettes, uncomplicated  -     CT Chest Lung Screening Low Dose; Future; Expected date: 01/23/2023    3. Mixed hyperlipidemia  Stable, continue statin  Other orders  -     predniSONE (DELTASONE) 10 MG tablet; Take four tablets by mouth daily for 7 days. Then take two tablets by mouth daily for 7 days. Then take one tablet by mouth daily for 7 days. Then take half tablet by mouth daily for 7 days.  Dispense: 53 tablet; Refill: 0  -     levoFLOXacin (LEVAQUIN) 750 MG tablet; Take 1 tablet (750 mg total) by mouth once daily.  Dispense: 10 tablet; Refill: 0       Follow up in about 4 weeks (around 2/20/2023) for f/u COPD.    This includes face to face time and non-face to face time preparing to see the patient (eg, review of tests), obtaining and/or reviewing separately obtained history, documenting clinical information in the electronic or other health record, independently interpreting results and communicating results to the patient/family/caregiver, or care coordinator.

## 2023-01-24 ENCOUNTER — TELEPHONE (OUTPATIENT)
Dept: PREADMISSION TESTING | Facility: HOSPITAL | Age: 73
End: 2023-01-24
Payer: MEDICARE

## 2023-01-24 RX ORDER — BENZONATATE 200 MG/1
200 CAPSULE ORAL 3 TIMES DAILY PRN
Qty: 30 CAPSULE | Refills: 6 | Status: SHIPPED | OUTPATIENT
Start: 2023-01-24 | End: 2023-02-03

## 2023-01-24 RX ORDER — HYDROCODONE BITARTRATE AND HOMATROPINE METHYLBROMIDE ORAL SOLUTION 5; 1.5 MG/5ML; MG/5ML
5 LIQUID ORAL EVERY 6 HOURS PRN
Qty: 120 ML | Refills: 0 | Status: SHIPPED | OUTPATIENT
Start: 2023-01-24 | End: 2023-02-24

## 2023-01-24 NOTE — TELEPHONE ENCOUNTER
Mr. Reji Dozier just called me back.  He sated that he has to postpone this procedure until further notice.  He is still on our Surgery schedule.  Could someone please notify surgery of this change?  Thanks

## 2023-01-31 RX ORDER — ATORVASTATIN CALCIUM 10 MG/1
10 TABLET, FILM COATED ORAL NIGHTLY
Qty: 90 TABLET | Refills: 3 | Status: SHIPPED | OUTPATIENT
Start: 2023-01-31 | End: 2023-05-16 | Stop reason: SDUPTHER

## 2023-02-02 RX ORDER — PREDNISONE 10 MG/1
TABLET ORAL
Qty: 15 TABLET | Refills: 0 | Status: SHIPPED | OUTPATIENT
Start: 2023-02-02 | End: 2023-02-02

## 2023-02-09 ENCOUNTER — TELEPHONE (OUTPATIENT)
Dept: PAIN MEDICINE | Facility: CLINIC | Age: 73
End: 2023-02-09
Payer: MEDICARE

## 2023-02-13 ENCOUNTER — HOSPITAL ENCOUNTER (OUTPATIENT)
Dept: RADIOLOGY | Facility: HOSPITAL | Age: 73
Discharge: HOME | End: 2023-02-13
Attending: INTERNAL MEDICINE
Payer: MEDICARE

## 2023-02-13 DIAGNOSIS — F17.210 NICOTINE DEPENDENCE, CIGARETTES, UNCOMPLICATED: ICD-10-CM

## 2023-02-13 PROCEDURE — 71271 CT THORAX LUNG CANCER SCR C-: CPT | Mod: TC

## 2023-02-24 ENCOUNTER — OFFICE VISIT (OUTPATIENT)
Dept: FAMILY MEDICINE | Facility: CLINIC | Age: 73
End: 2023-02-24
Payer: MEDICARE

## 2023-02-24 VITALS
DIASTOLIC BLOOD PRESSURE: 82 MMHG | WEIGHT: 154.81 LBS | TEMPERATURE: 98 F | SYSTOLIC BLOOD PRESSURE: 136 MMHG | BODY MASS INDEX: 28.49 KG/M2 | RESPIRATION RATE: 18 BRPM | HEIGHT: 62 IN | OXYGEN SATURATION: 97 % | HEART RATE: 64 BPM

## 2023-02-24 DIAGNOSIS — J44.9 MODERATE COPD (CHRONIC OBSTRUCTIVE PULMONARY DISEASE): Primary | ICD-10-CM

## 2023-02-24 DIAGNOSIS — E78.2 MIXED HYPERLIPIDEMIA: ICD-10-CM

## 2023-02-24 DIAGNOSIS — R97.20 ELEVATED PSA: ICD-10-CM

## 2023-02-24 DIAGNOSIS — Z12.5 ENCOUNTER FOR SCREENING FOR MALIGNANT NEOPLASM OF PROSTATE: ICD-10-CM

## 2023-02-24 PROCEDURE — 99214 PR OFFICE/OUTPT VISIT, EST, LEVL IV, 30-39 MIN: ICD-10-PCS | Mod: ,,, | Performed by: INTERNAL MEDICINE

## 2023-02-24 PROCEDURE — 99214 OFFICE O/P EST MOD 30 MIN: CPT | Mod: ,,, | Performed by: INTERNAL MEDICINE

## 2023-02-24 NOTE — PROGRESS NOTES
"Subjective:      Patient ID: Reji Dozier is a 72 y.o. male.    Chief Complaint: Follow-up and COPD    HPI  Patient is here today for COPD follow up.  Sx have improved tremendously- no wheezing, no coughing spells, more energy, he is now exercising again, feels good. Has gained some weight. Did stop her prednisone about 1 week ago, sleeping better now.   Health Maintenance Due   Topic Date Due    Shingles Vaccine (1 of 2) Never done    COVID-19 Vaccine (4 - Booster for Moderna series) 02/01/2022     Review of Systems   Constitutional:  Negative for chills, fatigue and fever.   HENT:  Negative for congestion, ear pain, postnasal drip, rhinorrhea, sinus pressure and sore throat.    Eyes:  Negative for itching and visual disturbance.   Respiratory:  Negative for cough, shortness of breath and wheezing.    Cardiovascular:  Negative for chest pain, palpitations and leg swelling.   Gastrointestinal:  Negative for abdominal pain and nausea.   Genitourinary:  Negative for dysuria.   Musculoskeletal:  Positive for back pain. Negative for myalgias.   Skin:  Negative for rash.   Neurological:  Negative for weakness, light-headedness and headaches.   A comprehensive ROS was performed and is negative except as noted above.  Objective:     Vitals:    02/24/23 1116   BP: 136/82   BP Location: Right arm   Patient Position: Sitting   BP Method: Medium (Automatic)   Pulse: 64   Resp: 18   Temp: 98.3 °F (36.8 °C)   TempSrc: Oral   SpO2: 97%   Weight: 70.2 kg (154 lb 12.8 oz)   Height: 5' 2" (1.575 m)     Physical Exam  Vitals and nursing note reviewed.   Constitutional:       General: He is not in acute distress.     Appearance: Normal appearance. He is not ill-appearing.   HENT:      Head: Normocephalic and atraumatic.   Cardiovascular:      Rate and Rhythm: Normal rate and regular rhythm.      Heart sounds: No murmur heard.  Pulmonary:      Effort: Pulmonary effort is normal.      Breath sounds: Normal breath sounds. No " wheezing.   Musculoskeletal:      Right lower leg: No edema.      Left lower leg: No edema.   Neurological:      Mental Status: He is alert and oriented to person, place, and time.   Psychiatric:         Behavior: Behavior normal.     Assessment/Plan:     1. Moderate COPD (chronic obstructive pulmonary disease)  -     CBC Auto Differential; Future; Expected date: 04/24/2023  -     Comprehensive Metabolic Panel; Future; Expected date: 04/24/2023  -     Lipid Panel; Future; Expected date: 04/24/2023  -     Urinalysis, Reflex to Urine Culture; Future; Expected date: 04/24/2023  Stable, continue trelegy  Trial of antihistamine prior to any yard work  2. Mixed hyperlipidemia  -     CBC Auto Differential; Future; Expected date: 04/24/2023  -     Comprehensive Metabolic Panel; Future; Expected date: 04/24/2023  -     Lipid Panel; Future; Expected date: 04/24/2023  -     Urinalysis, Reflex to Urine Culture; Future; Expected date: 04/24/2023  Stable, continue statin therapy  3. Elevated PSA  -     CBC Auto Differential; Future; Expected date: 04/24/2023  -     Comprehensive Metabolic Panel; Future; Expected date: 04/24/2023  -     Lipid Panel; Future; Expected date: 04/24/2023  -     Urinalysis, Reflex to Urine Culture; Future; Expected date: 04/24/2023  Unable to repeat PSA until 11/2023  4. Encounter for screening for malignant neoplasm of prostate    Next appt is 6 month f/u in May 2023  No follow-ups on file.    I spent a total of 20 minutes on the day of the visit.This includes face to face time and non-face to face time preparing to see the patient (eg, review of tests), obtaining and/or reviewing separately obtained history, documenting clinical information in the electronic or other health record, independently interpreting results and communicating results to the patient/family/caregiver, or care coordinator.

## 2023-02-27 DIAGNOSIS — M54.16 LUMBAR RADICULOPATHY: ICD-10-CM

## 2023-02-27 DIAGNOSIS — G89.29 CHRONIC BACK PAIN GREATER THAN 3 MONTHS DURATION: ICD-10-CM

## 2023-02-27 DIAGNOSIS — M51.26 DISC DISPLACEMENT, LUMBAR: Primary | ICD-10-CM

## 2023-02-27 DIAGNOSIS — M54.9 CHRONIC BACK PAIN GREATER THAN 3 MONTHS DURATION: ICD-10-CM

## 2023-03-08 RX ORDER — ACETAMINOPHEN 500 MG
1000 TABLET ORAL EVERY 6 HOURS PRN
COMMUNITY
End: 2024-01-07

## 2023-03-14 ENCOUNTER — ANESTHESIA EVENT (OUTPATIENT)
Dept: SURGERY | Facility: HOSPITAL | Age: 73
End: 2023-03-14
Payer: MEDICARE

## 2023-03-14 RX ORDER — ONDANSETRON 2 MG/ML
4 INJECTION INTRAMUSCULAR; INTRAVENOUS DAILY PRN
Status: CANCELLED | OUTPATIENT
Start: 2023-03-14

## 2023-03-15 ENCOUNTER — HOSPITAL ENCOUNTER (OUTPATIENT)
Facility: HOSPITAL | Age: 73
Discharge: HOME | End: 2023-03-15
Attending: ANESTHESIOLOGY | Admitting: ANESTHESIOLOGY
Payer: MEDICARE

## 2023-03-15 ENCOUNTER — ANESTHESIA (OUTPATIENT)
Dept: SURGERY | Facility: HOSPITAL | Age: 73
End: 2023-03-15
Payer: MEDICARE

## 2023-03-15 VITALS
SYSTOLIC BLOOD PRESSURE: 112 MMHG | OXYGEN SATURATION: 96 % | TEMPERATURE: 97 F | BODY MASS INDEX: 28.11 KG/M2 | HEIGHT: 62 IN | WEIGHT: 152.75 LBS | RESPIRATION RATE: 17 BRPM | DIASTOLIC BLOOD PRESSURE: 65 MMHG | HEART RATE: 84 BPM

## 2023-03-15 DIAGNOSIS — G89.29 CHRONIC BACK PAIN GREATER THAN 3 MONTHS DURATION: Primary | ICD-10-CM

## 2023-03-15 DIAGNOSIS — M54.9 CHRONIC BACK PAIN GREATER THAN 3 MONTHS DURATION: Primary | ICD-10-CM

## 2023-03-15 PROCEDURE — 25000003 PHARM REV CODE 250: Performed by: ANESTHESIOLOGY

## 2023-03-15 PROCEDURE — 25000003 PHARM REV CODE 250

## 2023-03-15 PROCEDURE — 63600175 PHARM REV CODE 636 W HCPCS: Performed by: ANESTHESIOLOGY

## 2023-03-15 PROCEDURE — 64483 PR EPIDURAL INJ, ANES/STEROID, TRANSFORAMINAL, LUMB/SACR, SNGL LEVL: ICD-10-PCS | Mod: 50,,, | Performed by: ANESTHESIOLOGY

## 2023-03-15 PROCEDURE — 63600175 PHARM REV CODE 636 W HCPCS

## 2023-03-15 PROCEDURE — 64483 NJX AA&/STRD TFRM EPI L/S 1: CPT | Mod: 50 | Performed by: ANESTHESIOLOGY

## 2023-03-15 PROCEDURE — 64483 NJX AA&/STRD TFRM EPI L/S 1: CPT | Mod: 50,,, | Performed by: ANESTHESIOLOGY

## 2023-03-15 PROCEDURE — 37000008 HC ANESTHESIA 1ST 15 MINUTES: Performed by: ANESTHESIOLOGY

## 2023-03-15 RX ORDER — PROPOFOL 10 MG/ML
VIAL (ML) INTRAVENOUS
Status: DISCONTINUED | OUTPATIENT
Start: 2023-03-15 | End: 2023-03-15

## 2023-03-15 RX ORDER — BUPIVACAINE HYDROCHLORIDE 2.5 MG/ML
INJECTION, SOLUTION EPIDURAL; INFILTRATION; INTRACAUDAL
Status: DISCONTINUED | OUTPATIENT
Start: 2023-03-15 | End: 2023-03-15 | Stop reason: HOSPADM

## 2023-03-15 RX ORDER — LIDOCAINE HYDROCHLORIDE 10 MG/ML
INJECTION, SOLUTION EPIDURAL; INFILTRATION; INTRACAUDAL; PERINEURAL
Status: DISCONTINUED | OUTPATIENT
Start: 2023-03-15 | End: 2023-03-15 | Stop reason: HOSPADM

## 2023-03-15 RX ORDER — DEXAMETHASONE SODIUM PHOSPHATE 10 MG/ML
INJECTION INTRAMUSCULAR; INTRAVENOUS
Status: DISCONTINUED | OUTPATIENT
Start: 2023-03-15 | End: 2023-03-15 | Stop reason: HOSPADM

## 2023-03-15 RX ORDER — IOPAMIDOL 408 MG/ML
3 INJECTION, SOLUTION INTRATHECAL
Status: CANCELLED | OUTPATIENT
Start: 2023-03-15

## 2023-03-15 RX ORDER — SODIUM CHLORIDE 9 MG/ML
INJECTION, SOLUTION INTRAVENOUS CONTINUOUS
Status: ACTIVE | OUTPATIENT
Start: 2023-03-15

## 2023-03-15 RX ORDER — BUPIVACAINE HYDROCHLORIDE 2.5 MG/ML
INJECTION, SOLUTION EPIDURAL; INFILTRATION; INTRACAUDAL
Status: DISCONTINUED
Start: 2023-03-15 | End: 2023-03-15 | Stop reason: HOSPADM

## 2023-03-15 RX ORDER — LIDOCAINE HYDROCHLORIDE 20 MG/ML
INJECTION INTRAVENOUS
Status: DISCONTINUED | OUTPATIENT
Start: 2023-03-15 | End: 2023-03-15

## 2023-03-15 RX ORDER — LIDOCAINE HYDROCHLORIDE 10 MG/ML
INJECTION, SOLUTION EPIDURAL; INFILTRATION; INTRACAUDAL; PERINEURAL
Status: DISCONTINUED
Start: 2023-03-15 | End: 2023-03-15 | Stop reason: HOSPADM

## 2023-03-15 RX ORDER — SODIUM CHLORIDE, SODIUM LACTATE, POTASSIUM CHLORIDE, CALCIUM CHLORIDE 600; 310; 30; 20 MG/100ML; MG/100ML; MG/100ML; MG/100ML
INJECTION, SOLUTION INTRAVENOUS CONTINUOUS
Status: ACTIVE | OUTPATIENT
Start: 2023-03-15

## 2023-03-15 RX ORDER — DEXAMETHASONE SODIUM PHOSPHATE 10 MG/ML
INJECTION INTRAMUSCULAR; INTRAVENOUS
Status: DISCONTINUED
Start: 2023-03-15 | End: 2023-03-15 | Stop reason: HOSPADM

## 2023-03-15 RX ORDER — SODIUM CHLORIDE, SODIUM GLUCONATE, SODIUM ACETATE, POTASSIUM CHLORIDE AND MAGNESIUM CHLORIDE 30; 37; 368; 526; 502 MG/100ML; MG/100ML; MG/100ML; MG/100ML; MG/100ML
INJECTION, SOLUTION INTRAVENOUS CONTINUOUS
Status: ACTIVE | OUTPATIENT
Start: 2023-03-15 | End: 2023-04-14

## 2023-03-15 RX ADMIN — LIDOCAINE HYDROCHLORIDE 50 MG: 20 INJECTION INTRAVENOUS at 07:03

## 2023-03-15 RX ADMIN — PROPOFOL 100 MG: 10 INJECTION, EMULSION INTRAVENOUS at 07:03

## 2023-03-15 NOTE — OP NOTE
Procedure:    Left L3  transforaminal epidural steroid injection    Right L3  transforaminal epidural steroid injection    Pre-Procedure Diagnoses:  Chronic back pain greater than 3 months  Lumbar degenerative disc disease  Lumbar radiculopathy  Lumbar disc displacement    Post-Procedure Diagnoses:  Chronic back pain greater than 3 months  Lumbar degenerative disc disease  Lumbar radiculopathy  Lumbar disc displacement    Anesthesia:  Local and MAC    Estimated Blood Loss:  < 2 ML    Consent:  The procedure, risks, benefits, and alternatives were discussed with the patient.  The patient voiced understanding and fully informed written consent was obtained.    Description of the Procedure:  The patient was taken to the operating room and placed in the prone position. The skin overlying the lumbar spine was prepped with Chloraprep and draped in the usual sterile fashion.  An oblique fluoroscopic view was obtained on the left side at L3, with the superior articular process of the inferior vertebral body aligned with the pedicle.  Skin anesthesia was achieved using 2 mL of lidocaine 1%.  A 22-gauge 3.5-inch Quinke spinal needle was inserted and advanced under intermittent fluoroscopic views into the epidural space. Proper needle position was confirmed under AP, oblique, and lateral fluoroscopic views.  Negative aspiration for blood or CSF was confirmed. 1 mL of contrast was injected, which revealed spread into the epidural space.  Then a combination of 5 mg of dexamethasone with 1 mL of 0.25% bupivacaine was easily injected.   There was no pain on injection. The needle was removed intact and bleeding was nil.  The same procedure was repeated in identical fashion on the right side at L3.  Sterile bandages were applied. The patient was taken to the recovery room for further observation in stable condition. The patient was then discharged home with no complications.

## 2023-03-15 NOTE — ANESTHESIA POSTPROCEDURE EVALUATION
Anesthesia Post Evaluation    Patient: Reji Dozier    Procedure(s) Performed: Procedure(s) (LRB):  Injection,steroid,epidural,transforaminal approach (Bilateral)    Final Anesthesia Type: general      Patient location during evaluation: PACU  Patient participation: Yes- Able to Participate  Level of consciousness: awake  Post-procedure vital signs: reviewed and stable  Pain management: adequate  Airway patency: patent    PONV status at discharge: vomiting (controlled) and nausea (controlled)  Anesthetic complications: no      Cardiovascular status: hemodynamically stable  Respiratory status: spontaneous ventilation and unassisted  Hydration status: euvolemic  Follow-up not needed.  Comments:              Vitals Value Taken Time   /65 03/15/23 0835   Temp ** 03/15/23 0956   Pulse 84 03/15/23 0835   Resp 17 03/15/23 0835   SpO2 96 % 03/15/23 0835         No case tracking events are documented in the log.      Pain/Tucker Score: Modified Tucker Score: 20 (3/15/2023  8:36 AM)

## 2023-03-15 NOTE — TRANSFER OF CARE
"Anesthesia Transfer of Care Note    Patient: Reji Dozier    Procedure(s) Performed: Procedure(s) (LRB):  Injection,steroid,epidural,transforaminal approach (Bilateral)    Patient location: OPS    Anesthesia Type: MAC    Transport from OR: Transported from OR on room air with adequate spontaneous ventilation    Post pain: adequate analgesia    Post assessment: no apparent anesthetic complications    Post vital signs: stable    Level of consciousness: awake    Nausea/Vomiting: no nausea/vomiting    Complications: none    Transfer of care protocol was followed      Last vitals:   Visit Vitals  /76   Pulse 74   Temp 36.3 °C (97.3 °F) (Tympanic)   Resp 18   Ht 5' 2" (1.575 m)   Wt 69.3 kg (152 lb 12.5 oz)   SpO2 95%   BMI 27.94 kg/m²     "

## 2023-03-15 NOTE — DISCHARGE SUMMARY
[facility] - Periop Services  Discharge Note  Short Stay    Procedure(s) (LRB):  Injection,steroid,epidural,transforaminal approach (Bilateral)      OUTCOME: Patient tolerated treatment/procedure well without complication and is now ready for discharge.    DISPOSITION: Home or Self Care    FINAL DIAGNOSIS:  <principal problem not specified>    FOLLOWUP: In clinic    DISCHARGE INSTRUCTIONS:  No discharge procedures on file.     TIME SPENT ON DISCHARGE: 5 minutes

## 2023-03-15 NOTE — H&P
History of Present Illness / Problem Focused Workflow      Reji Dozier presents to the clinic with Back Pain (2 month Follow up visit for back pain. Pain is a 8/10 on worse day. Taking prescription medications for pain. Wants to discuss possible injections./)        This is a 72-year-old male who returns to clinic today for follow-up of his chronic low back pain.  He was last seen here back in November, at which time he was doing well since undergoing bilateral L3 transforaminal epidural steroid injections in October.  He returns today and states that he is starting to notice a little bit more pain coming back.  His pain level goes up and down throughout the day, depending on what he is doing.  He continues to play basketball.  He currently rates his pain as 3/10 on the NRS.  It gets up to 8/10 at worst.  He notices tightness when he wakes up in the morning.  He usually takes his medications around 11:00 a.m. so that it last for the rest of the day.  He started taking them daily, which he notices helps more than when he was just taking them as needed.              Back Pain     Follow-up        Review of Systems      Review of Systems   Musculoskeletal:  Positive for back pain.   All other systems reviewed and are negative.      Medical / Social / Family History           Past Medical History:   Diagnosis Date    Actinic keratosis      Arthritis       hip    Asthma      Chronic back pain      COPD (chronic obstructive pulmonary disease)      Dyslipidemia      Elevated PSA measurement      Lower extremity weakness      Lumbar radiculitis      Lumbar spinal stenosis      Mixed hyperlipidemia      Nocturia      Radicular low back pain      Skin lesion      Solar elastosis      Umbilical hernia                 Past Surgical History:   Procedure Laterality Date    COLONOSCOPY        control bleeding         of gastrointestinal tract    ESOPHAGOGASTRODUODENOSCOPY        HEMORRHOID SURGERY        HERNIA REPAIR         INJECTION OF FACET JOINT Bilateral      SCLEROTHERAPY WITH ULTRASOUND GUIDANCE        TOTAL HIP ARTHROPLASTY Right      TOTAL HIP ARTHROPLASTY Left      TRANSFORAMINAL EPIDURAL INJECTION OF STEROID Bilateral 5/4/2022     Procedure: INJECTION, STEROID, EPIDURAL, TRANSFORAMINAL APPROACH L3;  Surgeon: Bronwyn Hdz MD;  Location: [facility] OR;  Service: Pain Management;  Laterality: Bilateral;    TRANSFORAMINAL EPIDURAL INJECTION OF STEROID Bilateral 9/14/2022     Procedure: Injection,steroid,epidural,transforaminal approach;  Surgeon: Bronwyn Hdz MD;  Location: [facility] OR;  Service: Pain Management;  Laterality: Bilateral;  L3    TRANSFORAMINAL EPIDURAL INJECTION OF STEROID Bilateral 10/26/2022     Procedure: Injection,steroid,epidural,transforaminal approach;  Surgeon: Bronwyn Hdz MD;  Location: [facility] OR;  Service: Pain Management;  Laterality: Bilateral;  L3         Social History  Mr. Dozier  reports that he has quit smoking. His smoking use included cigarettes. He has never used smokeless tobacco. He reports current alcohol use of about 1.0 standard drink per week. He reports that he does not use drugs.     Family History  'dunia Dozier Family history is unknown by patient.     Medications and Allergies      Medications         Outpatient Medications Marked as Taking for the 1/10/23 encounter (Office Visit) with Bronwyn Hdz MD   Medication Sig Dispense Refill    albuterol (PROVENTIL/VENTOLIN HFA) 90 mcg/actuation inhaler Inhale 6.7 g into the lungs as needed.        ascorbic acid, vitamin C, 250 mg Chew Take 1 tablet by mouth once daily.        atorvastatin (LIPITOR) 10 MG tablet Take 10 mg by mouth every evening.        budesonide-formoterol 160-4.5 mcg (SYMBICORT) 160-4.5 mcg/actuation HFAA INHALE 2 PUFFS BY MOUTH INTO THE LUNGS EVERY 12 (TWELVE) HOURS. CONTROLLER 6 g 3    fluticasone propionate (FLONASE) 50 mcg/actuation nasal spray SPRAY 2 SPRAYS INTO EACH NOSTRIL TWICE A DAY (Patient taking  differently: 2 sprays once daily. SPRAY 2 SPRAYS INTO EACH NOSTRIL) 48 mL 3    gabapentin (NEURONTIN) 100 MG capsule Take 1 capsule (100 mg total) by mouth once daily. 30 capsule 6    multivitamin capsule Take 1 capsule by mouth once daily.        tiZANidine (ZANAFLEX) 2 MG tablet TAKE 1 TABLET BY MOUTH EVERY 8 HOURS AS NEEDED FOR MUSCLE SPASM (Patient taking differently: Take 2 mg by mouth every 8 (eight) hours as needed.) 90 tablet 1    vitamin D (VITAMIN D3) 1000 units Tab Take 1,000 Units by mouth once daily.             Allergies  Review of patient's allergies indicates:  No Known Allergies        Physical Examination          Vitals:     01/10/23 0955   BP: 129/78   Pulse: 84      Spine Musculoskeletal Exam     Gait    Gait is normal.     Inspection    Leg length disparity: no discrepancy    Thoracolumbar    Erythema: none    Swelling: none    Edema (right lower extremity): none    Edema (left lower extremity): none    Ecchymosis: none    Deformity: none     Strength    Thoracolumbar    Thoracolumbar motor exam is normal.        Sensory    Thoracolumbar    Thoracolumbar sensation is normal.     General      Constitutional: appears stated age, well-developed and well-nourished    Scleral icterus: no    Labored breathing: no    Psychiatric: normal mood and affect and no acute distress    Neurological: alert and oriented x3    Skin: intact      Assessment and Plan (including Health Maintenance)       Problem List   Smart Sets   Document Outside HM   :     Plan:   Disc displacement, lumbar     Chronic back pain greater than 3 months duration     Lumbar radiculopathy     I am scheduling him for bilateral L3 transforaminal epidural steroid injections again today.  He has been getting a few months of relief from these injections, but his pain is starting to gradually return now.  The plan was discussed with the patient and he wishes to proceed.

## 2023-03-15 NOTE — ANESTHESIA PREPROCEDURE EVALUATION
"                                                                                                             03/15/2023  Reji Dozier is a 72 y.o., male   Pre-operative evaluation for Procedure(s) (LRB):  Injection,steroid,epidural,transforaminal approach (Bilateral)    Ht 5' 2.01" (1.575 m)   Wt 69.9 kg (154 lb)   BMI 28.16 kg/m²     Past Medical History:   Diagnosis Date    Actinic keratosis     Arthritis     hip    Asthma     Chronic back pain     COPD (chronic obstructive pulmonary disease)     Dyslipidemia     Elevated PSA measurement     Lower extremity weakness     Lumbar radiculitis     Lumbar spinal stenosis     Mixed hyperlipidemia     Nocturia     Radicular low back pain     Skin lesion     Solar elastosis     Umbilical hernia        Patient Active Problem List   Diagnosis    Chronic back pain greater than 3 months duration    Disc displacement, lumbar    Lumbar radiculopathy    Moderate COPD (chronic obstructive pulmonary disease)    HLD (hyperlipidemia)    Elevated PSA    Chronic midline low back pain without sciatica    Actinic keratosis       Review of patient's allergies indicates:  No Known Allergies    Current Outpatient Medications   Medication Instructions    acetaminophen (TYLENOL) 1,000 mg, Oral, Every 6 hours PRN    albuterol (PROVENTIL/VENTOLIN HFA) 6.7 g, Inhalation, As needed (PRN)    ascorbic acid, vitamin C, 250 mg Chew 1 tablet, Oral, Daily    aspirin/salicylamide/caffeine (BC HEADACHE POWDER ORAL) 1 tablet, Oral, As needed (PRN)    atorvastatin (LIPITOR) 10 mg, Oral, Nightly    fluticasone propionate (FLONASE) 50 mcg/actuation nasal spray SPRAY 2 SPRAYS INTO EACH NOSTRIL TWICE A DAY    fluticasone-umeclidin-vilanter (TRELEGY ELLIPTA) 200-62.5-25 mcg inhaler 1 puff, Inhalation, Daily    gabapentin (NEURONTIN) 100 MG capsule TAKE 1 CAPSULE BY MOUTH ONCE DAILY.    ipratropium (ATROVENT) 500 mcg, Nebulization, 3 times daily    multivitamin capsule " 1 capsule, Oral, Daily    ondansetron (ZOFRAN) 4 mg, Oral, As needed (PRN)    tiZANidine (ZANAFLEX) 2 MG tablet TAKE 1 TABLET BY MOUTH EVERY 8 HOURS AS NEEDED FOR MUSCLE SPASM    vitamin D (VITAMIN D3) 1,000 Units, Oral, Daily       Past Surgical History:   Procedure Laterality Date    COLONOSCOPY      control bleeding      of gastrointestinal tract    ESOPHAGOGASTRODUODENOSCOPY      HEMORRHOID SURGERY      HERNIA REPAIR      INJECTION OF FACET JOINT Bilateral     SCLEROTHERAPY WITH ULTRASOUND GUIDANCE      TOTAL HIP ARTHROPLASTY Right     TOTAL HIP ARTHROPLASTY Left     TRANSFORAMINAL EPIDURAL INJECTION OF STEROID Bilateral 5/4/2022    Procedure: INJECTION, STEROID, EPIDURAL, TRANSFORAMINAL APPROACH L3;  Surgeon: Bronwyn Hdz MD;  Location: [facility] OR;  Service: Pain Management;  Laterality: Bilateral;    TRANSFORAMINAL EPIDURAL INJECTION OF STEROID Bilateral 9/14/2022    Procedure: Injection,steroid,epidural,transforaminal approach;  Surgeon: Bronwyn Hdz MD;  Location: [facility] OR;  Service: Pain Management;  Laterality: Bilateral;  L3    TRANSFORAMINAL EPIDURAL INJECTION OF STEROID Bilateral 10/26/2022    Procedure: Injection,steroid,epidural,transforaminal approach;  Surgeon: Bronwyn Hdz MD;  Location: [facility] OR;  Service: Pain Management;  Laterality: Bilateral;  L3         Lab Results   Component Value Date    WBC 6.6 11/23/2022    HGB 15.4 11/23/2022    HCT 46.5 11/23/2022    MCV 89.3 11/23/2022     11/23/2022          BMP  Lab Results   Component Value Date     11/23/2022    K 4.1 11/23/2022    CHLORIDE 108 (H) 11/23/2022    CO2 24 11/23/2022    GLUCOSE 97 11/23/2022    BUN 11.6 11/23/2022    CREATININE 0.84 11/23/2022    CALCIUM 9.4 11/23/2022    EGFRNONAA >60 06/24/2022        INR  No results for input(s): PT, INR, PROTIME, APTT in the last 72 hours.        Diagnostic Studies:      EKG:  No results found for this or any previous visit.    No results found for this or any  previous visit.        .      Pre-op Assessment    I have reviewed the Patient Summary Reports.    I have reviewed the NPO Status.   I have reviewed the Medications.     Review of Systems  Anesthesia Hx:  No problems with previous Anesthesia  Denies Family Hx of Anesthesia complications.   Denies Personal Hx of Anesthesia complications.   Cardiovascular:  Cardiovascular Normal  No Cardiac Complaints   Pulmonary:  Pulmonary Normal No Pulmonary Complaints   Hepatic/GI:   No Current GERD Sx       Physical Exam  General: Alert and Oriented    Airway:  Mallampati: II   Mouth Opening: Normal  TM Distance: Normal  Tongue: Normal  Neck ROM: Normal ROM    Dental:  Intact, Caps / Implants    Chest/Lungs:  Clear to auscultation, Normal Respiratory Rate    Heart:  Rate: Normal  Rhythm: Regular Rhythm        Anesthesia Plan  Type of Anesthesia, risks & benefits discussed:    Anesthesia Type: Gen Natural Airway  Intra-op Monitoring Plan: Standard ASA Monitors  Post Op Pain Control Plan: multimodal analgesia  Induction:  IV  Airway Plan: Direct  Informed Consent: Informed consent signed with the Patient and all parties understand the risks and agree with anesthesia plan.  All questions answered.   ASA Score: 3  Day of Surgery Review of History & Physical: H&P Update referred to the surgeon/provider.  Anesthesia Plan Notes: Nasal cannula vs facemask supplemental oxygenation   For patients with CATHLEEN/obesity, may consider SuperNoval Nasal CPAP      Ready For Surgery From Anesthesia Perspective.     .

## 2023-04-06 ENCOUNTER — OFFICE VISIT (OUTPATIENT)
Dept: PAIN MEDICINE | Facility: CLINIC | Age: 73
End: 2023-04-06
Payer: MEDICARE

## 2023-04-06 VITALS
HEART RATE: 74 BPM | WEIGHT: 152 LBS | DIASTOLIC BLOOD PRESSURE: 67 MMHG | TEMPERATURE: 98 F | SYSTOLIC BLOOD PRESSURE: 124 MMHG | BODY MASS INDEX: 27.97 KG/M2 | HEIGHT: 62 IN

## 2023-04-06 DIAGNOSIS — G89.29 CHRONIC BACK PAIN GREATER THAN 3 MONTHS DURATION: ICD-10-CM

## 2023-04-06 DIAGNOSIS — M54.16 LUMBAR RADICULOPATHY: ICD-10-CM

## 2023-04-06 DIAGNOSIS — M51.26 DISC DISPLACEMENT, LUMBAR: Primary | ICD-10-CM

## 2023-04-06 DIAGNOSIS — M54.9 CHRONIC BACK PAIN GREATER THAN 3 MONTHS DURATION: ICD-10-CM

## 2023-04-06 PROCEDURE — 99213 OFFICE O/P EST LOW 20 MIN: CPT | Mod: ,,, | Performed by: ANESTHESIOLOGY

## 2023-04-06 PROCEDURE — 99213 PR OFFICE/OUTPT VISIT, EST, LEVL III, 20-29 MIN: ICD-10-PCS | Mod: ,,, | Performed by: ANESTHESIOLOGY

## 2023-04-06 NOTE — PROGRESS NOTES
Bronwyn Hdz MD        PATIENT NAME: Reji Dozier  : 1950  DATE: 23  MRN: [id number]      Billing Provider: Bronwyn Hdz MD  Level of Service:   Patient PCP Information       Provider PCP Type    Wilton Welch MD General            Reason for Visit / Chief Complaint: Low-back Pain (Post-op TFESI, pt states he received great relief, pain has decreased, prescription medication for relief, denies pain today )       Update PCP  Update Chief Complaint         History of Present Illness / Problem Focused Workflow     Reji Dozier presents to the clinic with Low-back Pain (Post-op TFESI, pt states he received great relief, pain has decreased, prescription medication for relief, denies pain today )       This is a 72-year-old male who returns to clinic today for follow-up of his chronic low back pain.  He underwent bilateral L3 transforaminal epidural steroid injections a couple weeks ago.  He has been doing really well since then.  Currently, he does not have any pain.  His pain has gotten up to 2/10 at worst since his injections,  he was able to do some yd work.  He finds that the gabapentin does help and is just taking it once per day.      Back Pain    Follow-up    Low-back Pain      Review of Systems     Review of Systems   Musculoskeletal:  Positive for back pain.   All other systems reviewed and are negative.     Medical / Social / Family History     Past Medical History:   Diagnosis Date    Actinic keratosis     Arthritis     hip    Asthma     Chronic back pain     COPD (chronic obstructive pulmonary disease)     Dyslipidemia     Elevated PSA measurement     Lower extremity weakness     Lumbar radiculitis     Lumbar spinal stenosis     Mixed hyperlipidemia     Nocturia     Radicular low back pain     Skin lesion     Solar elastosis     Umbilical hernia        Past Surgical History:   Procedure Laterality Date    COLONOSCOPY      control bleeding      of gastrointestinal tract     ESOPHAGOGASTRODUODENOSCOPY      HEMORRHOID SURGERY      HERNIA REPAIR      INJECTION OF FACET JOINT Bilateral     SCLEROTHERAPY WITH ULTRASOUND GUIDANCE      TOTAL HIP ARTHROPLASTY Right     TOTAL HIP ARTHROPLASTY Left     TRANSFORAMINAL EPIDURAL INJECTION OF STEROID Bilateral 5/4/2022    Procedure: INJECTION, STEROID, EPIDURAL, TRANSFORAMINAL APPROACH L3;  Surgeon: Bronwyn Hdz MD;  Location: [facility] OR;  Service: Pain Management;  Laterality: Bilateral;    TRANSFORAMINAL EPIDURAL INJECTION OF STEROID Bilateral 9/14/2022    Procedure: Injection,steroid,epidural,transforaminal approach;  Surgeon: Bronwyn Hdz MD;  Location: [facility] OR;  Service: Pain Management;  Laterality: Bilateral;  L3    TRANSFORAMINAL EPIDURAL INJECTION OF STEROID Bilateral 10/26/2022    Procedure: Injection,steroid,epidural,transforaminal approach;  Surgeon: Bronwyn Hdz MD;  Location: [facility] OR;  Service: Pain Management;  Laterality: Bilateral;  L3    TRANSFORAMINAL EPIDURAL INJECTION OF STEROID Bilateral 3/15/2023    Procedure: Injection,steroid,epidural,transforaminal approach;  Surgeon: Bronwyn Hdz MD;  Location: [facility] OR;  Service: Pain Management;  Laterality: Bilateral;  L3       Social History  Mr. Dozier  reports that he has quit smoking. His smoking use included cigarettes. He has never used smokeless tobacco. He reports current alcohol use of about 1.0 standard drink per week. He reports that he does not use drugs.    Family History  Mr.'s Dozier Family history is unknown by patient.    Medications and Allergies     Medications  Outpatient Medications Marked as Taking for the 4/6/23 encounter (Office Visit) with Bronwyn Hdz MD   Medication Sig Dispense Refill    acetaminophen (TYLENOL) 500 MG tablet Take 1,000 mg by mouth every 6 (six) hours as needed for Pain.      albuterol (PROVENTIL/VENTOLIN HFA) 90 mcg/actuation inhaler Inhale 6.7 g into the lungs as needed.      ascorbic acid, vitamin C, 250 mg Chew Take 1  tablet by mouth once daily.      aspirin/salicylamide/caffeine (BC HEADACHE POWDER ORAL) Take 1 tablet by mouth as needed.      atorvastatin (LIPITOR) 10 MG tablet Take 1 tablet (10 mg total) by mouth every evening. 90 tablet 3    fluticasone propionate (FLONASE) 50 mcg/actuation nasal spray SPRAY 2 SPRAYS INTO EACH NOSTRIL TWICE A DAY (Patient taking differently: 2 sprays once daily. SPRAY 2 SPRAYS INTO EACH NOSTRIL) 48 mL 3    fluticasone-umeclidin-vilanter (TRELEGY ELLIPTA) 200-62.5-25 mcg inhaler Inhale 1 puff into the lungs once daily. 60 each 11    gabapentin (NEURONTIN) 100 MG capsule TAKE 1 CAPSULE BY MOUTH ONCE DAILY. (Patient taking differently: Take 100 mg by mouth once daily.) 30 capsule 6    ipratropium (ATROVENT) 0.02 % nebulizer solution Take 2.5 mLs (500 mcg total) by nebulization 3 (three) times daily. (Patient taking differently: Take 500 mcg by nebulization daily as needed.) 90 mL 3    multivitamin capsule Take 1 capsule by mouth once daily.      ondansetron (ZOFRAN) 4 MG tablet Take 4 mg by mouth as needed.      tiZANidine (ZANAFLEX) 2 MG tablet TAKE 1 TABLET BY MOUTH EVERY 8 HOURS AS NEEDED FOR MUSCLE SPASM (Patient taking differently: Take 2 mg by mouth Daily.) 90 tablet 1    vitamin D (VITAMIN D3) 1000 units Tab Take 1,000 Units by mouth once daily.         Allergies  Review of patient's allergies indicates:  No Known Allergies      Physical Examination     Vitals:    04/06/23 1122   BP: 124/67   Pulse: 74   Temp: 97.5 °F (36.4 °C)     Spine Musculoskeletal Exam    Gait    Gait is normal.    Inspection    Leg length disparity: no discrepancy    Thoracolumbar    Erythema: none    Swelling: none    Edema (right lower extremity): none    Edema (left lower extremity): none    Ecchymosis: none    Deformity: none    Strength    Thoracolumbar    Thoracolumbar motor exam is normal.       Sensory    Thoracolumbar    Thoracolumbar sensation is normal.    General      Constitutional: appears stated age,  well-developed and well-nourished    Scleral icterus: no    Labored breathing: no    Psychiatric: normal mood and affect and no acute distress    Neurological: alert and oriented x3    Skin: intact     Assessment and Plan (including Health Maintenance)      Problem List  Smart Sets  Document Outside HM   :    Plan:   Disc displacement, lumbar    Lumbar radiculopathy    Chronic back pain greater than 3 months duration      He is doing well at this time and does not have any pain since his injection a couple weeks ago.  I will plan to follow up with him again in about 6 months or sooner if needed.    Problem List Items Addressed This Visit       Chronic back pain greater than 3 months duration    Disc displacement, lumbar - Primary    Lumbar radiculopathy         Future Appointments   Date Time Provider Department Center   5/29/2023  9:00 AM Wilton Welch MD [facility]   12/5/2023  9:00 AM MD [facility]        There are no Patient Instructions on file for this visit.  No follow-ups on file.     Signature:  Bronwyn Hdz MD      Date of encounter: 4/6/23

## 2023-04-28 ENCOUNTER — TELEPHONE (OUTPATIENT)
Dept: FAMILY MEDICINE | Facility: CLINIC | Age: 73
End: 2023-04-28
Payer: MEDICARE

## 2023-04-28 DIAGNOSIS — M54.16 LUMBAR RADICULOPATHY: Primary | ICD-10-CM

## 2023-04-28 DIAGNOSIS — M54.2 CHRONIC NECK PAIN: ICD-10-CM

## 2023-04-28 DIAGNOSIS — G89.29 CHRONIC NECK PAIN: ICD-10-CM

## 2023-04-28 NOTE — TELEPHONE ENCOUNTER
Patient did contact me asking for me to order imaging for his neck and lower back.    The imaging ordered includes:  C spine 3 views  L spine 3 view  Pelvis AP view    Patient will then need the xray and his previous MRI lumbar spine images on a CD which will have to be provided from our imaging department      Please call patient (I attempted but now answer) and let him know xray orders are there, make sure he schedules this. Also provide him number to the radiology department/imaging department to help him get a CD of the xray and his previous MRI lumbar spine done (2/2022) as well.    Thanks

## 2023-05-02 ENCOUNTER — HOSPITAL ENCOUNTER (OUTPATIENT)
Dept: RADIOLOGY | Facility: HOSPITAL | Age: 73
Discharge: HOME | End: 2023-05-02
Attending: INTERNAL MEDICINE
Payer: MEDICARE

## 2023-05-02 DIAGNOSIS — M54.16 LUMBAR RADICULOPATHY: ICD-10-CM

## 2023-05-02 DIAGNOSIS — G89.29 CHRONIC NECK PAIN: ICD-10-CM

## 2023-05-02 DIAGNOSIS — M54.2 CHRONIC NECK PAIN: ICD-10-CM

## 2023-05-02 PROCEDURE — 72100 X-RAY EXAM L-S SPINE 2/3 VWS: CPT | Mod: TC

## 2023-05-02 PROCEDURE — 72040 X-RAY EXAM NECK SPINE 2-3 VW: CPT | Mod: TC

## 2023-05-02 PROCEDURE — 72170 X-RAY EXAM OF PELVIS: CPT | Mod: TC

## 2023-05-03 DIAGNOSIS — J44.9 MODERATE COPD (CHRONIC OBSTRUCTIVE PULMONARY DISEASE): Primary | ICD-10-CM

## 2023-05-03 RX ORDER — ALBUTEROL SULFATE 90 UG/1
2 AEROSOL, METERED RESPIRATORY (INHALATION)
Qty: 18 G | Refills: 3 | Status: SHIPPED | OUTPATIENT
Start: 2023-05-03 | End: 2023-05-09 | Stop reason: SDUPTHER

## 2023-05-09 DIAGNOSIS — J44.9 MODERATE COPD (CHRONIC OBSTRUCTIVE PULMONARY DISEASE): ICD-10-CM

## 2023-05-09 RX ORDER — ALBUTEROL SULFATE 90 UG/1
2 AEROSOL, METERED RESPIRATORY (INHALATION) EVERY 6 HOURS PRN
Qty: 18 G | Refills: 3 | Status: SHIPPED | OUTPATIENT
Start: 2023-05-09

## 2023-05-16 ENCOUNTER — TELEPHONE (OUTPATIENT)
Dept: FAMILY MEDICINE | Facility: CLINIC | Age: 73
End: 2023-05-16
Payer: MEDICARE

## 2023-05-16 DIAGNOSIS — E78.2 MIXED HYPERLIPIDEMIA: Primary | ICD-10-CM

## 2023-05-16 RX ORDER — ATORVASTATIN CALCIUM 10 MG/1
10 TABLET, FILM COATED ORAL NIGHTLY
Qty: 90 TABLET | Refills: 3 | Status: SHIPPED | OUTPATIENT
Start: 2023-05-16 | End: 2023-11-09 | Stop reason: SDUPTHER

## 2023-05-16 NOTE — TELEPHONE ENCOUNTER
----- Message from Buffy Hager LPN sent at 5/16/2023 10:31 AM CDT -----  Regarding: FW: Refill Request    ----- Message -----  From: Donita Correia  Sent: 5/16/2023   9:46 AM CDT  To: Kasandra FIELDS Staff  Subject: Refill Request                                   .Type:  RX Refill Request    Who Called: pt  Refill or New Rx:refill   RX Name and Strength:atorvastatin (LIPITOR) 10 MG tablet  How is the patient currently taking it? (ex. 1XDay):1x  Is this a 30 day or 90 day RX:90  Preferred Pharmacy with phone number:[facility]/PHARMACY #[id number] [place] [address] [place]  Local or Mail Order:local  Ordering Provider:Wilton Welch  Would the patient rather a call back or a response via [facility]?   Best Call Back Number:[phone]  Additional Information: pt is requesting an refill,  please advise

## 2023-05-23 ENCOUNTER — PATIENT MESSAGE (OUTPATIENT)
Dept: RESEARCH | Facility: HOSPITAL | Age: 73
End: 2023-05-23
Payer: MEDICARE

## 2023-05-24 ENCOUNTER — LAB VISIT (OUTPATIENT)
Dept: LAB | Facility: HOSPITAL | Age: 73
End: 2023-05-24
Attending: INTERNAL MEDICINE
Payer: MEDICARE

## 2023-05-24 DIAGNOSIS — E78.2 MIXED HYPERLIPIDEMIA: ICD-10-CM

## 2023-05-24 DIAGNOSIS — J44.9 MODERATE COPD (CHRONIC OBSTRUCTIVE PULMONARY DISEASE): ICD-10-CM

## 2023-05-24 DIAGNOSIS — R97.20 ELEVATED PSA: ICD-10-CM

## 2023-05-24 LAB
APPEARANCE UR: CLEAR
BILIRUB UR QL STRIP.AUTO: NEGATIVE MG/DL
COLOR UR: YELLOW
GLUCOSE UR QL STRIP.AUTO: NEGATIVE MG/DL
KETONES UR QL STRIP.AUTO: NEGATIVE MG/DL
LEUKOCYTE ESTERASE UR QL STRIP.AUTO: NEGATIVE UNIT/L
NITRITE UR QL STRIP.AUTO: NEGATIVE
PH UR STRIP.AUTO: 6 [PH]
PROT UR QL STRIP.AUTO: NEGATIVE MG/DL
RBC UR QL AUTO: NEGATIVE UNIT/L
SP GR UR STRIP.AUTO: 1.02
UROBILINOGEN UR STRIP-ACNC: 0.2 MG/DL

## 2023-05-24 PROCEDURE — 81003 URINALYSIS AUTO W/O SCOPE: CPT

## 2023-05-29 ENCOUNTER — OFFICE VISIT (OUTPATIENT)
Dept: FAMILY MEDICINE | Facility: CLINIC | Age: 73
End: 2023-05-29
Payer: MEDICARE

## 2023-05-29 VITALS
WEIGHT: 150 LBS | DIASTOLIC BLOOD PRESSURE: 82 MMHG | HEIGHT: 62 IN | TEMPERATURE: 99 F | HEART RATE: 66 BPM | BODY MASS INDEX: 27.6 KG/M2 | RESPIRATION RATE: 16 BRPM | SYSTOLIC BLOOD PRESSURE: 134 MMHG | OXYGEN SATURATION: 96 %

## 2023-05-29 DIAGNOSIS — Z12.5 SCREENING FOR MALIGNANT NEOPLASM OF PROSTATE: ICD-10-CM

## 2023-05-29 DIAGNOSIS — J44.9 MODERATE COPD (CHRONIC OBSTRUCTIVE PULMONARY DISEASE): Primary | ICD-10-CM

## 2023-05-29 DIAGNOSIS — Z00.00 WELLNESS EXAMINATION: ICD-10-CM

## 2023-05-29 DIAGNOSIS — M54.16 LUMBAR RADICULOPATHY: ICD-10-CM

## 2023-05-29 DIAGNOSIS — E78.2 MIXED HYPERLIPIDEMIA: ICD-10-CM

## 2023-05-29 DIAGNOSIS — R97.20 ELEVATED PSA: ICD-10-CM

## 2023-05-29 PROBLEM — M51.26 DISC DISPLACEMENT, LUMBAR: Status: RESOLVED | Noted: 2022-05-26 | Resolved: 2023-05-29

## 2023-05-29 PROCEDURE — 99214 PR OFFICE/OUTPT VISIT, EST, LEVL IV, 30-39 MIN: ICD-10-PCS | Mod: ,,, | Performed by: INTERNAL MEDICINE

## 2023-05-29 PROCEDURE — 99214 OFFICE O/P EST MOD 30 MIN: CPT | Mod: ,,, | Performed by: INTERNAL MEDICINE

## 2023-05-29 NOTE — PROGRESS NOTES
"Subjective:      Patient ID: Reji Dozier is a 72 y.o. male.    Chief Complaint: Follow-up    HPI     6 month f/u visit   Doing well today no acute issues     COPD: compliant with Trelegy, sx controlled    HLD: compliant with statin, no myalgias    Lower back pain and RLE radiculopathy:  S/p FABY with Dr. Hdz and goes to chiropracter PRN  Says this is helping to manage pain    Elevated PSA: last OV note reviewed Dr. Harris [facility] urology, patient says at last OV was told his prostate biospy samples was normal and some confusion about 6 month or 1 year follow up     flu shot: due at local pharmacy 2022  COVID 19 vaccine: x 3 doses  Pneumovax and Prevnar completed     Health Maintenance Due   Topic Date Due    Shingles Vaccine (1 of 2) Never done    COVID-19 Vaccine (4 - Moderna series) 02/01/2022     Review of Systems   Constitutional:  Negative for chills, fatigue and fever.   HENT:  Negative for congestion, ear pain, postnasal drip, rhinorrhea, sinus pressure and sore throat.    Eyes:  Negative for itching and visual disturbance.   Respiratory:  Negative for cough, shortness of breath and wheezing.    Cardiovascular:  Negative for chest pain, palpitations and leg swelling.   Gastrointestinal:  Negative for abdominal pain and nausea.   Genitourinary:  Negative for dysuria.   Musculoskeletal:  Negative for arthralgias and myalgias.   Skin:  Negative for rash.   Neurological:  Negative for weakness, light-headedness and headaches.     Objective:     Vitals:    05/29/23 0857   BP: 134/82   BP Location: Right arm   Patient Position: Sitting   BP Method: Large (Automatic)   Pulse: 66   Resp: 16   Temp: 98.6 °F (37 °C)   TempSrc: Temporal   SpO2: 96%   Weight: 68 kg (150 lb)   Height: 5' 2" (1.575 m)     Physical Exam  Vitals and nursing note reviewed.   Constitutional:       General: He is not in acute distress.     Appearance: Normal appearance. He is not ill-appearing.   HENT:      Head: Normocephalic " and atraumatic.   Eyes:      Pupils: Pupils are equal, round, and reactive to light.   Cardiovascular:      Rate and Rhythm: Normal rate and regular rhythm.      Heart sounds: No murmur heard.  Pulmonary:      Effort: Pulmonary effort is normal.      Breath sounds: No wheezing.   Abdominal:      General: Abdomen is flat. There is no distension.      Palpations: Abdomen is soft. There is no mass.      Tenderness: There is no abdominal tenderness. There is no guarding.   Musculoskeletal:      Cervical back: Neck supple.   Lymphadenopathy:      Cervical: No cervical adenopathy.   Neurological:      Mental Status: He is alert.   Psychiatric:         Behavior: Behavior normal.     Assessment/Plan:     1. Moderate COPD (chronic obstructive pulmonary disease)  -     Comprehensive Metabolic Panel; Future; Expected date: 11/06/2023  -     Lipid Panel; Future; Expected date: 11/06/2023  -     CBC Auto Differential; Future; Expected date: 11/06/2023  -     Urinalysis; Future; Expected date: 11/06/2023  -     PSA, Screening; Future; Expected date: 11/06/2023  Stable continue trelegy  2. Wellness examination  -     Comprehensive Metabolic Panel; Future; Expected date: 11/06/2023  -     Lipid Panel; Future; Expected date: 11/06/2023  -     CBC Auto Differential; Future; Expected date: 11/06/2023  -     Urinalysis; Future; Expected date: 11/06/2023  -     PSA, Screening; Future; Expected date: 11/06/2023  Fasting labs for next OV  3. Mixed hyperlipidemia  -     Comprehensive Metabolic Panel; Future; Expected date: 11/06/2023  -     Lipid Panel; Future; Expected date: 11/06/2023  -     CBC Auto Differential; Future; Expected date: 11/06/2023  -     Urinalysis; Future; Expected date: 11/06/2023  -     PSA, Screening; Future; Expected date: 11/06/2023  Stable, continue statin therapy  4. Elevated PSA  -     Comprehensive Metabolic Panel; Future; Expected date: 11/06/2023  -     Lipid Panel; Future; Expected date: 11/06/2023  -      CBC Auto Differential; Future; Expected date: 11/06/2023  -     Urinalysis; Future; Expected date: 11/06/2023  -     PSA, Screening; Future; Expected date: 11/06/2023  Request outside records to understand more from urology evaluation, last OV was not very explanatory  5. Screening for malignant neoplasm of prostate  -     Comprehensive Metabolic Panel; Future; Expected date: 11/06/2023  -     Lipid Panel; Future; Expected date: 11/06/2023  -     CBC Auto Differential; Future; Expected date: 11/06/2023  -     Urinalysis; Future; Expected date: 11/06/2023  -     PSA, Screening; Future; Expected date: 11/06/2023    6. Lumbar radiculopathy  Stable, sx controlled  Continue current Rx regimen     Follow up in about 6 months (around 11/29/2023) for Medicare Wellness.    This includes face to face time and non-face to face time preparing to see the patient (eg, review of tests), obtaining and/or reviewing separately obtained history, documenting clinical information in the electronic or other health record, independently interpreting results and communicating results to the patient/family/caregiver, or care coordinator.

## 2023-07-05 ENCOUNTER — TELEPHONE (OUTPATIENT)
Dept: PAIN MEDICINE | Facility: CLINIC | Age: 73
End: 2023-07-05
Payer: MEDICARE

## 2023-07-05 NOTE — TELEPHONE ENCOUNTER
----- Message from Linda Us sent at 7/5/2023  2:39 PM CDT -----  Regarding: pain  Mr. Mendoza phoned the office today because his having increased back pain that's limiting his daily activities.  He wanted a sooner appointment.  His next appointment was in October.  I've scheduled him for 07-27-23 @ 11:45 because that's the soonest you have.  He is taking the Gabapentin but it's not helping the pain.  He thinks he may need another injection.

## 2023-07-27 ENCOUNTER — OFFICE VISIT (OUTPATIENT)
Dept: PAIN MEDICINE | Facility: CLINIC | Age: 73
End: 2023-07-27
Payer: MEDICARE

## 2023-07-27 VITALS — TEMPERATURE: 99 F | DIASTOLIC BLOOD PRESSURE: 60 MMHG | HEART RATE: 60 BPM | SYSTOLIC BLOOD PRESSURE: 128 MMHG

## 2023-07-27 DIAGNOSIS — G89.29 CHRONIC BACK PAIN GREATER THAN 3 MONTHS DURATION: ICD-10-CM

## 2023-07-27 DIAGNOSIS — M54.16 LUMBAR RADICULOPATHY: Primary | ICD-10-CM

## 2023-07-27 DIAGNOSIS — M54.9 CHRONIC BACK PAIN GREATER THAN 3 MONTHS DURATION: ICD-10-CM

## 2023-07-27 DIAGNOSIS — M48.061 SPINAL STENOSIS OF LUMBAR REGION, UNSPECIFIED WHETHER NEUROGENIC CLAUDICATION PRESENT: ICD-10-CM

## 2023-07-27 DIAGNOSIS — M51.36 LUMBAR DEGENERATIVE DISC DISEASE: ICD-10-CM

## 2023-07-27 PROBLEM — M51.369 LUMBAR DEGENERATIVE DISC DISEASE: Status: ACTIVE | Noted: 2023-07-27

## 2023-07-27 PROCEDURE — 99213 PR OFFICE/OUTPT VISIT, EST, LEVL III, 20-29 MIN: ICD-10-PCS | Mod: ,,, | Performed by: ANESTHESIOLOGY

## 2023-07-27 PROCEDURE — 99213 OFFICE O/P EST LOW 20 MIN: CPT | Mod: ,,, | Performed by: ANESTHESIOLOGY

## 2023-07-27 NOTE — PROGRESS NOTES
Bronwyn Hdz MD        PATIENT NAME: Reji Dozier  : 1950  DATE: 23  MRN: [id number]      Billing Provider: Bronwyn Hdz MD  Level of Service:   Patient PCP Information       Provider PCP Type    Wilton Welch MD General            Reason for Visit / Chief Complaint: Back Pain (Pt is here today for increased back pain, pt would like to schedule injection, BC powder for relief, pain level 6/10)       Update PCP  Update Chief Complaint         History of Present Illness / Problem Focused Workflow     Reji Dozier presents to the clinic with Back Pain (Pt is here today for increased back pain, pt would like to schedule injection, BC powder for relief, pain level 6/10)       This is a 72-year-old male who returns to clinic today for follow-up of his chronic low back pain.  He underwent bilateral L3 transforaminal epidural steroid injections in March of this year and was still doing well at his follow up a couple of weeks later.  However he returns today and states that his pain started to come back not long after his last appointment.  He would like to schedule another injection.      Back Pain    Follow-up    Low-back Pain      Review of Systems     Review of Systems   Musculoskeletal:  Positive for back pain.   All other systems reviewed and are negative.     Medical / Social / Family History     Past Medical History:   Diagnosis Date    Actinic keratosis     Arthritis     hip    Asthma     Chronic back pain     COPD (chronic obstructive pulmonary disease)     Dyslipidemia     Elevated PSA measurement     Lower extremity weakness     Lumbar radiculitis     Lumbar spinal stenosis     Mixed hyperlipidemia     Nocturia     Radicular low back pain     Skin lesion     Solar elastosis     Umbilical hernia        Past Surgical History:   Procedure Laterality Date    COLONOSCOPY      control bleeding      of gastrointestinal tract    ESOPHAGOGASTRODUODENOSCOPY      HEMORRHOID SURGERY       HERNIA REPAIR      INJECTION OF FACET JOINT Bilateral     SCLEROTHERAPY WITH ULTRASOUND GUIDANCE      TOTAL HIP ARTHROPLASTY Right     TOTAL HIP ARTHROPLASTY Left     TRANSFORAMINAL EPIDURAL INJECTION OF STEROID Bilateral 5/4/2022    Procedure: INJECTION, STEROID, EPIDURAL, TRANSFORAMINAL APPROACH L3;  Surgeon: Bronwyn Hdz MD;  Location: [facility] OR;  Service: Pain Management;  Laterality: Bilateral;    TRANSFORAMINAL EPIDURAL INJECTION OF STEROID Bilateral 9/14/2022    Procedure: Injection,steroid,epidural,transforaminal approach;  Surgeon: Bronwyn Hdz MD;  Location: [facility] OR;  Service: Pain Management;  Laterality: Bilateral;  L3    TRANSFORAMINAL EPIDURAL INJECTION OF STEROID Bilateral 10/26/2022    Procedure: Injection,steroid,epidural,transforaminal approach;  Surgeon: Bronwyn Hdz MD;  Location: [facility] OR;  Service: Pain Management;  Laterality: Bilateral;  L3    TRANSFORAMINAL EPIDURAL INJECTION OF STEROID Bilateral 3/15/2023    Procedure: Injection,steroid,epidural,transforaminal approach;  Surgeon: Bronwyn Hdz MD;  Location: [facility] OR;  Service: Pain Management;  Laterality: Bilateral;  L3       Social History  Mr. Dozier  reports that he has quit smoking. His smoking use included cigarettes. He has never used smokeless tobacco. He reports current alcohol use of about 1.0 standard drink per week. He reports that he does not use drugs.    Family History  Mr.'s Dozier Family history is unknown by patient.    Medications and Allergies     Medications  Outpatient Medications Marked as Taking for the 7/27/23 encounter (Office Visit) with Bronwyn Hdz MD   Medication Sig Dispense Refill    acetaminophen (TYLENOL) 500 MG tablet Take 1,000 mg by mouth every 6 (six) hours as needed for Pain.      albuterol (PROVENTIL/VENTOLIN HFA) 90 mcg/actuation inhaler Inhale 2 puffs into the lungs every 6 (six) hours as needed for Wheezing. 18 g 3    ascorbic acid, vitamin C, 250 mg Chew Take 1 tablet by mouth  once daily.      aspirin/salicylamide/caffeine (BC HEADACHE POWDER ORAL) Take 1 tablet by mouth as needed.      atorvastatin (LIPITOR) 10 MG tablet Take 1 tablet (10 mg total) by mouth every evening. 90 tablet 3    fluticasone propionate (FLONASE) 50 mcg/actuation nasal spray SPRAY 2 SPRAYS INTO EACH NOSTRIL TWICE A DAY (Patient taking differently: 2 sprays once daily. SPRAY 2 SPRAYS INTO EACH NOSTRIL) 48 mL 3    fluticasone-umeclidin-vilanter (TRELEGY ELLIPTA) 200-62.5-25 mcg inhaler Inhale 1 puff into the lungs once daily. 60 each 11    gabapentin (NEURONTIN) 100 MG capsule TAKE 1 CAPSULE BY MOUTH ONCE DAILY. (Patient taking differently: Take 100 mg by mouth once daily.) 30 capsule 6    ipratropium (ATROVENT) 0.02 % nebulizer solution Take 2.5 mLs (500 mcg total) by nebulization 3 (three) times daily. (Patient taking differently: Take 500 mcg by nebulization daily as needed.) 90 mL 3    multivitamin capsule Take 1 capsule by mouth once daily.      ondansetron (ZOFRAN) 4 MG tablet Take 4 mg by mouth as needed.      tiZANidine (ZANAFLEX) 2 MG tablet TAKE 1 TABLET BY MOUTH EVERY 8 HOURS AS NEEDED FOR MUSCLE SPASM (Patient taking differently: Take 2 mg by mouth Daily.) 90 tablet 1    vitamin D (VITAMIN D3) 1000 units Tab Take 1,000 Units by mouth once daily.         Allergies  Review of patient's allergies indicates:  No Known Allergies      Physical Examination     Vitals:    07/27/23 1154   BP: 128/60   Pulse: 60   Temp: 98.7 °F (37.1 °C)     Spine Musculoskeletal Exam    Gait    Gait is normal.    Inspection    Leg length disparity: no discrepancy    Thoracolumbar    Erythema: none    Swelling: none    Edema (right lower extremity): none    Edema (left lower extremity): none    Ecchymosis: none    Deformity: none    Strength    Thoracolumbar    Thoracolumbar motor exam is normal.       Sensory    Thoracolumbar    Thoracolumbar sensation is normal.    General      Constitutional: appears stated age, well-developed  and well-nourished    Scleral icterus: no    Labored breathing: no    Psychiatric: normal mood and affect and no acute distress    Neurological: alert and oriented x3    Skin: intact   CV: extremities warm, well-perfused  Resp: nonlabored breathing    Assessment and Plan (including Health Maintenance)      Problem List  Smart Sets  Document Outside HM   :    Plan:   Lumbar radiculopathy  -     Case Request Operating Room - [facility]: Injection,steroid,epidural,transforaminal approach    Chronic back pain greater than 3 months duration    Lumbar degenerative disc disease    Spinal stenosis of lumbar region, unspecified whether neurogenic claudication present    I am scheduling him for bilateral L3 transforaminal epidural steroid injections today.  The plan was discussed with the patient and he wishes to proceed.    Problem List Items Addressed This Visit       Chronic back pain greater than 3 months duration    Lumbar radiculopathy - Primary    Relevant Orders    Case Request Operating Room - [facility]: Injection,steroid,epidural,transforaminal approach (Completed)    Lumbar degenerative disc disease    Spinal stenosis of lumbar region         Future Appointments   Date Time Provider Department Center   8/31/2023 10:00 AM Saals Ross NP [facility] [place]   9/26/2023 10:30 AM Bronwyn Hdz MD [facility] [place]   10/9/2023  9:30 AM Bronwyn Hdz MD [facility] [place]   12/5/2023  9:00 AM Wilton Welch MD [facility] [place]        There are no Patient Instructions on file for this visit.  No follow-ups on file.     Signature:  Bronwyn Hdz MD      Date of encounter: 7/27/23

## 2023-08-03 DIAGNOSIS — J45.909 ASTHMA DUE TO ENVIRONMENTAL ALLERGIES: Primary | ICD-10-CM

## 2023-08-03 RX ORDER — FLUTICASONE PROPIONATE 50 MCG
SPRAY, SUSPENSION (ML) NASAL
Qty: 96 ML | Refills: 1 | Status: SHIPPED | OUTPATIENT
Start: 2023-08-03 | End: 2024-02-02 | Stop reason: SDUPTHER

## 2023-08-09 ENCOUNTER — PATIENT OUTREACH (OUTPATIENT)
Dept: ADMINISTRATIVE | Facility: HOSPITAL | Age: 73
End: 2023-08-09
Payer: MEDICARE

## 2023-08-25 DIAGNOSIS — M48.061 SPINAL STENOSIS OF LUMBAR REGION, UNSPECIFIED WHETHER NEUROGENIC CLAUDICATION PRESENT: Primary | ICD-10-CM

## 2023-08-25 DIAGNOSIS — M54.9 CHRONIC BACK PAIN GREATER THAN 3 MONTHS DURATION: ICD-10-CM

## 2023-08-25 DIAGNOSIS — M54.16 LUMBAR RADICULOPATHY: ICD-10-CM

## 2023-08-25 DIAGNOSIS — G89.29 CHRONIC BACK PAIN GREATER THAN 3 MONTHS DURATION: ICD-10-CM

## 2023-08-25 NOTE — PROGRESS NOTES
Zach Rodgers  Please contact patient to find out chiropracter he is currently seeing so we can send referral I placed to them. He wants to see if his insurance will help him cover some of the expense.  Thanks

## 2023-08-28 ENCOUNTER — TELEPHONE (OUTPATIENT)
Dept: FAMILY MEDICINE | Facility: CLINIC | Age: 73
End: 2023-08-28
Payer: MEDICARE

## 2023-08-28 DIAGNOSIS — J44.1 COPD WITH ACUTE EXACERBATION: ICD-10-CM

## 2023-08-28 DIAGNOSIS — M54.9 CHRONIC BACK PAIN GREATER THAN 3 MONTHS DURATION: ICD-10-CM

## 2023-08-28 DIAGNOSIS — G89.29 CHRONIC BACK PAIN GREATER THAN 3 MONTHS DURATION: ICD-10-CM

## 2023-08-28 RX ORDER — GABAPENTIN 100 MG/1
100 CAPSULE ORAL DAILY
Qty: 30 CAPSULE | Refills: 6 | Status: SHIPPED | OUTPATIENT
Start: 2023-08-28 | End: 2023-10-04 | Stop reason: SDUPTHER

## 2023-08-28 RX ORDER — FLUTICASONE FUROATE, UMECLIDINIUM BROMIDE AND VILANTEROL TRIFENATATE 200; 62.5; 25 UG/1; UG/1; UG/1
1 POWDER RESPIRATORY (INHALATION) DAILY
Qty: 60 EACH | Refills: 11 | Status: SHIPPED | OUTPATIENT
Start: 2023-08-28 | End: 2024-02-02 | Stop reason: SDUPTHER

## 2023-08-28 NOTE — TELEPHONE ENCOUNTER
----- Message from April Stewart sent at 8/28/2023  3:34 PM CDT -----  Regarding: med refill  .Type:  RX Refill Request    Who Called:  patient  Refill or New Rx: refill  RX Name and Strength: fluticasone-umeclidin-vilanter (TRELEGY ELLIPTA) 200-62.5-25 mcg inhaler  How is the patient currently taking it? (ex. 1XDay): 1x daily  Is this a 30 day or 90 day RX: 60  Preferred Pharmacy with phone number: [facility]/pharmacy #[id number] - [place], [place] - [address] [facility]  Local or Mail Order: local  Ordering Provider: Rebekah  Would the patient rather a call back or a response via [facility]?  Call back  Best Call Back Number: [phone]  Additional Information: patient is requesting a refill.    .Type:  RX Refill Request    Who Called:  patient  Refill or New Rx: refill  RX Name and Strength: gabapentin (NEURONTIN) 100 MG capsule  How is the patient currently taking it? (ex. 1XDay): 1x daily  Is this a 30 day or 90 day RX: 30  Preferred Pharmacy with phone number: [facility]/pharmacy #[id number] - [place], [place] - [address] AT [facility]  Local or Mail Order: local  Ordering Provider: Rebekah  Would the patient rather a call back or a response via [facility]?  Call back  Best Call Back Number: [phone]  Additional Information: patient is requesting a refill.

## 2023-08-31 ENCOUNTER — OFFICE VISIT (OUTPATIENT)
Dept: PAIN MEDICINE | Facility: CLINIC | Age: 73
End: 2023-08-31
Payer: MEDICARE

## 2023-08-31 VITALS
HEART RATE: 70 BPM | DIASTOLIC BLOOD PRESSURE: 65 MMHG | TEMPERATURE: 98 F | HEIGHT: 62 IN | BODY MASS INDEX: 27.6 KG/M2 | WEIGHT: 150 LBS | SYSTOLIC BLOOD PRESSURE: 130 MMHG

## 2023-08-31 DIAGNOSIS — M54.16 LUMBAR RADICULOPATHY: Primary | ICD-10-CM

## 2023-08-31 PROCEDURE — 99213 OFFICE O/P EST LOW 20 MIN: CPT | Mod: ,,, | Performed by: NURSE PRACTITIONER

## 2023-08-31 PROCEDURE — 99213 PR OFFICE/OUTPT VISIT, EST, LEVL III, 20-29 MIN: ICD-10-PCS | Mod: ,,, | Performed by: NURSE PRACTITIONER

## 2023-08-31 NOTE — PROGRESS NOTES
ADMISSION HISTORY & PHYSICAL    SUBJECTIVE:    CHIEF COMPLAINT: Back Pain (Preop for TFESI Oliver L3 on 9/13/23. Seeing Chiropractor an he has been doing good. Not taking anything for pain. Home exercise which helps. Pain level today is 1/10.)       History of Present Illness: 73 y.o. male presents today for preoperative evaluation for bilateral L3 transforaminal epidural steroid injection on 09/13/2023. I reviewed the indications for procedure. The risks and benefits of the proposed and alternative treatments were discussed with the patient. Questions pertinent to the procedure were solicited and answered. No assurances were given. Informed consent was obtained. The patient expressed good understanding and wished to proceed with scheduling the procedure.     Review of Systems:   Constitutional: No fever, weakness, or fatigue.   Ear/Nose/Mouth/Throat: No nasal congestion or sore throat.   Respiratory: No shortness of breath or cough.   Cardiovascular: No chest pain, palpitations, or peripheral edema.   Gastrointestinal: No nausea, vomiting, or abdominal pain.   Genitourinary: No dysuria.  Musculoskeletal: minimal to no low back . No leg pain    Past Surgical History:   Procedure Laterality Date    COLONOSCOPY      COLONOSCOPY  05/22/2018    Kenny Baker MD    control bleeding      of gastrointestinal tract    ESOPHAGOGASTRODUODENOSCOPY      HEMORRHOID SURGERY      HERNIA REPAIR      INJECTION OF FACET JOINT Bilateral     SCLEROTHERAPY WITH ULTRASOUND GUIDANCE      TOTAL HIP ARTHROPLASTY Right     TOTAL HIP ARTHROPLASTY Left     TRANSFORAMINAL EPIDURAL INJECTION OF STEROID Bilateral 05/04/2022    Procedure: INJECTION, STEROID, EPIDURAL, TRANSFORAMINAL APPROACH L3;  Surgeon: Bronwyn Hdz MD;  Location: [facility];  Service: Pain Management;  Laterality: Bilateral;    TRANSFORAMINAL EPIDURAL INJECTION OF STEROID Bilateral 09/14/2022    Procedure: Injection,steroid,epidural,transforaminal approach;  Surgeon:  Bronwyn Hdz MD;  Location: [facility] OR;  Service: Pain Management;  Laterality: Bilateral;  L3    TRANSFORAMINAL EPIDURAL INJECTION OF STEROID Bilateral 10/26/2022    Procedure: Injection,steroid,epidural,transforaminal approach;  Surgeon: Bronwyn Hdz MD;  Location: [facility] OR;  Service: Pain Management;  Laterality: Bilateral;  L3    TRANSFORAMINAL EPIDURAL INJECTION OF STEROID Bilateral 03/15/2023    Procedure: Injection,steroid,epidural,transforaminal approach;  Surgeon: Bronwyn Hdz MD;  Location: [facility] OR;  Service: Pain Management;  Laterality: Bilateral;  L3        Past Medical History:   Diagnosis Date    Actinic keratosis     Arthritis     hip    Asthma     Chronic back pain     COPD (chronic obstructive pulmonary disease)     Dyslipidemia     Elevated PSA measurement     Lower extremity weakness     Lumbar radiculitis     Lumbar spinal stenosis     Mixed hyperlipidemia     Nocturia     Radicular low back pain     Skin lesion     Solar elastosis     Umbilical hernia         OBJECTIVE:    Vitals:    08/31/23 0958   BP: 130/65   Pulse: 70   Temp: 98.1 °F (36.7 °C)             Physical Exam:   General: Well-developed, well-nourished.Pleasant. Loquacious  Neuro: Alert and oriented x 3.  Psych: Normal mood and affect.  HEENT: Normocephalic. PERRLA EOM intact. Nose and throat clear.  Lungs: Clear to auscultation and percussion.  Heart: Regular rate and rhythm   Abdomen: Soft non-tender. Bowel sounds positive. No rebound tenderness.  Skin: No rashes or open wounds  Musculoskeletal:  Negative SLR. No pain to bilateral low back or buttocks. Normal ROM with lumbar extension, flexion and lateral rotation. Negative  straight leg raise.  Tandem gait    ASSESSMENT:  1. Lumbar radiculopathy       PLAN:  Plan for to proceed with bilateral L3 transforaminal epidural steroid injection on 09/13/2023..    ADDENDUM: Prior to this office visit, he has completed weekly Chiropractic visits since April and has noted excellent  progression of pain control. Since these adjustments he no longer has leg pain and minimal low back pain. Due to this, he is cancelling his injection and continue his Chiropractic visits. This information was relayed to staff to cancel procedure

## 2023-09-08 ENCOUNTER — PATIENT MESSAGE (OUTPATIENT)
Dept: ADMINISTRATIVE | Facility: OTHER | Age: 73
End: 2023-09-08
Payer: MEDICARE

## 2023-10-04 DIAGNOSIS — G89.29 CHRONIC BACK PAIN GREATER THAN 3 MONTHS DURATION: ICD-10-CM

## 2023-10-04 DIAGNOSIS — M54.9 CHRONIC BACK PAIN GREATER THAN 3 MONTHS DURATION: ICD-10-CM

## 2023-10-04 RX ORDER — PROMETHAZINE HYDROCHLORIDE AND DEXTROMETHORPHAN HYDROBROMIDE 6.25; 15 MG/5ML; MG/5ML
5 SYRUP ORAL NIGHTLY PRN
Qty: 118 ML | Refills: 0 | Status: SHIPPED | OUTPATIENT
Start: 2023-10-04 | End: 2023-10-14

## 2023-10-04 RX ORDER — GABAPENTIN 100 MG/1
100 CAPSULE ORAL DAILY
Qty: 30 CAPSULE | Refills: 6 | Status: SHIPPED | OUTPATIENT
Start: 2023-10-04 | End: 2023-11-09 | Stop reason: SDUPTHER

## 2023-11-09 ENCOUNTER — TELEPHONE (OUTPATIENT)
Dept: FAMILY MEDICINE | Facility: CLINIC | Age: 73
End: 2023-11-09
Payer: MEDICARE

## 2023-11-09 DIAGNOSIS — M54.9 CHRONIC BACK PAIN GREATER THAN 3 MONTHS DURATION: ICD-10-CM

## 2023-11-09 DIAGNOSIS — E78.2 MIXED HYPERLIPIDEMIA: ICD-10-CM

## 2023-11-09 DIAGNOSIS — G89.29 CHRONIC BACK PAIN GREATER THAN 3 MONTHS DURATION: ICD-10-CM

## 2023-11-09 RX ORDER — ATORVASTATIN CALCIUM 10 MG/1
10 TABLET, FILM COATED ORAL NIGHTLY
Qty: 90 TABLET | Refills: 3 | Status: SHIPPED | OUTPATIENT
Start: 2023-11-09

## 2023-11-09 RX ORDER — GABAPENTIN 100 MG/1
100 CAPSULE ORAL DAILY
Qty: 90 CAPSULE | Refills: 3 | Status: SHIPPED | OUTPATIENT
Start: 2023-11-09 | End: 2023-12-28

## 2023-11-09 NOTE — TELEPHONE ENCOUNTER
----- Message from Wade Ahn sent at 11/9/2023 11:23 AM CST -----  .Type:  RX Refill Request    Who Called: Reji  Refill or New Rx: Refill   RX Name and Strength: gabapentin (NEURONTIN) 100 MG capsule and atorvastatin (LIPITOR) 10 MG tablet  How is the patient currently taking it? (ex. 1XDay):  Is this a 30 day or 90 day RX:  Preferred Pharmacy with phone number: [facility]  [place]   Local or Mail Order: Local   Ordering Provider: Rebekah   Would the patient rather a call back or a response via [facility]?   Best Call Back Number: [phone]  Additional Information: Please call with any questions.

## 2023-11-29 ENCOUNTER — LAB VISIT (OUTPATIENT)
Dept: LAB | Facility: HOSPITAL | Age: 73
End: 2023-11-29
Attending: INTERNAL MEDICINE
Payer: MEDICARE

## 2023-11-29 DIAGNOSIS — J44.9 MODERATE COPD (CHRONIC OBSTRUCTIVE PULMONARY DISEASE): ICD-10-CM

## 2023-11-29 DIAGNOSIS — E78.2 MIXED HYPERLIPIDEMIA: ICD-10-CM

## 2023-11-29 DIAGNOSIS — Z00.00 WELLNESS EXAMINATION: ICD-10-CM

## 2023-11-29 DIAGNOSIS — Z12.5 SCREENING FOR MALIGNANT NEOPLASM OF PROSTATE: ICD-10-CM

## 2023-11-29 DIAGNOSIS — R97.20 ELEVATED PSA: ICD-10-CM

## 2023-11-29 LAB
ALBUMIN SERPL-MCNC: 4 G/DL (ref 3.4–4.8)
ALBUMIN/GLOB SERPL: 1.1 RATIO (ref 1.1–2)
ALP SERPL-CCNC: 88 UNIT/L (ref 40–150)
ALT SERPL-CCNC: 15 UNIT/L (ref 0–55)
APPEARANCE UR: CLEAR
AST SERPL-CCNC: 21 UNIT/L (ref 5–34)
BASOPHILS # BLD AUTO: 0.04 X10(3)/MCL
BASOPHILS NFR BLD AUTO: 0.5 %
BILIRUB SERPL-MCNC: 0.7 MG/DL
BILIRUB UR QL STRIP.AUTO: NEGATIVE
BUN SERPL-MCNC: 13.9 MG/DL (ref 8.4–25.7)
CALCIUM SERPL-MCNC: 9.5 MG/DL (ref 8.8–10)
CHLORIDE SERPL-SCNC: 108 MMOL/L (ref 98–107)
CHOLEST SERPL-MCNC: 193 MG/DL
CHOLEST/HDLC SERPL: 3 {RATIO} (ref 0–5)
CO2 SERPL-SCNC: 24 MMOL/L (ref 23–31)
COLOR UR AUTO: YELLOW
CREAT SERPL-MCNC: 0.91 MG/DL (ref 0.73–1.18)
EOSINOPHIL # BLD AUTO: 0.2 X10(3)/MCL (ref 0–0.9)
EOSINOPHIL NFR BLD AUTO: 2.7 %
ERYTHROCYTE [DISTWIDTH] IN BLOOD BY AUTOMATED COUNT: 14.4 % (ref 11.5–17)
GFR SERPLBLD CREATININE-BSD FMLA CKD-EPI: >60 MLS/MIN/1.73/M2
GLOBULIN SER-MCNC: 3.6 GM/DL (ref 2.4–3.5)
GLUCOSE SERPL-MCNC: 110 MG/DL (ref 82–115)
GLUCOSE UR QL STRIP.AUTO: NEGATIVE
HCT VFR BLD AUTO: 47.9 % (ref 42–52)
HDLC SERPL-MCNC: 58 MG/DL (ref 35–60)
HGB BLD-MCNC: 16.3 G/DL (ref 14–18)
IMM GRANULOCYTES # BLD AUTO: 0.01 X10(3)/MCL (ref 0–0.04)
IMM GRANULOCYTES NFR BLD AUTO: 0.1 %
KETONES UR QL STRIP.AUTO: ABNORMAL
LDLC SERPL CALC-MCNC: 111 MG/DL (ref 50–140)
LEUKOCYTE ESTERASE UR QL STRIP.AUTO: NEGATIVE
LYMPHOCYTES # BLD AUTO: 2.39 X10(3)/MCL (ref 0.6–4.6)
LYMPHOCYTES NFR BLD AUTO: 32.3 %
MCH RBC QN AUTO: 30.8 PG (ref 27–31)
MCHC RBC AUTO-ENTMCNC: 34 G/DL (ref 33–36)
MCV RBC AUTO: 90.4 FL (ref 80–94)
MONOCYTES # BLD AUTO: 0.71 X10(3)/MCL (ref 0.1–1.3)
MONOCYTES NFR BLD AUTO: 9.6 %
NEUTROPHILS # BLD AUTO: 4.05 X10(3)/MCL (ref 2.1–9.2)
NEUTROPHILS NFR BLD AUTO: 54.8 %
NITRITE UR QL STRIP.AUTO: NEGATIVE
NRBC BLD AUTO-RTO: 0 %
PH UR STRIP.AUTO: 6 [PH]
PLATELET # BLD AUTO: 200 X10(3)/MCL (ref 130–400)
PMV BLD AUTO: 10.5 FL (ref 7.4–10.4)
POTASSIUM SERPL-SCNC: 3.8 MMOL/L (ref 3.5–5.1)
PROT SERPL-MCNC: 7.6 GM/DL (ref 5.8–7.6)
PROT UR QL STRIP.AUTO: NEGATIVE
PSA SERPL-MCNC: 0.93 NG/ML
RBC # BLD AUTO: 5.3 X10(6)/MCL (ref 4.7–6.1)
RBC UR QL AUTO: NEGATIVE
SODIUM SERPL-SCNC: 140 MMOL/L (ref 136–145)
SP GR UR STRIP.AUTO: 1.02 (ref 1–1.03)
TRIGL SERPL-MCNC: 119 MG/DL (ref 34–140)
UROBILINOGEN UR STRIP-ACNC: 0.2
VLDLC SERPL CALC-MCNC: 24 MG/DL
WBC # SPEC AUTO: 7.4 X10(3)/MCL (ref 4.5–11.5)

## 2023-11-29 PROCEDURE — 80061 LIPID PANEL: CPT

## 2023-11-29 PROCEDURE — 84153 ASSAY OF PSA TOTAL: CPT

## 2023-11-29 PROCEDURE — 81003 URINALYSIS AUTO W/O SCOPE: CPT

## 2023-11-29 PROCEDURE — 80053 COMPREHEN METABOLIC PANEL: CPT

## 2023-11-29 PROCEDURE — 36415 COLL VENOUS BLD VENIPUNCTURE: CPT

## 2023-11-29 PROCEDURE — 85025 COMPLETE CBC W/AUTO DIFF WBC: CPT

## 2023-12-05 ENCOUNTER — OFFICE VISIT (OUTPATIENT)
Dept: FAMILY MEDICINE | Facility: CLINIC | Age: 73
End: 2023-12-05
Payer: MEDICARE

## 2023-12-05 VITALS
HEIGHT: 62 IN | DIASTOLIC BLOOD PRESSURE: 66 MMHG | BODY MASS INDEX: 26.87 KG/M2 | WEIGHT: 146 LBS | SYSTOLIC BLOOD PRESSURE: 118 MMHG | OXYGEN SATURATION: 97 % | RESPIRATION RATE: 16 BRPM | TEMPERATURE: 98 F | HEART RATE: 68 BPM

## 2023-12-05 DIAGNOSIS — M54.16 LUMBAR RADICULOPATHY: ICD-10-CM

## 2023-12-05 DIAGNOSIS — J44.9 MODERATE COPD (CHRONIC OBSTRUCTIVE PULMONARY DISEASE): ICD-10-CM

## 2023-12-05 DIAGNOSIS — G89.29 CHRONIC BACK PAIN GREATER THAN 3 MONTHS DURATION: ICD-10-CM

## 2023-12-05 DIAGNOSIS — E78.2 MIXED HYPERLIPIDEMIA: ICD-10-CM

## 2023-12-05 DIAGNOSIS — M51.36 LUMBAR DEGENERATIVE DISC DISEASE: ICD-10-CM

## 2023-12-05 DIAGNOSIS — M54.9 CHRONIC BACK PAIN GREATER THAN 3 MONTHS DURATION: ICD-10-CM

## 2023-12-05 DIAGNOSIS — Z00.00 WELLNESS EXAMINATION: Primary | ICD-10-CM

## 2023-12-05 PROBLEM — R97.20 ELEVATED PSA: Status: RESOLVED | Noted: 2022-11-28 | Resolved: 2023-12-05

## 2023-12-05 PROCEDURE — G0439 PR MEDICARE ANNUAL WELLNESS SUBSEQUENT VISIT: ICD-10-PCS | Mod: ,,, | Performed by: INTERNAL MEDICINE

## 2023-12-05 PROCEDURE — G0439 PPPS, SUBSEQ VISIT: HCPCS | Mod: ,,, | Performed by: INTERNAL MEDICINE

## 2023-12-05 NOTE — PROGRESS NOTES
Patient ID: [id number]     Chief Complaint: Medicare AWV      HPI:     Reji Dozier is a 73 y.o. male here today for a Medicare Wellness.     COPD: compliant with Trelegy, sx controlled    HLD: compliant with statin, no myalgias    Lower back pain and RLE radiculopathy:  S/p FABY with Dr. Hdz and goes to chiropracter PRN  Patient says he cancelled last injection since it didn't work  He is asking me today about other options for control such as  possible surgery if needed      Elevated PSA: last OV note reviewed Dr. [facility] urology, patient says at last OV was told his prostate biospy samples was normal and some confusion about 6 month or 1 year follow up   Repeat PSA now <1    flu shot: due at local pharmacy 2023  COVID 19 vaccine: x 3 doses  Pneumovax and Prevnar completed  RSV education provided     Opioid Screening: Patient medication list reviewed, patient is not taking prescription opioids. Patient is not using additional opioids than prescribed. Patient is at low risk of substance abuse based on this opioid use history.       ----------------------------  Actinic keratosis  Arthritis      Comment:  hip  Asthma  Chronic back pain  COPD (chronic obstructive pulmonary disease)  Dyslipidemia  Elevated PSA measurement  Lower extremity weakness  Lumbar radiculitis  Lumbar spinal stenosis  Mixed hyperlipidemia  Nocturia  Radicular low back pain  Skin lesion  Solar elastosis  Umbilical hernia     Past Surgical History:   Procedure Laterality Date    COLONOSCOPY      COLONOSCOPY  05/22/2018    Kenny Baker MD    control bleeding      of gastrointestinal tract    ESOPHAGOGASTRODUODENOSCOPY      HEMORRHOID SURGERY      HERNIA REPAIR      INJECTION OF FACET JOINT Bilateral     SCLEROTHERAPY WITH ULTRASOUND GUIDANCE      TOTAL HIP ARTHROPLASTY Right     TOTAL HIP ARTHROPLASTY Left     TRANSFORAMINAL EPIDURAL INJECTION OF STEROID Bilateral 05/04/2022    Procedure: INJECTION, STEROID,  EPIDURAL, TRANSFORAMINAL APPROACH L3;  Surgeon: Bronwyn Hdz MD;  Location: [facility] OR;  Service: Pain Management;  Laterality: Bilateral;    TRANSFORAMINAL EPIDURAL INJECTION OF STEROID Bilateral 09/14/2022    Procedure: Injection,steroid,epidural,transforaminal approach;  Surgeon: Bronwyn Hdz MD;  Location: [facility] OR;  Service: Pain Management;  Laterality: Bilateral;  L3    TRANSFORAMINAL EPIDURAL INJECTION OF STEROID Bilateral 10/26/2022    Procedure: Injection,steroid,epidural,transforaminal approach;  Surgeon: Bronwyn Hdz MD;  Location: [facility] OR;  Service: Pain Management;  Laterality: Bilateral;  L3    TRANSFORAMINAL EPIDURAL INJECTION OF STEROID Bilateral 03/15/2023    Procedure: Injection,steroid,epidural,transforaminal approach;  Surgeon: Bronwyn Hdz MD;  Location: [facility] OR;  Service: Pain Management;  Laterality: Bilateral;  L3       Review of patient's allergies indicates:  No Known Allergies    Outpatient Medications Marked as Taking for the 12/5/23 encounter (Office Visit) with Wilton Welch MD   Medication Sig Dispense Refill    acetaminophen (TYLENOL) 500 MG tablet Take 1,000 mg by mouth every 6 (six) hours as needed for Pain.      albuterol (PROVENTIL/VENTOLIN HFA) 90 mcg/actuation inhaler Inhale 2 puffs into the lungs every 6 (six) hours as needed for Wheezing. 18 g 3    ascorbic acid, vitamin C, 250 mg Chew Take 1 tablet by mouth once daily.      aspirin/salicylamide/caffeine (BC HEADACHE POWDER ORAL) Take 1 tablet by mouth as needed.      atorvastatin (LIPITOR) 10 MG tablet Take 1 tablet (10 mg total) by mouth every evening. 90 tablet 3    fluticasone propionate (FLONASE) 50 mcg/actuation nasal spray USE 2 SPRAYS INTO EACH NOSTRIL TWICE A DAY (Patient taking differently: 1 spray by Each Nostril route Daily. USE 2 SPRAYS INTO EACH NOSTRIL TWICE A DAY) 96 mL 1    fluticasone-umeclidin-vilanter (TRELEGY ELLIPTA) 200-62.5-25 mcg inhaler Inhale 1 puff into the lungs once daily. 60 each  11    gabapentin (NEURONTIN) 100 MG capsule Take 1 capsule (100 mg total) by mouth once daily. 90 capsule 3    ipratropium (ATROVENT) 0.02 % nebulizer solution Take 2.5 mLs (500 mcg total) by nebulization 3 (three) times daily. (Patient taking differently: Take 500 mcg by nebulization daily as needed.) 90 mL 3    multivitamin capsule Take 1 capsule by mouth once daily.      ondansetron (ZOFRAN) 4 MG tablet Take 4 mg by mouth as needed.      tiZANidine (ZANAFLEX) 2 MG tablet TAKE 1 TABLET BY MOUTH EVERY 8 HOURS AS NEEDED FOR MUSCLE SPASM (Patient taking differently: Take 2 mg by mouth Daily.) 90 tablet 1    vitamin D (VITAMIN D3) 1000 units Tab Take 1,000 Units by mouth once daily.         Social History     Socioeconomic History    Marital status:    Tobacco Use    Smoking status: Former     Types: Cigarettes    Smokeless tobacco: Never   Substance and Sexual Activity    Alcohol use: Yes     Alcohol/week: 1.0 standard drink of alcohol     Types: 1 Cans of beer per week     Comment: daily    Drug use: Never    Sexual activity: Yes        Family History   Family history unknown: Yes        Patient Care Team:  Wilton Welch MD as PCP - General (Internal Medicine)  Wilotn Welch MD       Subjective:     Review of Systems   Constitutional:  Negative for chills and fever.   Cardiovascular:  Negative for chest pain.   Gastrointestinal:  Negative for abdominal pain.         Patient Reported Health Risk Assessment  What [age] 70-80  Are you male or female?: Male  During the past four weeks, how much have you been bothered by emotional problems such as feeling anxious, depressed, irritable, sad, or downhearted and blue?: Not at all  During the past five weeks, has your physical and/or emotional health limited your social activities with family, friends, neighbors, or groups?: Not at all  During the past four weeks, how much bodily pain have you generally had?: Very mild pain  During the past four weeks,  was someone available to help if you needed and wanted help?: Yes, as much as I wanted  During the past four weeks, what was the hardest physical activity you could do for at least two minutes?: Moderate  Can you get to places out of walking distance without help?  (For example, can you travel alone on buses or taxis, or drive your own car?): Yes  Can you go shopping for groceries or clothes without someone's help?: Yes  Can you prepare your own meals?: Yes  Can you do your own housework without help?: Yes  Because of any health problems, do you need the help of another person with your personal care needs such as eating, bathing, dressing, or getting around the house?: No  Can you handle your own money without help?: Yes  During the past four weeks, how would you rate your health in general?: Good  How have things been going for you during the past four weeks?: Pretty well  Are you having difficulties driving your car?: No  Do you always fasten your seat belt when you are in a car?: Yes, usually  How often in the past four weeks have you been bothered by falling or dizzy when standing up?: Seldom  How often in the past four weeks have you been bothered by sexual problems?: Never  How often in the past four weeks have you been bothered by trouble eating well?: Never  How often in the past four weeks have you been bothered by teeth or denture problems?: Never  How often in the past four weeks have you been bothered with problems using the telephone?: Never  How often in the past four weeks have you been bothered by tiredness or fatigue?: Seldom  Have you fallen two or more times in the past year?: No  Are you afraid of falling?: No  Are you a smoker?: No  During the past four weeks, how many drinks of wine, beer, or other alcoholic beverages did you have?: 6-9 drinks per week  Do you exercise for about 20 minutes three or more days a week?: Yes, some of the time  Have you been given any information to help you with  "hazards in your house that might hurt you?: Yes  Have you been given any information to help you with keeping track of your medications?: Yes  How often do you have trouble taking medicines the way you've been told to take them?: I always take them as prescribed  How confident are you that you can control and manage most of your health problems?: Very confident  What is your race? (Check all that apply.):     Objective:     /66 (BP Location: Left arm, Patient Position: Sitting, BP Method: Large (Automatic))   Pulse 68   Temp 98.3 °F (36.8 °C) (Temporal)   Resp 16   Ht 5' 2" (1.575 m)   Wt 66.2 kg (146 lb)   SpO2 97%   BMI 26.70 kg/m²     Physical Exam  Vitals and nursing note reviewed.   Constitutional:       General: He is not in acute distress.     Appearance: He is well-developed. He is not diaphoretic.   HENT:      Head: Normocephalic and atraumatic.   Eyes:      General:         Right eye: No discharge.         Left eye: No discharge.      Conjunctiva/sclera: Conjunctivae normal.      Pupils: Pupils are equal, round, and reactive to light.   Neck:      Thyroid: No thyromegaly.   Cardiovascular:      Rate and Rhythm: Normal rate and regular rhythm.      Heart sounds: Normal heart sounds. No murmur heard.  Pulmonary:      Effort: Pulmonary effort is normal. No respiratory distress.      Breath sounds: Normal breath sounds. No wheezing or rales.   Abdominal:      General: There is no distension.      Palpations: Abdomen is soft.      Tenderness: There is no abdominal tenderness.   Musculoskeletal:      Cervical back: Normal range of motion and neck supple.   Lymphadenopathy:      Cervical: No cervical adenopathy.   Skin:     General: Skin is warm and dry.   Neurological:      Mental Status: He is alert and oriented to person, place, and time.   Psychiatric:         Mood and Affect: Mood normal.         Behavior: Behavior normal.                No data to display                  12/5/2023     " 9:00 AM 8/31/2023    10:00 AM 7/27/2023    11:45 AM 5/29/2023     9:00 AM 4/6/2023    11:30 AM 2/24/2023    11:00 AM 1/23/2023    11:45 AM   Fall Risk Assessment - Outpatient   Mobility Status Ambulatory Ambulatory Ambulatory Ambulatory Ambulatory Ambulatory Ambulatory   Number of falls 0 0 0 0 0 0 0   Identified as fall risk False False False False False False False           Depression Screening  Over the past two weeks, has the patient felt down, depressed, or hopeless?: No  Over the past two weeks, has the patient felt little interest or pleasure in doing things?: No  Functional Ability/Safety Screening  Was the patient's timed Up & Go test unsteady or longer than 30 seconds?: No  Does the patient need help with phone, transportation, shopping, preparing meals, housework, laundry, meds, or managing money?: No  Does the patient's home have rugs in the hallway, lack grab bars in the bathroom, lack handrails on the stairs or have poor lighting?: No  Have you noticed any hearing difficulties?: No  Cognitive Function (Assessed through direct observation with due consideration of information obtained by way of patient reports and/or concerns raised by family, friends, caretakers, or others)    Does the patient repeat questions/statements in the same day?: No  Does the patient have trouble remembering the date, year, and time?: No  Does the patient have difficulty managing finances?: No  Does the patient have a decreased sense of direction?: No  Assessment/Plan:     1. Wellness examination  Fasting labs reviewed  RSV vaccine education provided  2. Lumbar radiculopathy  -     Ambulatory referral/consult to Pain Clinic; Future; Expected date: 12/12/2023  -     Ambulatory referral/consult to Neurosurgery; Future; Expected date: 12/12/2023  Sx are uncontrolled  Referral to Dr. George- would like his opinion on add'l outpatient pain management treatment for him; and also to Dr. Joshua for surgery consult   3. Lumbar  degenerative disc disease  -     Ambulatory referral/consult to Pain Clinic; Future; Expected date: 12/12/2023  -     Ambulatory referral/consult to Neurosurgery; Future; Expected date: 12/12/2023  Sx are uncontrolled  Referral to Dr. George- would like his opinion on add'l outpatient pain management treatment for him; and also to Dr. Joshua for surgery consult   4. Chronic back pain greater than 3 months duration  -     Ambulatory referral/consult to Pain Clinic; Future; Expected date: 12/12/2023  -     Ambulatory referral/consult to Neurosurgery; Future; Expected date: 12/12/2023  Sx are uncontrolled  Referral to Dr. George- would like his opinion on add'l outpatient pain management treatment for him; and also to Dr. Joshua for surgery consult   5. Mixed hyperlipidemia  LDL at goal  Continue statin  6. Moderate COPD (chronic obstructive pulmonary disease)  Stable continue trelegy Medicare Annual Wellness and Personalized Prevention Plan:   Fall Risk + Home Safety + Hearing Impairment + Depression Screen + Opioid and Substance Abuse Screening + Cognitive Impairment Screen + Health Risk Assessment all reviewed.     Health Maintenance Topics with due status: Not Due       Topic Last Completion Date    Colorectal Cancer Screening 05/22/2018    Lipid Panel 11/29/2023      The patient's Health Maintenance was reviewed and the following appears to be due at this time:   Health Maintenance Due   Topic Date Due    Shingles Vaccine (1 of 2) Never done    RSV Vaccine (Age 60+ and Pregnant patients) (1 - 1-dose 60+ series) Never done    TETANUS VACCINE  11/01/2021    COVID-19 Vaccine (4 - 2023-24 season) 09/01/2023       Advance Care Planning     Date: 12/05/2023  HCPOA and living will done and scanned into system         Follow up in about 6 months (around 6/5/2024) for Follow Up - Chronic Conditions. In addition to their scheduled follow up, the patient has also been instructed to follow up on as needed basis.

## 2023-12-06 ENCOUNTER — TELEPHONE (OUTPATIENT)
Dept: NEUROSURGERY | Facility: CLINIC | Age: 73
End: 2023-12-06
Payer: MEDICARE

## 2023-12-06 NOTE — TELEPHONE ENCOUNTER
Spoke to patient regarding his referral from Dr. Welch for lumbar radiculopathy to Dr. Joshua. He states that the initial pain started approx in 2013 when he was working to [employer] and progressively got worse over the years. Pain is in the lower back with numbness to BLE and when turning to the left or the right he gets a shooting pain. He previously had an MRI done in 2022 and more recently XR done at [facility]. He saw Dr. Hdz in the past and had injections that seem to only help for a month. He sees a chiropractor, Tomasz Rivera also. He was prescribed gabapentin 200mg that he takes daily or he takes OTC Excedrin and drinks a beer to help. No prior surgeries. I scheduled patient to see Erin on 12/11 at 11:00am.
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
## 2023-12-11 ENCOUNTER — OFFICE VISIT (OUTPATIENT)
Dept: NEUROSURGERY | Facility: CLINIC | Age: 73
End: 2023-12-11
Payer: MEDICARE

## 2023-12-11 VITALS
HEIGHT: 62 IN | DIASTOLIC BLOOD PRESSURE: 85 MMHG | RESPIRATION RATE: 16 BRPM | HEART RATE: 59 BPM | WEIGHT: 142 LBS | BODY MASS INDEX: 26.13 KG/M2 | SYSTOLIC BLOOD PRESSURE: 131 MMHG

## 2023-12-11 DIAGNOSIS — M48.062 SPINAL STENOSIS OF LUMBAR REGION WITH NEUROGENIC CLAUDICATION: Primary | ICD-10-CM

## 2023-12-11 DIAGNOSIS — M43.16 SPONDYLOLISTHESIS OF LUMBAR REGION: ICD-10-CM

## 2023-12-11 DIAGNOSIS — M54.9 CHRONIC BACK PAIN GREATER THAN 3 MONTHS DURATION: ICD-10-CM

## 2023-12-11 DIAGNOSIS — M51.36 LUMBAR DEGENERATIVE DISC DISEASE: ICD-10-CM

## 2023-12-11 DIAGNOSIS — G89.29 CHRONIC BACK PAIN GREATER THAN 3 MONTHS DURATION: ICD-10-CM

## 2023-12-11 DIAGNOSIS — M54.2 CERVICALGIA: ICD-10-CM

## 2023-12-11 PROCEDURE — 99215 OFFICE O/P EST HI 40 MIN: CPT | Mod: ,,, | Performed by: NURSE PRACTITIONER

## 2023-12-11 PROCEDURE — 99215 PR OFFICE/OUTPT VISIT, EST, LEVL V, 40-54 MIN: ICD-10-PCS | Mod: ,,, | Performed by: NURSE PRACTITIONER

## 2023-12-14 ENCOUNTER — HOSPITAL ENCOUNTER (OUTPATIENT)
Dept: RADIOLOGY | Facility: HOSPITAL | Age: 73
Discharge: HOME | End: 2023-12-14
Attending: NURSE PRACTITIONER
Payer: MEDICARE

## 2023-12-14 ENCOUNTER — TELEPHONE (OUTPATIENT)
Dept: NEUROSURGERY | Facility: CLINIC | Age: 73
End: 2023-12-14
Payer: MEDICARE

## 2023-12-14 DIAGNOSIS — G89.29 CHRONIC BACK PAIN GREATER THAN 3 MONTHS DURATION: ICD-10-CM

## 2023-12-14 DIAGNOSIS — M48.062 SPINAL STENOSIS OF LUMBAR REGION WITH NEUROGENIC CLAUDICATION: ICD-10-CM

## 2023-12-14 DIAGNOSIS — M54.9 CHRONIC BACK PAIN GREATER THAN 3 MONTHS DURATION: ICD-10-CM

## 2023-12-14 DIAGNOSIS — M54.2 CERVICALGIA: ICD-10-CM

## 2023-12-14 DIAGNOSIS — M43.16 SPONDYLOLISTHESIS OF LUMBAR REGION: ICD-10-CM

## 2023-12-14 DIAGNOSIS — M48.02 SPINAL STENOSIS, CERVICAL REGION: Primary | ICD-10-CM

## 2023-12-14 PROCEDURE — 72148 MRI LUMBAR SPINE W/O DYE: CPT | Mod: TC

## 2023-12-14 PROCEDURE — 72040 X-RAY EXAM NECK SPINE 2-3 VW: CPT | Mod: TC

## 2023-12-14 PROCEDURE — 72120 X-RAY BEND ONLY L-S SPINE: CPT | Mod: TC

## 2023-12-14 PROCEDURE — 72141 MRI NECK SPINE W/O DYE: CPT | Mod: TC

## 2023-12-14 NOTE — TELEPHONE ENCOUNTER
Attempted to contact patient regarding Dr. Joshua's recommendation for the patient to follow up in her clinic in the near future following a CT of the cervical spine.  She is concerned of the stenosis noted in the cervical spine at this time.  No voicemail was available at this time.  I will attempt to contact the patient at a later time.  Cervical CT scan order has been entered.  Thanks

## 2023-12-18 ENCOUNTER — TELEPHONE (OUTPATIENT)
Dept: NEUROSURGERY | Facility: CLINIC | Age: 73
End: 2023-12-18
Payer: MEDICARE

## 2023-12-18 NOTE — TELEPHONE ENCOUNTER
I spoke with the patient to get him scheduled with Dr. Joshua per Erin's recommendations. I got him scheduled with Dr. Joshua for 1/11/24 at 2:00. He requested his CT scan to be performed at [facility]. I got it scheduled for 1/9/24 at 1:00. I did advise him that he is on the wait list and will be receiving a call from me as soon as I do have a sooner opening. He verbalized understanding.

## 2023-12-18 NOTE — TELEPHONE ENCOUNTER
Spoke with patient regarding Dr. Joshua's concern over his cervical canal stenosis.  I am asking Estelita to please contact the patient to schedule consultation with Dr. Joshua sooner rather than later regarding surgical options going forward per Dr. Joshua's request. Thanks

## 2023-12-20 ENCOUNTER — TELEPHONE (OUTPATIENT)
Dept: FAMILY MEDICINE | Facility: CLINIC | Age: 73
End: 2023-12-20
Payer: MEDICARE

## 2023-12-20 NOTE — TELEPHONE ENCOUNTER
Spoke with Sia at [facility]  Confirmed that they can see his imaging   She verified that she does see MRI form 02.2022  Thanks

## 2023-12-20 NOTE — TELEPHONE ENCOUNTER
Please reach out to Dr. Joshua's office with neurosurgery  Patient says that they were not able to view MRI done in 2022 which is in EPIC, please find out if they have access to previous images; if not, please get images sent to Dr. Joshua for comparison, patient has appt in beginning of Jan 2024.  Thanks

## 2023-12-27 ENCOUNTER — HOSPITAL ENCOUNTER (OUTPATIENT)
Dept: RADIOLOGY | Facility: HOSPITAL | Age: 73
Discharge: HOME | End: 2023-12-27
Attending: NURSE PRACTITIONER
Payer: MEDICARE

## 2023-12-27 DIAGNOSIS — M48.02 SPINAL STENOSIS, CERVICAL REGION: ICD-10-CM

## 2023-12-27 PROCEDURE — 72125 CT NECK SPINE W/O DYE: CPT | Mod: TC

## 2023-12-27 NOTE — TELEPHONE ENCOUNTER
I spoke with the patient to get his appointment r/s for a sooner opening. I did offer him to come in at 9:00 on 12/28/23. He was okay with that date and time. I r/s his CT scan at [facility] for 12/27/23 at 2:00. He was compliant w date and time.

## 2023-12-28 ENCOUNTER — OFFICE VISIT (OUTPATIENT)
Dept: NEUROSURGERY | Facility: CLINIC | Age: 73
End: 2023-12-28
Payer: MEDICARE

## 2023-12-28 VITALS
SYSTOLIC BLOOD PRESSURE: 134 MMHG | BODY MASS INDEX: 27.27 KG/M2 | DIASTOLIC BLOOD PRESSURE: 76 MMHG | HEIGHT: 62 IN | WEIGHT: 148.19 LBS | RESPIRATION RATE: 16 BRPM | HEART RATE: 65 BPM

## 2023-12-28 DIAGNOSIS — M43.12 SPONDYLOLISTHESIS OF CERVICAL REGION: ICD-10-CM

## 2023-12-28 DIAGNOSIS — G89.29 CHRONIC BILATERAL LOW BACK PAIN WITHOUT SCIATICA: ICD-10-CM

## 2023-12-28 DIAGNOSIS — M43.16 SPONDYLOLISTHESIS OF LUMBAR REGION: ICD-10-CM

## 2023-12-28 DIAGNOSIS — M48.061 LUMBAR STENOSIS WITHOUT NEUROGENIC CLAUDICATION: ICD-10-CM

## 2023-12-28 DIAGNOSIS — M47.812 CERVICAL SPONDYLOSIS WITHOUT MYELOPATHY: Primary | ICD-10-CM

## 2023-12-28 DIAGNOSIS — M54.50 CHRONIC BILATERAL LOW BACK PAIN WITHOUT SCIATICA: ICD-10-CM

## 2023-12-28 PROCEDURE — 99214 OFFICE O/P EST MOD 30 MIN: CPT | Mod: ,,, | Performed by: NEUROLOGICAL SURGERY

## 2023-12-28 RX ORDER — NAPROXEN SODIUM 220 MG
220 TABLET ORAL 2 TIMES DAILY WITH MEALS
COMMUNITY

## 2023-12-28 RX ORDER — GABAPENTIN 300 MG/1
300 CAPSULE ORAL 3 TIMES DAILY
Qty: 270 CAPSULE | Refills: 1 | Status: SHIPPED | OUTPATIENT
Start: 2023-12-28 | End: 2024-06-25

## 2023-12-28 NOTE — PROGRESS NOTES
[facility]   Neurosurgery      Reji Dozier  MRN: [id number], CSN: [id number]      : 1950   Age: 73 y.o. male  Payor: MEDICARE / Plan: MEDICARE PART A & B / Product Type: Government /       Ref:  No referring provider defined for this encounter.    PCP: Wilton Welch MD    Visit Date: 2023     Patient Active Problem List   Diagnosis    Chronic back pain greater than 3 months duration    Lumbar radiculopathy    Moderate COPD (chronic obstructive pulmonary disease)    HLD (hyperlipidemia)    Chronic midline low back pain without sciatica    Actinic keratosis    Lumbar degenerative disc disease    Spinal stenosis of lumbar region       SUBJECTIVE:      CC:   Chief Complaint   Patient presents with    chronic neck stiffness and chronic low back pain       HPI:   Mr. Dozier is a 73 y.o. male who has a past medical history significant for asthma and COPD.   He presents as a follow up patient in the neurosurgery clinic for chronic neck stiffness and chronic low back pain.     The patient's last date of visit in the neurosurgery clinic was 2023 with MARITZA Khan.  The plan of care was to complete cervical and lumbar spine x-rays, a CT cervical spine without contrast, and MRI studies of his cervical and lumbar spine without gadolinium.  Mr. Dozier completed all of these radiographic studies.    The patient visits the neurosurgery clinic with his wife.  Mr. Dozier reports injuring his neck after hitting a tree while waterskiing during his high school years.  He has soreness in his neck when he turns his head with occasional numbness in his right arm.  The patient does not have any radiating arm pain or weakness.  He has been fully ambulatory with no bowel or bladder incontinence.      Mr. Dozier developed low back pain after a lifting injury in  and started having more consistent pain across his lower back the last 6 years. The patient does not have any radiating leg pain  or focal weakness.  He has intermittent numbness in his right anterior thigh as well as numbness in his right foot with prolonged standing.  There is increased pain in the mornings and with sudden movement.  There was a reduction of pain as he mobilizes throughout the day.  Mr. Dozier tries to stay active by cutting grass and playing basketball on a regular basis.      He has tried taking Tylenol, BC powder, Neurontin, and Zanaflex.  The patient has participated in chiropractic treatments and has used a TENS unit.  Physical therapy exercises have been difficult, as his pain level seems to increase with PT.  Mr. Dozier has completed serial lumbar epidural steroid injections under the care of pain management specialist Dr. Hdz with no significant improvement.      Patient Active Problem List    Diagnosis Date Noted    Lumbar degenerative disc disease 07/27/2023    Spinal stenosis of lumbar region 07/27/2023    Chronic midline low back pain without sciatica 11/28/2022    Actinic keratosis 11/28/2022    Chronic back pain greater than 3 months duration 05/26/2022    Lumbar radiculopathy 05/26/2022    Moderate COPD (chronic obstructive pulmonary disease) 05/26/2022    HLD (hyperlipidemia) 05/26/2022     Past Medical History:   Diagnosis Date    Actinic keratosis     Arthritis     hip    Asthma     Cervicalgia     Chronic back pain     COPD (chronic obstructive pulmonary disease)     Disc displacement, lumbar     Dyslipidemia     Elevated PSA measurement     Hyperlipidemia     Lower extremity weakness     Lumbar radiculitis     Lumbar radiculopathy     Lumbar spinal stenosis     Mixed hyperlipidemia     Muscle spasm of back     Nocturia     Pain in thoracic spine     Radicular low back pain     Segmental and somatic dysfunction of cervical region     Segmental and somatic dysfunction of lumbar region     Segmental and somatic dysfunction of thoracic region     Skin lesion     Solar elastosis     Umbilical hernia      Vertebrogenic low back pain      Past Surgical History:   Procedure Laterality Date    COLONOSCOPY      COLONOSCOPY  05/22/2018    Kenny Baker MD    control bleeding      of gastrointestinal tract    ESOPHAGOGASTRODUODENOSCOPY      HEMORRHOID SURGERY      HERNIA REPAIR      INJECTION OF FACET JOINT Bilateral     SCLEROTHERAPY WITH ULTRASOUND GUIDANCE      TOTAL HIP ARTHROPLASTY Right     TOTAL HIP ARTHROPLASTY Left     TRANSFORAMINAL EPIDURAL INJECTION OF STEROID Bilateral 05/04/2022    Procedure: INJECTION, STEROID, EPIDURAL, TRANSFORAMINAL APPROACH L3;  Surgeon: Bronwyn Hdz MD;  Location: [facility] OR;  Service: Pain Management;  Laterality: Bilateral;    TRANSFORAMINAL EPIDURAL INJECTION OF STEROID Bilateral 09/14/2022    Procedure: Injection,steroid,epidural,transforaminal approach;  Surgeon: Bronwyn Hdz MD;  Location: [facility] OR;  Service: Pain Management;  Laterality: Bilateral;  L3    TRANSFORAMINAL EPIDURAL INJECTION OF STEROID Bilateral 10/26/2022    Procedure: Injection,steroid,epidural,transforaminal approach;  Surgeon: Bronwyn Hdz MD;  Location: [facility] OR;  Service: Pain Management;  Laterality: Bilateral;  L3    TRANSFORAMINAL EPIDURAL INJECTION OF STEROID Bilateral 03/15/2023    Procedure: Injection,steroid,epidural,transforaminal approach;  Surgeon: Bronwyn Hdz MD;  Location: [facility] OR;  Service: Pain Management;  Laterality: Bilateral;  L3       Current Outpatient Medications:     albuterol (PROVENTIL/VENTOLIN HFA) 90 mcg/actuation inhaler, Inhale 2 puffs into the lungs every 6 (six) hours as needed for Wheezing., Disp: 18 g, Rfl: 3    ascorbic acid, vitamin C, 250 mg Chew, Take 1 tablet by mouth once daily., Disp: , Rfl:     aspirin/salicylamide/caffeine (BC HEADACHE POWDER ORAL), Take 1 tablet by mouth as needed., Disp: , Rfl:     atorvastatin (LIPITOR) 10 MG tablet, Take 1 tablet (10 mg total) by mouth every evening., Disp: 90 tablet, Rfl: 3    fluticasone propionate (FLONASE)  50 mcg/actuation nasal spray, USE 2 SPRAYS INTO EACH NOSTRIL TWICE A DAY (Patient taking differently: 1 spray by Each Nostril route Daily. USE 2 SPRAYS INTO EACH NOSTRIL TWICE A DAY), Disp: 96 mL, Rfl: 1    fluticasone-umeclidin-vilanter (TRELEGY ELLIPTA) 200-62.5-25 mcg inhaler, Inhale 1 puff into the lungs once daily., Disp: 60 each, Rfl: 11    ipratropium (ATROVENT) 0.02 % nebulizer solution, Take 2.5 mLs (500 mcg total) by nebulization 3 (three) times daily. (Patient taking differently: Take 500 mcg by nebulization daily as needed.), Disp: 90 mL, Rfl: 3    multivitamin capsule, Take 1 capsule by mouth once daily., Disp: , Rfl:     naproxen sodium (ANAPROX) 220 MG tablet, Take 220 mg by mouth 2 (two) times daily with meals., Disp: , Rfl:     ondansetron (ZOFRAN) 4 MG tablet, Take 4 mg by mouth as needed., Disp: , Rfl:     vitamin D (VITAMIN D3) 1000 units Tab, Take 1,000 Units by mouth once daily., Disp: , Rfl:     gabapentin (NEURONTIN) 300 MG capsule, Take 1 capsule (300 mg total) by mouth 3 (three) times daily., Disp: 270 capsule, Rfl: 1    tiZANidine (ZANAFLEX) 2 MG tablet, TAKE 1 TABLET BY MOUTH EVERY 8 HOURS AS NEEDED FOR MUSCLE SPASM (Patient taking differently: Take 2 mg by mouth Daily.), Disp: 90 tablet, Rfl: 1  No current facility-administered medications for this visit.    Facility-Administered Medications Ordered in Other Visits:     0.9%  NaCl infusion, , Intravenous, Continuous, Deep Pacheco MD    lactated ringers infusion, , Intravenous, Continuous, Deep Pacheco MD    Review of patient's allergies indicates:   Allergen Reactions    Latex, natural rubber        Social History     Tobacco Use    Smoking status: Former     Types: Cigarettes    Smokeless tobacco: Never   Substance Use Topics    Alcohol use: Yes     Alcohol/week: 1.0 standard drink of alcohol     Types: 1 Cans of beer per week     Comment: daily     Occupation: [employer]    Family History   Family history unknown: Yes  "      ROS:  Constitutional:  Negative for chills and fever.   HENT:  Negative for congestion and sore throat.    Eyes:  Negative for blurred vision and double vision.   Respiratory:  Positive for shortness of breath, Negative for cough.  Cardiovascular:  Negative for chest pain and palpitations.   Gastrointestinal:  Negative for constipation, diarrhea, nausea and vomiting.   Musculoskeletal:  Positive for back pain and neck pain.   Neurological:  Positive for sensory change, Negative for focal weakness and headaches.   Endo/Heme/Allergies:  Does not bruise/bleed easily.   Psychiatric/Behavioral:  Negative for depression and anxiety.      OBJECTIVE:     EXAMINATION:  /76   Pulse 65   Resp 16   Ht 5' 2" (1.575 m)   Wt 67.2 kg (148 lb 3.2 oz)   BMI 27.11 kg/m²   Body Habitus: Normal    Physical Exam:  Constitutional: The patient is well-developed and cooperative, sitting comfortably in a chair    Mental Status:   Oriented to person, place, and time  Normal speech    Motor:  Muscle bulk: normal in all extremities  Tone: normal in all extremities    Upper extremities:  Deltoid: right 5/5; left 5/5  Biceps: right 5/5; left 5/5  Triceps: right 5/5; left 5/5  Wrist extensors: right 5/5; left 5/5  Wrist flexors: right 5/5; left 5/5  : right 5/5; left 5/5  Interosseous muscles: right 5/5; left 5/5    Lower extremities:  Hip flexors: right 5/5; left 5/5  Knee extensors: right 5/5; left 5/5  Knee flexors: right 5/5; left 5/5  Foot dorsiflexors: right 5/5; left 5/5  Foot plantar flexors: right 5/5; left 5/5  Extensor hallucis longus: right 5/5; left 5/5    Sensation:  Normal to light touch x 4 extremities    Reflexes:  Biceps: right 1+/4; left 1+/4  Brachioradialis: right 1+/4; left 1+/4  Triceps: right 1+/4; left 1+/4  Knee: right 2+/4; left 3+/4  Ankle: right 0/4; left 0/4    Segals sign: right negative; left negative  Babinski: right downgoing; left downgoing  Clonus: right negative; left " negative    Musculoskeletal:    Gait: stiff    Straight leg test: right negative; left negative    Cervical: No pain with palpation  Upper back: No pain with palpation  Lower back: No pain with palpation      DIAGNOSTICS REVIEW OF IMAGING, LAB & OTHER STUDIES:  I have personally reviewed and evaluated the following reports as well as radiographic studies:    Cervical spine x-rays, 12/14/2023- there is straightening of the cervical spine with mild kyphosis at C3-4 and multilevel degenerative disc disease. The C4-5 vertebral bodies are fused with Grade I retrolisthesis.   There is no motion on flexion-extension views.      Lumbar spine x-rays, 12/14/2023-  there is Grade I retrolisthesis of L2 on L3 and Grade I anterior spondylolisthesis of L3 on L4.   There is no motion on flexion-extension views.      CT cervical spine without contrast, 12/14/2023- there is straightening of the cervical spine with mild kyphosis at C3-4  and multilevel degenerative disc disease.  There is Grade I anterolisthesis of C3 on C4 and Grade I retrolisthesis of C4 on C5 with the C4-5 vertebral bodies fused.      MRI cervical spine without gadolinium, 12/14/2023- there is straightening of the cervical spine with mild kyphosis at C3-4.  There is Grade I anterior spondylolisthesis of C3 on C4 and Grade I retrolisthesis of C4 on C5 with the C4-5 vertebral bodies fused.  At C3-4, there is left neuroforaminal narrowing.  At C4-5 and C5-6, there is moderate stenosis with bilateral neuroforaminal narrowing.  At C6-7, there is severe stenosis with no visualization of CSF circumferentially around the spinal cord along with severe bilateral neuroforaminal narrowing.    MRI lumbar spine without gadolinium, 12/14/2023- there is Grade I anterior spondylolisthesis of L3 on L4 and L4 on L5.  L2-3 has degenerative disc disease with Modic changes.  At L2-3 and L4-5, there is severe stenosis.  At L3-4, there is a disc protrusion that has migrated behind the L3  vertebral body associated with severe stenosis and bilateral neuroforaminal narrowing.        ASSESSMENT:  Mr. Dozier is a 73 y.o. male who has a past medical history significant for asthma and COPD.   He presents as a follow up patient in the neurosurgery clinic for chronic neck stiffness and chronic low back pain.  The patient has a normal motor and sensory neurological exam with no signs of pathological myelopathy.    I reviewed pertinent imaging studies with the patient and his wife.  A MRI cervical spine without gadolinium demonstrates straightening of the cervical spine with mild kyphosis at C3-4.  There is Grade I anterior spondylolisthesis of C3 on C4 and Grade I retrolisthesis of C4 on C5 with the C4-5 vertebral bodies fused.  At C3-4, there is left neuroforaminal narrowing.  At C4-5 and C5-6, there is moderate stenosis with bilateral neuroforaminal narrowing.  At C6-7, there is severe stenosis with no visualization of CSF circumferentially around the spinal cord along with severe bilateral neuroforaminal narrowing.  A MRI lumbar spine without gadolinium demonstrates Grade I anterior spondylolisthesis of L3 on L4 and L4 on L5.  L2-3 has degenerative disc disease with Modic changes.  At L2-3 and L4-5, there is severe stenosis.  At L3-4, there is a disc protrusion that has migrated behind the L3 vertebral body associated with severe stenosis and bilateral neuroforaminal narrowing.          PLAN:    Encounter Diagnoses   Name Primary?    Cervical spondylosis without myelopathy Yes    Spondylolisthesis of cervical region     Spondylolisthesis of lumbar region     Lumbar stenosis without neurogenic claudication     Chronic bilateral low back pain without sciatica      Orders Placed This Encounter   Procedures    Ambulatory referral/consult to Physical/Occupational Therapy        1.  I discussed with Mr. Dozier that he is relatively asymptomatic from the degenerative findings in both his neck and lower back.   His level of activity is much higher than what would be anticipated with the degenerative changes along his spinal canal.  Continued optimization of medical management is recommended.  Mr. Dozier is very agreeable to this nonsurgical plan care.    2.  I am increasing his Neurontin dose to 300 mg 3 times a day from 100 mg 3 times a day.  Additional prescriptions are deferred to his primary care physician as a maintenance medication.    3.  Mr. Dozier is being referred to physical therapy at [facility] in [place], [place].     4.  He is advised to discontinue chiropractic treatments due to his degenerative spinal anatomy.    5.  Arrangements are being made for the patient to follow up in the neurosurgery clinic with MARITZA Khan in 6 months for clinical surveillance.       This note will be sent to the patient's referring provider No ref. provider found and primary care provider Wilton Welch MD.           Rowan Joshua MD  Neurosurgeon

## 2023-12-28 NOTE — PATIENT INSTRUCTIONS
Mr. Dozier is a 73 y.o. male who has a past medical history significant for asthma and COPD.   He presents as a follow up patient in the neurosurgery clinic for chronic neck stiffness and chronic low back pain.  The patient has a normal motor and sensory neurological exam with no signs of pathological myelopathy.    I reviewed pertinent imaging studies with the patient and his wife.  A MRI cervical spine without gadolinium demonstrates straightening of the cervical spine with mild kyphosis at C3-4.  There is Grade I anterior spondylolisthesis of C3 on C4 and Grade I retrolisthesis of C4 on C5 with the C4-5 vertebral bodies fused.  At C3-4, there is left neuroforaminal narrowing.  At C4-5 and C5-6, there is moderate stenosis with bilateral neuroforaminal narrowing.  At C6-7, there is severe stenosis with no visualization of CSF circumferentially around the spinal cord along with severe bilateral neuroforaminal narrowing.  A MRI lumbar spine without gadolinium demonstrates Grade I anterior spondylolisthesis of L3 on L4 and L4 on L5.  L2-3 has degenerative disc disease with Modic changes.  At L2-3 and L4-5, there is severe stenosis.  At L3-4, there is a disc protrusion that has migrated behind the L3 vertebral body associated with severe stenosis and bilateral neuroforaminal narrowing.          PLAN:    Encounter Diagnoses   Name Primary?    Cervical spondylosis without myelopathy Yes    Spondylolisthesis of cervical region     Spondylolisthesis of lumbar region     Lumbar stenosis without neurogenic claudication     Chronic bilateral low back pain without sciatica      Orders Placed This Encounter   Procedures    Ambulatory referral/consult to Physical/Occupational Therapy        1.  I discussed with Mr. Dozier that he is relatively asymptomatic from the degenerative findings in both his neck and lower back.  His level of activity is much higher than what would be anticipated with the degenerative changes along his  spinal canal.  Continued optimization of medical management is recommended.  Mr. Dozier is very agreeable to this nonsurgical plan care.    2.  I am increasing his Neurontin dose to 300 mg 3 times a day from 100 mg 3 times a day.  Additional prescriptions are deferred to his primary care physician as a maintenance medication.    3.  Mr. Dozier is being referred to physical therapy at [facility] in [place], [place].     4.  He is advised to discontinue chiropractic treatments due to his degenerative spinal anatomy.    5.  Arrangements are being made for the patient to follow up in the neurosurgery clinic with MARITZA Khan in 6 months for clinical surveillance.       This note will be sent to the patient's referring provider No ref. provider found and primary care provider Wilton Welch MD.

## 2024-01-04 ENCOUNTER — TELEPHONE (OUTPATIENT)
Dept: NEUROSURGERY | Facility: CLINIC | Age: 74
End: 2024-01-04
Payer: MEDICARE

## 2024-01-04 NOTE — TELEPHONE ENCOUNTER
I spoke with the patients wife to see if they have heard from Physical Therapy [facility] regarding an appointment. She stated he is actually there now, his appointment was at 2:30.

## 2024-02-02 ENCOUNTER — TELEPHONE (OUTPATIENT)
Dept: FAMILY MEDICINE | Facility: CLINIC | Age: 74
End: 2024-02-02
Payer: MEDICARE

## 2024-02-02 DIAGNOSIS — J44.1 COPD WITH ACUTE EXACERBATION: ICD-10-CM

## 2024-02-02 DIAGNOSIS — J45.909 ASTHMA DUE TO ENVIRONMENTAL ALLERGIES: ICD-10-CM

## 2024-02-02 RX ORDER — FLUTICASONE FUROATE, UMECLIDINIUM BROMIDE AND VILANTEROL TRIFENATATE 200; 62.5; 25 UG/1; UG/1; UG/1
1 POWDER RESPIRATORY (INHALATION) DAILY
Qty: 60 EACH | Refills: 11 | Status: SHIPPED | OUTPATIENT
Start: 2024-02-02

## 2024-02-02 RX ORDER — FLUTICASONE PROPIONATE 50 MCG
SPRAY, SUSPENSION (ML) NASAL
Qty: 96 ML | Refills: 1 | Status: SHIPPED | OUTPATIENT
Start: 2024-02-02

## 2024-02-02 NOTE — TELEPHONE ENCOUNTER
----- Message from Imelda Guy sent at 2/1/2024  4:21 PM CST -----  .Type:  RX Refill Request    Who Called: pt  Refill or New Rx:refill  RX Name and Strength:fluticasone propionate (FLONASE) 50  How is the patient currently taking it? (ex. 1XDay):  Is this a 30 day or 90 day RX:  Preferred Pharmacy with phone number:[facility]  Local or Mail Order:local  Ordering Provider:Rebekah  Would the patient rather a call back or a response via [facility]?   Best Call Back Number:[phone]  Additional Information: need refill

## 2024-02-02 NOTE — TELEPHONE ENCOUNTER
----- Message from Sushma Cheung sent at 2/1/2024  4:38 PM CST -----  Regarding: refill  Type:  RX Refill Request    Who Called: pt    RX Name and Strength:fluticasone-umeclidin-vilanter (TRELEGY ELLIPTA) 200-62.5-25 mcg inhaler    Preferred Pharm[facility]    Best Call Back Number:[phone]

## 2024-02-27 ENCOUNTER — TELEPHONE (OUTPATIENT)
Dept: FAMILY MEDICINE | Facility: CLINIC | Age: 74
End: 2024-02-27
Payer: MEDICARE

## 2024-02-27 DIAGNOSIS — M54.16 LUMBAR RADICULOPATHY: Primary | ICD-10-CM

## 2024-02-27 DIAGNOSIS — M51.36 LUMBAR DEGENERATIVE DISC DISEASE: ICD-10-CM

## 2024-02-27 NOTE — TELEPHONE ENCOUNTER
Spoke with pt  Advised of recommendations of both referrals   Pt in agreeance with both referrals  Advised to give them about 14 days to reach out to get scheduled  Pt expressed understanding

## 2024-02-27 NOTE — TELEPHONE ENCOUNTER
Patient did drop off a letter regarding his back pain issues. I did attempt to call no answer.    Please call patient and let him know my thoughts.  I have read the results of the MRI of both his lumbar and cervical spine. Dr. Joshua's OV notes describe that patient is mostly asymptomatic from these issues but Mr. Valentino our patient is reporting a lot of back pain. She did increase his frequency of gabapentin and advised him to see a physical therapist as well.    I recommend that we do work on treating his pain.   I would like patient to work on two referrals:  Get a 2nd opinion about his back problem from Dr. Stu Murray- neurosurgeon who does a lot of micro surgery. Let him review the MRI and discuss treatment options with the patient. Let him see if we have really done the best non surgical options and if he really does need surgery as the only option to treat his pain.   See a different pain management physician Dr. Ralph George at [facility]- let him take a look and see if there are any other nonsurgical options remaining.     So I want him to see both physicians to ensure he can make the best step moving forward. I did refer him to Dr. George some time ago but it says patient refused the referral??? Please set up a phone call at 8 am M-Th visit if patient has add'l questions.     Thanks

## 2024-02-29 ENCOUNTER — TELEPHONE (OUTPATIENT)
Dept: FAMILY MEDICINE | Facility: CLINIC | Age: 74
End: 2024-02-29
Payer: MEDICARE

## 2024-02-29 DIAGNOSIS — F17.210 NICOTINE DEPENDENCE, CIGARETTES, UNCOMPLICATED: Primary | ICD-10-CM

## 2024-02-29 NOTE — TELEPHONE ENCOUNTER
----- Message from Artemio Diaz sent at 2/28/2024  4:42 PM CST -----  .Type:  Patient Returning Call    Who Called:pt   Who Left Message for Patient:  Does the patient know what this is regarding?:pt wants to know if the request for the lung screening sent over to the dept. ? Pt states he has an appt and can do the screening on tomorrow at 10am   Would the patient rather a call back or a response via [facility]? Call back   Best Call Back Number:[phone]  Additional Information:

## 2024-03-01 NOTE — TELEPHONE ENCOUNTER
I have signed for the following orders AND/OR meds. Please notify the patient and ask the patient to schedule the testing and/or information about any medications that were sent.         Orders Placed This Encounter   Procedures    CT Chest Lung Screening Low Dose     Standing Status:   Future     Standing Expiration Date:   2/28/2025     Order Specific Question:   Is there documentation of shared decision making for this lung screening exam?     Answer:   Yes     Order Specific Question:   Is the patient a current smoker?     Answer:   No     Order Specific Question:   How many years has the patient quit smoking?     Answer:   10     Order Specific Question:   Does the patient have a 20-pack/year or greater smoke history?     Answer:   No     Order Specific Question:   This procedure is NOT covered by insurance     Answer:   Acknowledged     Order Specific Question:   Is the patient between the ages 50-80 years old?     Answer:   Yes     Order Specific Question:   Does the patient show any signs or symptoms of lung cancer?     Answer:   No     Order Specific Question:   Is this the first (baseline) CT or an annual exam?     Answer:   Annual [2]     Order Specific Question:   May the Radiologist modify the order per protocol to meet the clinical needs of the patient?     Answer:   Yes     Order Specific Question:   Is this a low dose screening chest CT?     Answer:   Yes

## 2024-03-04 ENCOUNTER — HOSPITAL ENCOUNTER (OUTPATIENT)
Dept: RADIOLOGY | Facility: HOSPITAL | Age: 74
Discharge: HOME | End: 2024-03-04
Attending: INTERNAL MEDICINE
Payer: MEDICARE

## 2024-03-04 DIAGNOSIS — F17.210 NICOTINE DEPENDENCE, CIGARETTES, UNCOMPLICATED: ICD-10-CM

## 2024-03-04 PROCEDURE — 71271 CT THORAX LUNG CANCER SCR C-: CPT | Mod: TC

## 2024-03-06 ENCOUNTER — TELEPHONE (OUTPATIENT)
Dept: FAMILY MEDICINE | Facility: CLINIC | Age: 74
End: 2024-03-06
Payer: MEDICARE

## 2024-03-06 DIAGNOSIS — R91.1 SOLITARY PULMONARY NODULE: Primary | ICD-10-CM

## 2024-03-06 NOTE — TELEPHONE ENCOUNTER
----- Message from Wilton Welch MD sent at 3/4/2024  8:58 PM CST -----  Please call patient  Let him know that his CT chest shows a new nodules that are abnormal in appearance,the radiologist recommends to repeat the testing in 6 months; if agreeable, I will order

## 2024-03-08 NOTE — TELEPHONE ENCOUNTER
I have signed for the following orders AND/OR meds. Please notify the patient and ask the patient to schedule the testing and/or information about any medications that were sent.         Orders Placed This Encounter   Procedures    CT Chest Low Dose Diagnostic     Standing Status:   Future     Standing Expiration Date:   3/7/2025     Order Specific Question:   May the Radiologist modify the order per protocol to meet the clinical needs of the patient?     Answer:   Yes

## 2024-05-13 ENCOUNTER — TELEPHONE (OUTPATIENT)
Dept: FAMILY MEDICINE | Facility: CLINIC | Age: 74
End: 2024-05-13
Payer: MEDICARE

## 2024-05-13 DIAGNOSIS — J44.9 MODERATE COPD (CHRONIC OBSTRUCTIVE PULMONARY DISEASE): ICD-10-CM

## 2024-05-13 RX ORDER — ALBUTEROL SULFATE 90 UG/1
2 AEROSOL, METERED RESPIRATORY (INHALATION) EVERY 6 HOURS PRN
Qty: 18 G | Refills: 3 | Status: SHIPPED | OUTPATIENT
Start: 2024-05-13

## 2024-05-13 NOTE — TELEPHONE ENCOUNTER
----- Message from Aaron Clay sent at 5/10/2024  3:17 PM CDT -----  Type:  RX Refill Request    Who Called: pt  Refill or New Rx:refill  RX Name and Strength:albuterol (PROVENTIL/VENTOLIN HFA) 90 mcg/actuation inhaler    [facility] Pharm[facility] with phone number[id number] [address] AT [facility] AT [address]    Best Call Back Number: [phone]

## 2024-05-22 RX ORDER — BENZONATATE 200 MG/1
200 CAPSULE ORAL 3 TIMES DAILY PRN
Qty: 30 CAPSULE | Refills: 3 | Status: SHIPPED | OUTPATIENT
Start: 2024-05-22 | End: 2024-06-06

## 2024-05-22 NOTE — PROGRESS NOTES
Please call patient  Notify him that I have sent him refill for tessalon perles to his pharmacy on file  thanks

## 2024-05-28 DIAGNOSIS — J44.9 MODERATE COPD (CHRONIC OBSTRUCTIVE PULMONARY DISEASE): ICD-10-CM

## 2024-05-28 DIAGNOSIS — J44.1 COPD WITH ACUTE EXACERBATION: ICD-10-CM

## 2024-05-28 DIAGNOSIS — E78.2 MIXED HYPERLIPIDEMIA: Primary | ICD-10-CM

## 2024-05-29 DIAGNOSIS — Z12.5 SCREENING FOR MALIGNANT NEOPLASM OF PROSTATE: Primary | ICD-10-CM

## 2024-05-30 ENCOUNTER — LAB VISIT (OUTPATIENT)
Dept: LAB | Facility: HOSPITAL | Age: 74
End: 2024-05-30
Attending: INTERNAL MEDICINE
Payer: MEDICARE

## 2024-05-30 DIAGNOSIS — E78.2 MIXED HYPERLIPIDEMIA: ICD-10-CM

## 2024-05-30 DIAGNOSIS — J44.1 COPD WITH ACUTE EXACERBATION: ICD-10-CM

## 2024-05-30 DIAGNOSIS — J44.9 MODERATE COPD (CHRONIC OBSTRUCTIVE PULMONARY DISEASE): ICD-10-CM

## 2024-05-30 LAB
ALBUMIN SERPL-MCNC: 3.7 G/DL (ref 3.4–4.8)
ALBUMIN/GLOB SERPL: 1.1 RATIO (ref 1.1–2)
ALP SERPL-CCNC: 82 UNIT/L (ref 40–150)
ALT SERPL-CCNC: 15 UNIT/L (ref 0–55)
ANION GAP SERPL CALC-SCNC: 7 MEQ/L
AST SERPL-CCNC: 21 UNIT/L (ref 5–34)
BASOPHILS # BLD AUTO: 0.03 X10(3)/MCL
BASOPHILS NFR BLD AUTO: 0.4 %
BILIRUB SERPL-MCNC: 0.6 MG/DL
BUN SERPL-MCNC: 14.4 MG/DL (ref 8.4–25.7)
CALCIUM SERPL-MCNC: 9.1 MG/DL (ref 8.8–10)
CHLORIDE SERPL-SCNC: 109 MMOL/L (ref 98–107)
CHOLEST SERPL-MCNC: 187 MG/DL
CHOLEST/HDLC SERPL: 4 {RATIO} (ref 0–5)
CO2 SERPL-SCNC: 23 MMOL/L (ref 23–31)
CREAT SERPL-MCNC: 0.96 MG/DL (ref 0.73–1.18)
CREAT/UREA NIT SERPL: 15
EOSINOPHIL # BLD AUTO: 0.23 X10(3)/MCL (ref 0–0.9)
EOSINOPHIL NFR BLD AUTO: 3.1 %
ERYTHROCYTE [DISTWIDTH] IN BLOOD BY AUTOMATED COUNT: 14.7 % (ref 11.5–17)
GFR SERPLBLD CREATININE-BSD FMLA CKD-EPI: >60 ML/MIN/1.73/M2
GLOBULIN SER-MCNC: 3.3 GM/DL (ref 2.4–3.5)
GLUCOSE SERPL-MCNC: 109 MG/DL (ref 82–115)
HCT VFR BLD AUTO: 45.1 % (ref 42–52)
HDLC SERPL-MCNC: 53 MG/DL (ref 35–60)
HGB BLD-MCNC: 15.5 G/DL (ref 14–18)
IMM GRANULOCYTES # BLD AUTO: 0.01 X10(3)/MCL (ref 0–0.04)
IMM GRANULOCYTES NFR BLD AUTO: 0.1 %
LDLC SERPL CALC-MCNC: 110 MG/DL (ref 50–140)
LYMPHOCYTES # BLD AUTO: 2.25 X10(3)/MCL (ref 0.6–4.6)
LYMPHOCYTES NFR BLD AUTO: 30.7 %
MCH RBC QN AUTO: 30.8 PG (ref 27–31)
MCHC RBC AUTO-ENTMCNC: 34.4 G/DL (ref 33–36)
MCV RBC AUTO: 89.7 FL (ref 80–94)
MONOCYTES # BLD AUTO: 0.64 X10(3)/MCL (ref 0.1–1.3)
MONOCYTES NFR BLD AUTO: 8.7 %
NEUTROPHILS # BLD AUTO: 4.17 X10(3)/MCL (ref 2.1–9.2)
NEUTROPHILS NFR BLD AUTO: 57 %
NRBC BLD AUTO-RTO: 0 %
PLATELET # BLD AUTO: 189 X10(3)/MCL (ref 130–400)
PMV BLD AUTO: 10.5 FL (ref 7.4–10.4)
POTASSIUM SERPL-SCNC: 4.1 MMOL/L (ref 3.5–5.1)
PROT SERPL-MCNC: 7 GM/DL (ref 5.8–7.6)
RBC # BLD AUTO: 5.03 X10(6)/MCL (ref 4.7–6.1)
SODIUM SERPL-SCNC: 139 MMOL/L (ref 136–145)
TRIGL SERPL-MCNC: 122 MG/DL (ref 34–140)
VLDLC SERPL CALC-MCNC: 24 MG/DL
WBC # SPEC AUTO: 7.33 X10(3)/MCL (ref 4.5–11.5)

## 2024-05-30 PROCEDURE — 36415 COLL VENOUS BLD VENIPUNCTURE: CPT

## 2024-05-30 PROCEDURE — 80053 COMPREHEN METABOLIC PANEL: CPT

## 2024-05-30 PROCEDURE — 80061 LIPID PANEL: CPT

## 2024-05-30 PROCEDURE — 85025 COMPLETE CBC W/AUTO DIFF WBC: CPT

## 2024-06-06 ENCOUNTER — OFFICE VISIT (OUTPATIENT)
Dept: FAMILY MEDICINE | Facility: CLINIC | Age: 74
End: 2024-06-06
Payer: MEDICARE

## 2024-06-06 VITALS
TEMPERATURE: 99 F | SYSTOLIC BLOOD PRESSURE: 120 MMHG | DIASTOLIC BLOOD PRESSURE: 76 MMHG | BODY MASS INDEX: 26.68 KG/M2 | HEART RATE: 66 BPM | HEIGHT: 62 IN | WEIGHT: 145 LBS | RESPIRATION RATE: 18 BRPM | OXYGEN SATURATION: 96 %

## 2024-06-06 DIAGNOSIS — E78.2 MIXED HYPERLIPIDEMIA: ICD-10-CM

## 2024-06-06 DIAGNOSIS — J44.9 MODERATE COPD (CHRONIC OBSTRUCTIVE PULMONARY DISEASE): ICD-10-CM

## 2024-06-06 DIAGNOSIS — G89.29 CHRONIC MIDLINE LOW BACK PAIN WITHOUT SCIATICA: Primary | ICD-10-CM

## 2024-06-06 DIAGNOSIS — M54.50 CHRONIC MIDLINE LOW BACK PAIN WITHOUT SCIATICA: Primary | ICD-10-CM

## 2024-06-06 PROBLEM — M54.9 CHRONIC BACK PAIN GREATER THAN 3 MONTHS DURATION: Status: RESOLVED | Noted: 2022-05-26 | Resolved: 2024-06-06

## 2024-06-06 PROCEDURE — 99214 OFFICE O/P EST MOD 30 MIN: CPT | Mod: ,,, | Performed by: INTERNAL MEDICINE

## 2024-06-06 RX ORDER — BENZONATATE 200 MG/1
200 CAPSULE ORAL 3 TIMES DAILY PRN
Qty: 30 CAPSULE | Refills: 5 | Status: SHIPPED | OUTPATIENT
Start: 2024-06-06

## 2024-06-06 RX ORDER — PANTOPRAZOLE SODIUM 20 MG/1
20 TABLET, DELAYED RELEASE ORAL DAILY
Qty: 90 TABLET | Refills: 3 | Status: SHIPPED | OUTPATIENT
Start: 2024-06-06 | End: 2025-06-06

## 2024-06-06 RX ORDER — ATORVASTATIN CALCIUM 10 MG/1
10 TABLET, FILM COATED ORAL NIGHTLY
Qty: 90 TABLET | Refills: 3 | Status: SHIPPED | OUTPATIENT
Start: 2024-06-06

## 2024-06-06 NOTE — PROGRESS NOTES
Subjective:      Patient ID: Reji Dozier is a 73 y.o. male.    Chief Complaint: Hyperlipidemia    HPI  6 month f/u visit  Doing well overall      COPD: compliant with Trelegy, sx controlled    HLD: compliant with statin, no myalgias    Lower back pain and RLE radiculopathy:  S/p FABY with Dr. Hdz and goes to chiropracter PRN  Patient says he cancelled last injection since it didn't work  I referred patient to see Dr. George for second opinion on conservative and to Dr. Joshua with NS management of pain, he recommend NS evaluation, referred him to Dr. Leo who has recommended him surgery for him  He is planning surgery for Nov 2024 for his back  He was told not much to do for his neck     Elevated PSA: last OV note reviewed Dr. [facility] urology, patient says at last OV was told his prostate biospy samples was normal and some confusion about 6 month or 1 year follow up   Repeat PSA now <1    flu shot: due at local pharmacy 2023  COVID 19 vaccine: x 3 doses  Pneumovax and Prevnar completed  RSV education provided   Ct lung cancer screening: ordered for 9/2024     Health Maintenance Due   Topic Date Due    Shingles Vaccine (1 of 2) Never done    TETANUS VACCINE  11/01/2021    COVID-19 Vaccine (4 - 2023-24 season) 09/01/2023     Review of Systems   Constitutional:  Negative for chills, fatigue and fever.   HENT:  Negative for congestion, ear pain, postnasal drip, rhinorrhea, sinus pressure and sore throat.    Eyes:  Negative for itching and visual disturbance.   Respiratory:  Negative for cough, shortness of breath and wheezing.    Cardiovascular:  Negative for chest pain, palpitations and leg swelling.   Gastrointestinal:  Negative for abdominal pain and nausea.   Genitourinary:  Negative for dysuria.   Musculoskeletal:  Positive for back pain and neck pain. Negative for arthralgias and myalgias.   Skin:  Negative for rash.   Neurological:  Negative for weakness, light-headedness and headaches.  "      Objective:     Vitals:    06/06/24 1024   BP: 120/76   BP Location: Left arm   Patient Position: Sitting   BP Method: Large (Automatic)   Pulse: 66   Resp: 18   Temp: 98.7 °F (37.1 °C)   TempSrc: Temporal   SpO2: 96%   Weight: 65.8 kg (145 lb)   Height: 5' 2" (1.575 m)     Physical Exam  Vitals and nursing note reviewed.   Constitutional:       General: He is not in acute distress.     Appearance: Normal appearance. He is well-developed. He is not diaphoretic.   HENT:      Head: Normocephalic and atraumatic.      Mouth/Throat:      Pharynx: No oropharyngeal exudate.   Eyes:      General:         Right eye: No discharge.         Left eye: No discharge.      Conjunctiva/sclera: Conjunctivae normal.      Pupils: Pupils are equal, round, and reactive to light.   Neck:      Thyroid: No thyromegaly.   Cardiovascular:      Rate and Rhythm: Normal rate and regular rhythm.      Heart sounds: Normal heart sounds. No murmur heard.  Pulmonary:      Effort: Pulmonary effort is normal. No respiratory distress.      Breath sounds: Normal breath sounds. No wheezing or rales.   Abdominal:      General: There is no distension.      Palpations: Abdomen is soft.      Tenderness: There is no abdominal tenderness.   Musculoskeletal:      Cervical back: Normal range of motion and neck supple.      Right lower leg: No edema.      Left lower leg: No edema.   Lymphadenopathy:      Cervical: No cervical adenopathy.   Skin:     General: Skin is warm and dry.   Neurological:      Mental Status: He is alert and oriented to person, place, and time.   Psychiatric:         Mood and Affect: Mood normal.         Behavior: Behavior normal.       Assessment/Plan:     1. Chronic midline low back pain without sciatica  Unchanged  Planning surgery soon  2. Mixed hyperlipidemia  -     atorvastatin (LIPITOR) 10 MG tablet; Take 1 tablet (10 mg total) by mouth every evening.  Dispense: 90 tablet; Refill: 3  Stable at goal   Continue current Rx  3. " Moderate COPD (chronic obstructive pulmonary disease)  Stable sx controlled  Continue trelegy  Other orders  -     benzonatate (TESSALON) 200 MG capsule; Take 1 capsule (200 mg total) by mouth 3 (three) times daily as needed for Cough.  Dispense: 30 capsule; Refill: 5  -     pantoprazole (PROTONIX) 20 MG tablet; Take 1 tablet (20 mg total) by mouth once daily.  Dispense: 90 tablet; Refill: 3       Follow up in about 6 months (around 12/6/2024) for Medicare Wellness.    I spent a total of 30 minutes on the day of the visit.This includes face to face time and non-face to face time preparing to see the patient (eg, review of tests), obtaining and/or reviewing separately obtained history, documenting clinical information in the electronic or other health record, independently interpreting results and communicating results to the patient/family/caregiver, or care coordinator.

## 2024-08-01 ENCOUNTER — TELEPHONE (OUTPATIENT)
Dept: FAMILY MEDICINE | Facility: CLINIC | Age: 74
End: 2024-08-01
Payer: MEDICARE

## 2024-08-01 DIAGNOSIS — F17.210 NICOTINE DEPENDENCE, CIGARETTES, UNCOMPLICATED: Primary | ICD-10-CM

## 2024-08-01 NOTE — TELEPHONE ENCOUNTER
"----- Message from Cora Vasquez sent at 8/1/2024 11:47 AM CDT -----  Regarding: ldct  We are trying to schedule this patient for the LDCT scan. However the order was put in as a diagnostic. Can you please reorder this scan "CT chest lung screening low dose". Dx code z87.891      Let me know if you have any questions. Thank you.     Cora  Access Navigator  "

## 2024-08-01 NOTE — TELEPHONE ENCOUNTER
I have signed for the following orders AND/OR meds. Please notify the patient and ask the patient to schedule the testing and/or information about any medications that were sent.         Orders Placed This Encounter   Procedures    CT Chest Lung Screening Low Dose     Standing Status:   Future     Standing Expiration Date:   8/1/2025     Order Specific Question:   Is there documentation of shared decision making for this lung screening exam?     Answer:   Yes     Order Specific Question:   Is the patient a current smoker?     Answer:   No     Order Specific Question:   How many years has the patient quit smoking?     Answer:   10     Order Specific Question:   Does the patient have a 20-pack/year or greater smoke history?     Answer:   No     Order Specific Question:   This procedure is NOT covered by insurance     Answer:   Acknowledged     Order Specific Question:   Is the patient between the ages 50-80 years old?     Answer:   Yes     Order Specific Question:   Does the patient show any signs or symptoms of lung cancer?     Answer:   No     Order Specific Question:   Is this the first (baseline) CT or an annual exam?     Answer:   Annual [2]     Order Specific Question:   May the Radiologist modify the order per protocol to meet the clinical needs of the patient?     Answer:   Yes     Order Specific Question:   Is this a low dose screening chest CT?     Answer:   Yes

## 2024-08-20 DIAGNOSIS — R91.1 LUNG NODULE: Primary | ICD-10-CM

## 2024-09-08 DIAGNOSIS — J44.1 COPD WITH ACUTE EXACERBATION: ICD-10-CM

## 2024-09-09 RX ORDER — FLUTICASONE FUROATE, UMECLIDINIUM BROMIDE AND VILANTEROL TRIFENATATE 200; 62.5; 25 UG/1; UG/1; UG/1
1 POWDER RESPIRATORY (INHALATION) DAILY
Qty: 180 EACH | Refills: 1 | Status: SHIPPED | OUTPATIENT
Start: 2024-09-09

## 2024-09-13 ENCOUNTER — HOSPITAL ENCOUNTER (OUTPATIENT)
Dept: RADIOLOGY | Facility: HOSPITAL | Age: 74
Discharge: HOME | End: 2024-09-13
Attending: INTERNAL MEDICINE
Payer: MEDICARE

## 2024-09-13 DIAGNOSIS — R91.1 LUNG NODULE: ICD-10-CM

## 2024-09-13 PROCEDURE — 71250 CT THORAX DX C-: CPT | Mod: TC

## 2024-10-04 ENCOUNTER — OFFICE VISIT (OUTPATIENT)
Dept: FAMILY MEDICINE | Facility: CLINIC | Age: 74
End: 2024-10-04
Payer: MEDICARE

## 2024-10-04 ENCOUNTER — TELEPHONE (OUTPATIENT)
Dept: FAMILY MEDICINE | Facility: CLINIC | Age: 74
End: 2024-10-04

## 2024-10-04 VITALS
RESPIRATION RATE: 20 BRPM | SYSTOLIC BLOOD PRESSURE: 112 MMHG | DIASTOLIC BLOOD PRESSURE: 70 MMHG | TEMPERATURE: 99 F | BODY MASS INDEX: 27.11 KG/M2 | HEART RATE: 70 BPM | WEIGHT: 147.31 LBS | OXYGEN SATURATION: 94 % | HEIGHT: 62 IN

## 2024-10-04 DIAGNOSIS — J44.1 COPD EXACERBATION: Primary | ICD-10-CM

## 2024-10-04 DIAGNOSIS — H65.91 RIGHT OTITIS MEDIA WITH EFFUSION: ICD-10-CM

## 2024-10-04 DIAGNOSIS — I70.0 AORTIC ATHEROSCLEROSIS: ICD-10-CM

## 2024-10-04 RX ORDER — IPRATROPIUM BROMIDE AND ALBUTEROL SULFATE 2.5; .5 MG/3ML; MG/3ML
3 SOLUTION RESPIRATORY (INHALATION) EVERY 6 HOURS PRN
Qty: 75 ML | Refills: 3 | Status: SHIPPED | OUTPATIENT
Start: 2024-10-04 | End: 2025-10-04

## 2024-10-04 RX ORDER — LEVOFLOXACIN 750 MG/1
750 TABLET ORAL DAILY
Qty: 10 TABLET | Refills: 0 | Status: SHIPPED | OUTPATIENT
Start: 2024-10-04 | End: 2024-10-14

## 2024-10-04 RX ORDER — HYDROCODONE BITARTRATE AND HOMATROPINE METHYLBROMIDE ORAL SOLUTION 5; 1.5 MG/5ML; MG/5ML
5 LIQUID ORAL EVERY 6 HOURS PRN
Qty: 473 ML | Refills: 0 | Status: SHIPPED | OUTPATIENT
Start: 2024-10-04

## 2024-10-04 RX ORDER — PREDNISONE 20 MG/1
TABLET ORAL
Qty: 10 TABLET | Refills: 0 | Status: SHIPPED | OUTPATIENT
Start: 2024-10-04

## 2024-10-04 NOTE — TELEPHONE ENCOUNTER
Spoke with pts wife  Advised for pt to come in at 0945  Pts wife expressed understanding stated that he will be here  Thanks

## 2024-10-04 NOTE — PROGRESS NOTES
"Subjective:      Patient ID: Reji Dozier is a 74 y.o. male.    Chief Complaint: Cough    HPI  Patient is here today for a follow up for cough, congestion, ear pain.  Sx began last weekend with post nasal drip, cough, then progressed to wheezing, fatigue, worsening nasal congestion, says that he went to [facility] 3 days ago, treated with cefdinir and azithromycin (has 1 day left of azithromycin, about 4 days left of cefdinir) for bronchitis, now sx are worse, was also given steroid injection.  Sx reported worsening R ear pain, fullness, coughing all day/night no improvement with cough syrup they provided him, also with wheezing, chest tightness, SOB. Using at home nebulizer machine maybe 2x at the most.      Aortic atherosclerosis  Stable noted on previous CT chest 9/13/*24  Continue aspirin and statin therapy  Health Maintenance Due   Topic Date Due    Shingles Vaccine (1 of 2) Never done    TETANUS VACCINE  11/01/2021    COVID-19 Vaccine (4 - 2024-25 season) 09/01/2024     Review of Systems   Constitutional:  Positive for chills and fatigue.   HENT:  Positive for congestion, postnasal drip, rhinorrhea, sinus pressure, sinus pain and sore throat.    Respiratory:  Positive for cough, chest tightness, shortness of breath and wheezing.        Objective:     Vitals:    10/04/24 0956   BP: 112/70   BP Location: Left arm   Patient Position: Sitting   Pulse: 70   Resp: 20   Temp: 99.3 °F (37.4 °C)   TempSrc: Temporal   SpO2: (!) 94%   Weight: 66.8 kg (147 lb 4.8 oz)   Height: 5' 2" (1.575 m)     Physical Exam  Vitals and nursing note reviewed.   Constitutional:       General: He is in acute distress.      Appearance: He is ill-appearing.   HENT:      Head: Normocephalic and atraumatic.      Ears:      Comments: R TM is red, bulging with effusion posterior to it       Nose: Congestion and rhinorrhea present.      Mouth/Throat:      Mouth: Mucous membranes are moist.      Pharynx: Posterior oropharyngeal erythema " present.   Eyes:      Pupils: Pupils are equal, round, and reactive to light.   Cardiovascular:      Rate and Rhythm: Normal rate and regular rhythm.   Pulmonary:      Effort: Respiratory distress present.      Breath sounds: Wheezing present.   Musculoskeletal:      Cervical back: Neck supple.   Lymphadenopathy:      Cervical: No cervical adenopathy.   Neurological:      Mental Status: He is alert.       Assessment/Plan:     Patient with COPD exacerbation, CXR done 3 days ago without acute consolidation, was treated as CAP in [facility] 3 days ago with sx worsening and now with otitis media to R ear as well.  Nebulizer machine treatment in office x 1 today  D/c azithromycin and cefdinir  Change to levaquin 750 mg po daily x 7 days  Prednisone burst dose provided  Nebulizer machine treat at home 3 times during day for 3 days, then 2 times during day for 3 days, then can stop if sx are better and use PRN at that point  Hycodan cough syrup for cough suppression provided  If sx fail to improve/worsen he needs to go to nearest ED for evaluation and treatment  Patient expressed understanding      1. COPD exacerbation  Patient with COPD exacerbation, CXR done 3 days ago without acute consolidation, was treated as CAP in [facility] 3 days ago with sx worsening and now with otitis media to R ear as well.  Nebulizer machine treatment in office x 1 today  D/c azithromycin and cefdinir  Change to levaquin 750 mg po daily x 7 days  Prednisone burst dose provided  Nebulizer machine treat at home 3 times during day for 3 days, then 2 times during day for 3 days, then can stop if sx are better and use PRN at that point  Hycodan cough syrup for cough suppression provided  If sx fail to improve/worsen he needs to go to nearest ED for evaluation and treatment  Patient expressed understanding  2. Right otitis media with effusion  Patient with COPD exacerbation, CXR done 3 days ago without acute consolidation, was treated as CAP in [facility] 3 days ago  with sx worsening and now with otitis media to R ear as well.  Nebulizer machine treatment in office x 1 today  D/c azithromycin and cefdinir  Change to levaquin 750 mg po daily x 7 days  Prednisone burst dose provided  Nebulizer machine treat at home 3 times during day for 3 days, then 2 times during day for 3 days, then can stop if sx are better and use PRN at that point  Hycodan cough syrup for cough suppression provided  If sx fail to improve/worsen he needs to go to nearest ED for evaluation and treatment  Patient expressed understanding  3. Aortic atherosclerosis  Stable noted on previous CT chest 9/13/*24  Continue aspirin and statin therapy  Other orders  -     predniSONE (DELTASONE) 20 MG tablet; Take two tablets by mouth on day 1,2,3, then reduce to one tablet by mouth day 4,5,6,7 then stop.  Dispense: 10 tablet; Refill: 0  -     albuterol-ipratropium (DUO-NEB) 2.5 mg-0.5 mg/3 mL nebulizer solution; Take 3 mLs by nebulization every 6 (six) hours as needed for Wheezing. Rescue  Dispense: 75 mL; Refill: 3  -     levoFLOXacin (LEVAQUIN) 750 MG tablet; Take 1 tablet (750 mg total) by mouth once daily. for 10 days  Dispense: 10 tablet; Refill: 0  -     hydrocodone-homatropine 5-1.5 mg/5 ml (HYCODAN) 5-1.5 mg/5 mL Syrp; Take 5 mLs by mouth every 6 (six) hours as needed (cough).  Dispense: 473 mL; Refill: 0       Follow up if symptoms worsen or fail to improve.    I spent a total of 25 minutes on the day of the visit.This includes face to face time and non-face to face time preparing to see the patient (eg, review of tests), obtaining and/or reviewing separately obtained history, documenting clinical information in the electronic or other health record, independently interpreting results and communicating results to the patient/family/caregiver, or care coordinator.

## 2024-10-04 NOTE — TELEPHONE ENCOUNTER
Please call patient as he did text me on my personal cellphone regarding his recent ED visit. I was able to review his chart, please ask patient if his sx are not improving from his respiratory illness that he will need to be seen in person for treatment, we did have cancellation today at 945 if he would like to come in then, he can.  thanks

## 2024-10-18 ENCOUNTER — PATIENT MESSAGE (OUTPATIENT)
Dept: ADMINISTRATIVE | Facility: CLINIC | Age: 74
End: 2024-10-18
Payer: MEDICARE

## 2024-10-18 ENCOUNTER — PATIENT OUTREACH (OUTPATIENT)
Dept: ADMINISTRATIVE | Facility: CLINIC | Age: 74
End: 2024-10-18
Payer: MEDICARE

## 2024-10-18 NOTE — PROGRESS NOTES
C3 nurse attempted to contact Reji Dozier  for a TCC post hospital discharge follow up call. No answer. Left voicemail with callback information. The patient has a scheduled HOSFU appointment with Sandra Leal NP on 10/22/2024 @ 8 am.     Message sent via patient's portal regarding follow up call.

## 2024-10-18 NOTE — PROGRESS NOTES
C3 nurse spoke with Reji Dozier  for a TCC post hospital discharge follow up call. The patient has a scheduled HOS appointment with Sandra Leal NP on 10/22/2024 @ 8 am.

## 2024-10-22 ENCOUNTER — OFFICE VISIT (OUTPATIENT)
Dept: FAMILY MEDICINE | Facility: CLINIC | Age: 74
End: 2024-10-22
Payer: MEDICARE

## 2024-10-22 VITALS
TEMPERATURE: 97 F | HEART RATE: 75 BPM | RESPIRATION RATE: 19 BRPM | SYSTOLIC BLOOD PRESSURE: 127 MMHG | BODY MASS INDEX: 25.89 KG/M2 | WEIGHT: 140.69 LBS | DIASTOLIC BLOOD PRESSURE: 73 MMHG | HEIGHT: 62 IN | OXYGEN SATURATION: 97 %

## 2024-10-22 DIAGNOSIS — Z09 HOSPITAL DISCHARGE FOLLOW-UP: Primary | ICD-10-CM

## 2024-10-22 NOTE — PROGRESS NOTES
Subjective:      Patient ID: Reji Dozier is a 74 y.o. White male      Chief Complaint: Hospital F/U       Past Medical History:   Diagnosis Date    Actinic keratosis     Arthritis     hip    Asthma     Cervicalgia     Chronic back pain     COPD (chronic obstructive pulmonary disease)     Disc displacement, lumbar     Dyslipidemia     Elevated PSA measurement     Hyperlipidemia     Lower extremity weakness     Lumbar radiculitis     Lumbar radiculopathy     Lumbar spinal stenosis     Mixed hyperlipidemia     Muscle spasm of back     Nocturia     Pain in thoracic spine     Radicular low back pain     Segmental and somatic dysfunction of cervical region     Segmental and somatic dysfunction of lumbar region     Segmental and somatic dysfunction of thoracic region     Skin lesion     Solar elastosis     Umbilical hernia     Vertebrogenic low back pain         HPI  Presents to clinic for hospital discharge follow up.    Presented to ED on 10/11/2024 with complaints of ABD pain after eating. CT scan which revealed dilated fluid-filled loops of small bowel with decompressed loops distally with transition point seen in the lower central abdomen compatible with small bowel obstruction.  Mild free fluid in the pelvis.  Pt admitted for SBO.  General surgery consulted.  NGT placed. Enemas started.  Patient eventually had BM and diet advanced.  Discharged home in stable condition on 10/14/2024.      Overall, doing well.  States BM on regular basis.  Denies any abdominal pain, nausea, vomiting, constipation, or diarrhea.    Of note, pt will be undergoing lumbar back surgery per Dr. Leo 10/28/24.          Patient Care Team:  Wilton Welch MD as PCP - General (Internal Medicine)  Wilton Welch MD      Review of Systems   Constitutional:  Negative for chills, fatigue, fever and unexpected weight change.   HENT: Negative.     Eyes: Negative.    Respiratory: Negative.  Negative for shortness of breath.     Cardiovascular: Negative.  Negative for chest pain.   Gastrointestinal: Negative.    Endocrine: Negative.    Genitourinary: Negative.    Musculoskeletal: Negative.    Integumentary:  Negative.   Allergic/Immunologic: Negative.    Neurological: Negative.  Negative for weakness.   Hematological: Negative.    Psychiatric/Behavioral: Negative.     All other systems reviewed and are negative.          Objective:      Vitals:    10/22/24 0802   BP: 127/73   Pulse: 75   Resp: 19   Temp: 97.3 °F (36.3 °C)      Body mass index is 25.73 kg/m².     Physical Exam  Vitals reviewed.   Constitutional:       Appearance: He is not toxic-appearing.   HENT:      Head: Normocephalic.      Mouth/Throat:      Mouth: Mucous membranes are moist.   Eyes:      Extraocular Movements: Extraocular movements intact.      Pupils: Pupils are equal, round, and reactive to light.   Cardiovascular:      Rate and Rhythm: Normal rate and regular rhythm.      Pulses: Normal pulses.      Heart sounds: Normal heart sounds.   Pulmonary:      Effort: Pulmonary effort is normal. No respiratory distress.      Breath sounds: Normal breath sounds.   Abdominal:      General: Bowel sounds are normal. There is no distension.      Palpations: Abdomen is soft.      Tenderness: There is no abdominal tenderness.   Musculoskeletal:         General: No tenderness. Normal range of motion.      Cervical back: Neck supple.   Skin:     General: Skin is warm and dry.   Neurological:      Mental Status: He is alert and oriented to person, place, and time.   Psychiatric:         Mood and Affect: Mood normal.            Current Outpatient Medications:     albuterol (PROVENTIL/VENTOLIN HFA) 90 mcg/actuation inhaler, Inhale 2 puffs into the lungs every 6 (six) hours as needed for Wheezing., Disp: 18 g, Rfl: 3    albuterol-ipratropium (DUO-NEB) 2.5 mg-0.5 mg/3 mL nebulizer solution, Take 3 mLs by nebulization every 6 (six) hours as needed for Wheezing. Rescue, Disp: 75 mL, Rfl:  3    ascorbic acid, vitamin C, 250 mg Chew, Take 1 tablet by mouth once daily. (Patient not taking: Reported on 10/18/2024), Disp: , Rfl:     aspirin/salicylamide/caffeine (BC HEADACHE POWDER ORAL), Take 1 tablet by mouth as needed. (Patient not taking: Reported on 10/18/2024), Disp: , Rfl:     atorvastatin (LIPITOR) 10 MG tablet, Take 1 tablet (10 mg total) by mouth every evening., Disp: 90 tablet, Rfl: 3    benzonatate (TESSALON) 200 MG capsule, Take 1 capsule (200 mg total) by mouth 3 (three) times daily as needed for Cough., Disp: 30 capsule, Rfl: 5    fluticasone propionate (FLONASE) 50 mcg/actuation nasal spray, USE 2 SPRAYS INTO EACH NOSTRIL TWICE A DAY, Disp: 96 mL, Rfl: 1    fluticasone-umeclidin-vilanter (TRELEGY ELLIPTA) 200-62.5-25 mcg inhaler, INHALE 1 PUFF INTO THE LUNGS ONCE DAILY., Disp: 180 each, Rfl: 1    hydrocodone-homatropine 5-1.5 mg/5 ml (HYCODAN) 5-1.5 mg/5 mL Syrp, Take 5 mLs by mouth every 6 (six) hours as needed (cough)., Disp: 473 mL, Rfl: 0    ipratropium (ATROVENT) 0.02 % nebulizer solution, Take 2.5 mLs (500 mcg total) by nebulization 3 (three) times daily., Disp: 90 mL, Rfl: 3    multivitamin capsule, Take 1 capsule by mouth once daily., Disp: , Rfl:     pantoprazole (PROTONIX) 20 MG tablet, Take 1 tablet (20 mg total) by mouth once daily. (Patient not taking: Reported on 10/18/2024), Disp: 90 tablet, Rfl: 3    predniSONE (DELTASONE) 20 MG tablet, Take two tablets by mouth on day 1,2,3, then reduce to one tablet by mouth day 4,5,6,7 then stop. (Patient not taking: Reported on 10/18/2024), Disp: 10 tablet, Rfl: 0    tiZANidine (ZANAFLEX) 2 MG tablet, TAKE 1 TABLET BY MOUTH EVERY 8 HOURS AS NEEDED FOR MUSCLE SPASM (Patient not taking: Reported on 10/18/2024), Disp: 90 tablet, Rfl: 1    vitamin D (VITAMIN D3) 1000 units Tab, Take 1,000 Units by mouth once daily. (Patient not taking: Reported on 10/18/2024), Disp: , Rfl:   No current facility-administered medications for this  visit.    Facility-Administered Medications Ordered in Other Visits:     0.9%  NaCl infusion, , Intravenous, Continuous, Deep Pacheco MD    lactated ringers infusion, , Intravenous, Continuous, Deep Pacheco MD    Assessment & Plan:     Problem List Items Addressed This Visit          Other    Hospital discharge follow-up - Primary     S/P SBO  Med rec completed  Instructed to continue to monitor symptoms  Report to ED for any CP, SOB, and/or worsening symptoms  Pt is agreeable to plan and verbalizes understanding                 Transitional Care Note    Family and/or Caretaker present at visit?  Yes.  Diagnostic tests reviewed/disposition: No diagnosic tests pending after this hospitalization.  Disease/illness education: ED precautions  Home health/community services discussion/referrals: Patient does not have home health established from hospital visit.  They do not need home health.  If needed, we will set up home health for the patient.   Establishment or re-establishment of referral orders for community resources: No other necessary community resources.   Discussion with other health care providers: No discussion with other health care providers necessary.

## 2024-10-22 NOTE — ASSESSMENT & PLAN NOTE
S/P SBO  Med rec completed  Instructed to continue to monitor symptoms  Report to ED for any CP, SOB, and/or worsening symptoms  Pt is agreeable to plan and verbalizes understanding

## 2024-10-29 ENCOUNTER — TELEPHONE (OUTPATIENT)
Dept: FAMILY MEDICINE | Facility: CLINIC | Age: 74
End: 2024-10-29
Payer: MEDICARE

## 2024-10-30 ENCOUNTER — TELEPHONE (OUTPATIENT)
Dept: FAMILY MEDICINE | Facility: CLINIC | Age: 74
End: 2024-10-30
Payer: MEDICARE

## 2024-11-22 DIAGNOSIS — E78.2 MIXED HYPERLIPIDEMIA: ICD-10-CM

## 2024-11-22 RX ORDER — ATORVASTATIN CALCIUM 10 MG/1
10 TABLET, FILM COATED ORAL NIGHTLY
Qty: 90 TABLET | Refills: 2 | Status: SHIPPED | OUTPATIENT
Start: 2024-11-22

## 2024-11-26 ENCOUNTER — TELEPHONE (OUTPATIENT)
Dept: FAMILY MEDICINE | Facility: CLINIC | Age: 74
End: 2024-11-26
Payer: MEDICARE

## 2024-11-26 DIAGNOSIS — Z12.5 ENCOUNTER FOR SCREENING FOR MALIGNANT NEOPLASM OF PROSTATE: ICD-10-CM

## 2024-11-26 DIAGNOSIS — E78.2 MIXED HYPERLIPIDEMIA: ICD-10-CM

## 2024-11-26 DIAGNOSIS — I70.0 AORTIC ATHEROSCLEROSIS: ICD-10-CM

## 2024-11-26 DIAGNOSIS — Z00.00 WELLNESS EXAMINATION: Primary | ICD-10-CM

## 2024-11-26 DIAGNOSIS — R97.20 ELEVATED PSA: ICD-10-CM

## 2024-11-26 DIAGNOSIS — C61 MALIGNANT NEOPLASM PROSTATE: ICD-10-CM

## 2024-11-26 NOTE — TELEPHONE ENCOUNTER
----- Message from Sumit sent at 11/26/2024  1:12 PM CST -----  .Who Called: Reji Dozier    What order is the patient requesting: [facility]    When does the expect the orders to be performed? Tomorrow morning    Preferred Method of Contact: Phone Call    Patient's Preferred Phone Number on File: [phone]     Best Call Back Number, if different:    Additional Information: Pt would like a cb once labs are placed. Please advise, thank you.

## 2024-11-29 ENCOUNTER — LAB VISIT (OUTPATIENT)
Dept: LAB | Facility: HOSPITAL | Age: 74
End: 2024-11-29
Attending: INTERNAL MEDICINE
Payer: MEDICARE

## 2024-11-29 DIAGNOSIS — Z00.00 WELLNESS EXAMINATION: ICD-10-CM

## 2024-11-29 DIAGNOSIS — R97.20 ELEVATED PSA: ICD-10-CM

## 2024-11-29 DIAGNOSIS — I70.0 AORTIC ATHEROSCLEROSIS: ICD-10-CM

## 2024-11-29 DIAGNOSIS — C61 MALIGNANT NEOPLASM PROSTATE: ICD-10-CM

## 2024-11-29 DIAGNOSIS — Z12.5 ENCOUNTER FOR SCREENING FOR MALIGNANT NEOPLASM OF PROSTATE: ICD-10-CM

## 2024-11-29 DIAGNOSIS — E78.2 MIXED HYPERLIPIDEMIA: ICD-10-CM

## 2024-11-29 LAB
ALBUMIN SERPL-MCNC: 3.7 G/DL (ref 3.4–4.8)
ALBUMIN/GLOB SERPL: 1.1 RATIO (ref 1.1–2)
ALP SERPL-CCNC: 136 UNIT/L (ref 40–150)
ALT SERPL-CCNC: 14 UNIT/L (ref 0–55)
ANION GAP SERPL CALC-SCNC: 7 MEQ/L
AST SERPL-CCNC: 16 UNIT/L (ref 5–34)
BASOPHILS # BLD AUTO: 0.03 X10(3)/MCL
BASOPHILS NFR BLD AUTO: 0.4 %
BILIRUB SERPL-MCNC: 0.4 MG/DL
BILIRUB UR QL STRIP.AUTO: NEGATIVE
BUN SERPL-MCNC: 7.4 MG/DL (ref 8.4–25.7)
CALCIUM SERPL-MCNC: 9.8 MG/DL (ref 8.8–10)
CHLORIDE SERPL-SCNC: 106 MMOL/L (ref 98–107)
CHOLEST SERPL-MCNC: 168 MG/DL
CHOLEST/HDLC SERPL: 4 {RATIO} (ref 0–5)
CLARITY UR: CLEAR
CO2 SERPL-SCNC: 28 MMOL/L (ref 23–31)
COLOR UR AUTO: YELLOW
CREAT SERPL-MCNC: 0.75 MG/DL (ref 0.72–1.25)
CREAT/UREA NIT SERPL: 10
EOSINOPHIL # BLD AUTO: 0.42 X10(3)/MCL (ref 0–0.9)
EOSINOPHIL NFR BLD AUTO: 5.4 %
ERYTHROCYTE [DISTWIDTH] IN BLOOD BY AUTOMATED COUNT: 14 % (ref 11.5–17)
GFR SERPLBLD CREATININE-BSD FMLA CKD-EPI: >60 ML/MIN/1.73/M2
GLOBULIN SER-MCNC: 3.5 GM/DL (ref 2.4–3.5)
GLUCOSE SERPL-MCNC: 111 MG/DL (ref 82–115)
GLUCOSE UR QL STRIP: NEGATIVE
HCT VFR BLD AUTO: 42.4 % (ref 42–52)
HDLC SERPL-MCNC: 46 MG/DL (ref 35–60)
HGB BLD-MCNC: 13.6 G/DL (ref 14–18)
HGB UR QL STRIP: NEGATIVE
IMM GRANULOCYTES # BLD AUTO: 0.02 X10(3)/MCL (ref 0–0.04)
IMM GRANULOCYTES NFR BLD AUTO: 0.3 %
KETONES UR QL STRIP: NEGATIVE
LDLC SERPL CALC-MCNC: 96 MG/DL (ref 50–140)
LEUKOCYTE ESTERASE UR QL STRIP: NEGATIVE
LYMPHOCYTES # BLD AUTO: 2.18 X10(3)/MCL (ref 0.6–4.6)
LYMPHOCYTES NFR BLD AUTO: 28.1 %
MCH RBC QN AUTO: 29.2 PG (ref 27–31)
MCHC RBC AUTO-ENTMCNC: 32.1 G/DL (ref 33–36)
MCV RBC AUTO: 91 FL (ref 80–94)
MONOCYTES # BLD AUTO: 0.81 X10(3)/MCL (ref 0.1–1.3)
MONOCYTES NFR BLD AUTO: 10.5 %
NEUTROPHILS # BLD AUTO: 4.29 X10(3)/MCL (ref 2.1–9.2)
NEUTROPHILS NFR BLD AUTO: 55.3 %
NITRITE UR QL STRIP: NEGATIVE
NRBC BLD AUTO-RTO: 0 %
PH UR STRIP: 6 [PH]
PLATELET # BLD AUTO: 288 X10(3)/MCL (ref 130–400)
PMV BLD AUTO: 9.9 FL (ref 7.4–10.4)
POTASSIUM SERPL-SCNC: 4.7 MMOL/L (ref 3.5–5.1)
PROT SERPL-MCNC: 7.2 GM/DL (ref 5.8–7.6)
PROT UR QL STRIP: NEGATIVE
RBC # BLD AUTO: 4.66 X10(6)/MCL (ref 4.7–6.1)
SODIUM SERPL-SCNC: 141 MMOL/L (ref 136–145)
SP GR UR STRIP.AUTO: 1.01 (ref 1–1.03)
TRIGL SERPL-MCNC: 128 MG/DL (ref 34–140)
UROBILINOGEN UR STRIP-ACNC: 0.2
VLDLC SERPL CALC-MCNC: 26 MG/DL
WBC # BLD AUTO: 7.75 X10(3)/MCL (ref 4.5–11.5)

## 2024-11-29 PROCEDURE — 80053 COMPREHEN METABOLIC PANEL: CPT

## 2024-11-29 PROCEDURE — 85025 COMPLETE CBC W/AUTO DIFF WBC: CPT

## 2024-11-29 PROCEDURE — 36415 COLL VENOUS BLD VENIPUNCTURE: CPT

## 2024-11-29 PROCEDURE — 80061 LIPID PANEL: CPT

## 2024-11-29 PROCEDURE — 81003 URINALYSIS AUTO W/O SCOPE: CPT

## 2024-12-06 ENCOUNTER — OFFICE VISIT (OUTPATIENT)
Dept: FAMILY MEDICINE | Facility: CLINIC | Age: 74
End: 2024-12-06
Payer: MEDICARE

## 2024-12-06 VITALS
SYSTOLIC BLOOD PRESSURE: 124 MMHG | HEIGHT: 62 IN | RESPIRATION RATE: 18 BRPM | WEIGHT: 133.19 LBS | DIASTOLIC BLOOD PRESSURE: 78 MMHG | TEMPERATURE: 99 F | HEART RATE: 74 BPM | OXYGEN SATURATION: 96 % | BODY MASS INDEX: 24.51 KG/M2

## 2024-12-06 DIAGNOSIS — I70.0 ATHEROSCLEROSIS OF AORTA: ICD-10-CM

## 2024-12-06 DIAGNOSIS — J44.9 MODERATE COPD (CHRONIC OBSTRUCTIVE PULMONARY DISEASE): ICD-10-CM

## 2024-12-06 DIAGNOSIS — Z00.00 WELLNESS EXAMINATION: Primary | ICD-10-CM

## 2024-12-06 DIAGNOSIS — G89.29 CHRONIC MIDLINE LOW BACK PAIN WITHOUT SCIATICA: ICD-10-CM

## 2024-12-06 DIAGNOSIS — E78.2 MIXED HYPERLIPIDEMIA: ICD-10-CM

## 2024-12-06 DIAGNOSIS — K40.21 BILATERAL RECURRENT INGUINAL HERNIA WITHOUT OBSTRUCTION OR GANGRENE: ICD-10-CM

## 2024-12-06 DIAGNOSIS — M54.50 CHRONIC MIDLINE LOW BACK PAIN WITHOUT SCIATICA: ICD-10-CM

## 2024-12-06 PROBLEM — Z09 HOSPITAL DISCHARGE FOLLOW-UP: Status: RESOLVED | Noted: 2024-10-22 | Resolved: 2024-12-06

## 2024-12-06 NOTE — PROGRESS NOTES
Patient ID: [id number]     Chief Complaint: Medicare AWV Follow Up      HPI:     Reji Dozier is a 74 y.o. male here today for a Medicare Wellness.     Doing well today  Back pain is present but improving  Still using pain med  Having regular BM's with laxatives  No inguinal pain reported    Chronic Constipation, Inguinal Hernia bilateral  CT abdomen and pelvis 11/16/2024 from [facility] reviewed  . Findings: Prior lumbar fusion and bilateral hip arthroplasty. The visualized lungs demonstrate emphysema. Calcified right lower lobe granuloma. There is no free intraperitoneal air. The noncontrasted liver is without focal lesion. Single gallstone is present within the gallbladder. There are multiple splenic granulomas. The pancreas and pancreatic duct are unremarkable. The adrenal glands are intact. There is no evidence for obstructive uropathy. Atherosclerosis is severe. No retroperitoneal or mesenteric adenopathy. The stomach is normal. There is severe diverticulosis. Stool burden is moderate. The appendix is normal. No small bowel obstruction. There is a left inguinal hernia containing a small portion of:. There is a right inguinal hernia containing portion of the appendix. No small bowel obstruction. There is fecalization of small bowel. The majority of the pelvis is obscured by the patient's hip arthroplasties.     Left inguinal hernia contains portion of colon. Right inguinal hernia contains portion of the appendix. No evidence for bowel obstruction. Fecalization of small bowel may reflect dysmotility. Diverticulosis. Cholelithiasis. Severe atherosclerosis.   Patient has had two hospitalization for constipation, one of them a SBO that was decompressed with NGT over past few months  Hernia was reducible  Suspected due to severe constipation problems      COPD: compliant with Trelegy, sx controlled    HLD: compliant with statin, no myalgias    Lower back pain and RLE radiculopathy:  S/p FABY with Dr. Hdz and  goes to chiropracter PRN  Patient says he cancelled last injection since it didn't work  I referred patient to see Dr. George for second opinion on conservative and to Dr. Joshua with NS management of pain, he recommend NS evaluation, referred him to Dr. Leo who has recommended him surgery for him  Status post lower back surgery with Dr. Leo for lumbar stenosis 10/28/2024       Elevated PSA: last OV note reviewed Dr. [facility] urology, patient says at last OV was told his prostate biospy samples was normal and some confusion about 6 month or 1 year follow up   Repeat PSA now <1    flu shot: due at local pharmacy 2023  COVID 19 vaccine: x 3 doses  Pneumovax and Prevnar completed  RSV education provided   Ct lung cancer screening: ordered for 9/2024    Opioid Screening: Patient medication list reviewed, patient is taking prescription opioids. Patient is not using additional opioids than prescribed. Patient is at low risk of substance abuse based on this opioid use history.       -------------------------------------    Actinic keratosis    Arthritis    hip    Asthma    Cervicalgia    Chronic back pain    COPD (chronic obstructive pulmonary disease)    Disc displacement, lumbar    Dyslipidemia    Elevated PSA measurement    Hyperlipidemia    Lower extremity weakness    Lumbar radiculitis    Lumbar radiculopathy    Lumbar spinal stenosis    Mixed hyperlipidemia    Muscle spasm of back    Nocturia    Pain in thoracic spine    Radicular low back pain    Segmental and somatic dysfunction of cervical region    Segmental and somatic dysfunction of lumbar region    Segmental and somatic dysfunction of thoracic region    Skin lesion    Solar elastosis    Umbilical hernia    Vertebrogenic low back pain        Past Surgical History:   Procedure Laterality Date    COLONOSCOPY      COLONOSCOPY  05/22/2018    Kenny Baker MD    control bleeding      of gastrointestinal tract    ESOPHAGOGASTRODUODENOSCOPY       HEMORRHOID SURGERY      HERNIA REPAIR      INJECTION OF FACET JOINT Bilateral     SCLEROTHERAPY WITH ULTRASOUND GUIDANCE      TOTAL HIP ARTHROPLASTY Right     TOTAL HIP ARTHROPLASTY Left     TRANSFORAMINAL EPIDURAL INJECTION OF STEROID Bilateral 05/04/2022    Procedure: INJECTION, STEROID, EPIDURAL, TRANSFORAMINAL APPROACH L3;  Surgeon: Bronwyn Hdz MD;  Location: [facility] OR;  Service: Pain Management;  Laterality: Bilateral;    TRANSFORAMINAL EPIDURAL INJECTION OF STEROID Bilateral 09/14/2022    Procedure: Injection,steroid,epidural,transforaminal approach;  Surgeon: Bronwyn Hdz MD;  Location: [facility] OR;  Service: Pain Management;  Laterality: Bilateral;  L3    TRANSFORAMINAL EPIDURAL INJECTION OF STEROID Bilateral 10/26/2022    Procedure: Injection,steroid,epidural,transforaminal approach;  Surgeon: Bronwyn Hdz MD;  Location: [facility] OR;  Service: Pain Management;  Laterality: Bilateral;  L3    TRANSFORAMINAL EPIDURAL INJECTION OF STEROID Bilateral 03/15/2023    Procedure: Injection,steroid,epidural,transforaminal approach;  Surgeon: Bronwyn Hdz MD;  Location: [facility] OR;  Service: Pain Management;  Laterality: Bilateral;  L3       Review of patient's allergies indicates:   Allergen Reactions    Adhesive     Latex, natural rubber        Outpatient Medications Marked as Taking for the 12/6/24 encounter (Office Visit) with Wilton Welch MD   Medication Sig Dispense Refill    albuterol (PROVENTIL/VENTOLIN HFA) 90 mcg/actuation inhaler Inhale 2 puffs into the lungs every 6 (six) hours as needed for Wheezing. 18 g 3    albuterol-ipratropium (DUO-NEB) 2.5 mg-0.5 mg/3 mL nebulizer solution Take 3 mLs by nebulization every 6 (six) hours as needed for Wheezing. Rescue 75 mL 3    atorvastatin (LIPITOR) 10 MG tablet TAKE 1 TABLET BY MOUTH EVERY DAY IN THE EVENING 90 tablet 2    fluticasone propionate (FLONASE) 50 mcg/actuation nasal spray USE 2 SPRAYS INTO EACH NOSTRIL TWICE A DAY 96 mL 1     fluticasone-umeclidin-vilanter (TRELEGY ELLIPTA) 200-62.5-25 mcg inhaler INHALE 1 PUFF INTO THE LUNGS ONCE DAILY. 180 each 1       Social History     Socioeconomic History    Marital status:    Tobacco Use    Smoking status: Former     Types: Cigarettes    Smokeless tobacco: Never   Substance and Sexual Activity    Alcohol use: Yes     Alcohol/week: 1.0 standard drink of alcohol     Types: 1 Cans of beer per week     Comment: Daily    Drug use: Never    Sexual activity: Yes     Social Drivers of Health     Financial Resource Strain: Low Risk  (10/28/2024)    [facility]    Overall Financial Resource Strain (CARDIA)     Difficulty of Paying Living Expenses: Not hard at all   Transportation Needs: No Transportation Needs (10/28/2024)    [facility]    PRAPARE - Transportation     Lack of Transportation (Medical): No     Lack of Transportation (Non-Medical): No   Housing Stability: Unknown (10/28/2024)    [facility]    Housing Stability Vital Sign     Unable to Pay for Housing in the Last Year: No     Number of Times Moved in the Last Year: 1        Family History   Family history unknown: Yes        Patient Care Team:  Wilton Welch MD as PCP - General (Internal Medicine)  Wilton Welch MD       Subjective:     Review of Systems   Constitutional:  Negative for chills and fever.   Eyes:  Negative for pain.   Gastrointestinal:  Negative for abdominal pain, constipation and diarrhea.   Musculoskeletal:  Positive for back pain.         Patient Reported Health Risk Assessment  What [age] your [age] 70-79  Are you male or female?: Male    Objective:     /78 (BP Location: Left arm, Patient Position: Sitting)   Pulse 74   Temp 98.5 °F (36.9 °C)  "(Temporal)   Resp 18   Ht 5' 2" (1.575 m)   Wt 60.4 kg (133 lb 3.2 oz)   SpO2 96%   BMI 24.36 kg/m²     Physical Exam  Vitals and nursing note reviewed.   Constitutional:       General: He is not in acute distress.     Appearance: Normal appearance. He is not ill-appearing.   HENT:      Head: Normocephalic and atraumatic.      Right Ear: Tympanic membrane normal.      Left Ear: Tympanic membrane normal.      Mouth/Throat:      Mouth: Mucous membranes are moist.   Eyes:      Pupils: Pupils are equal, round, and reactive to light.   Cardiovascular:      Rate and Rhythm: Normal rate and regular rhythm.   Pulmonary:      Effort: Pulmonary effort is normal. No respiratory distress.      Breath sounds: Normal breath sounds. No wheezing.   Abdominal:      General: Abdomen is flat. There is no distension.      Palpations: Abdomen is soft.      Tenderness: There is no abdominal tenderness. There is no guarding.   Musculoskeletal:      Cervical back: Normal range of motion and neck supple.      Right lower leg: No edema.      Left lower leg: No edema.   Lymphadenopathy:      Cervical: No cervical adenopathy.   Neurological:      Mental Status: He is alert.                No data to display                  12/6/2024     9:00 AM 10/22/2024     8:00 AM 10/4/2024     9:45 AM 6/6/2024    10:30 AM 12/28/2023     9:00 AM 12/11/2023    11:00 AM 12/5/2023     9:00 AM   Fall Risk Assessment - Outpatient   Mobility Status Ambulatory Ambulatory Ambulatory Ambulatory Ambulatory Ambulatory Ambulatory   Number of falls 0 0 0 0 0 0 0   Identified as fall risk False False False False False False False           Depression Screening  Over the past two weeks, has the patient felt down, depressed, or hopeless?: No  Over the past two weeks, has the patient felt little interest or pleasure in doing things?: No  Functional Ability/Safety Screening  Was the patient's timed Up & Go test unsteady or longer than 30 seconds?: No  Does the patient " need help with phone, transportation, shopping, preparing meals, housework, laundry, meds, or managing money?: No  Does the patient's home have rugs in the hallway, lack grab bars in the bathroom, lack handrails on the stairs or have poor lighting?: No  Have you noticed any hearing difficulties?: No  Cognitive Function (Assessed through direct observation with due consideration of information obtained by way of patient reports and/or concerns raised by family, friends, caretakers, or others)    Does the patient repeat questions/statements in the same day?: No  Does the patient have trouble remembering the date, year, and time?: No  Does the patient have difficulty managing finances?: No  Does the patient have a decreased sense of direction?: No  Assessment/Plan:     1. Wellness examination  Fasting labs reviewed  2. Bilateral recurrent inguinal hernia without obstruction or gangrene  Sx controlled, reducible  3. Chronic midline low back pain without sciatica  Stable improving  4. Mixed hyperlipidemia  LDL <100  The 10-year ASCVD risk score (Lucy HINKLE, et al., 2019) is: 20.1%    Values used to calculate the score:      Age: 74 years      Sex: Male      Is Non- : No      Diabetic: No      Tobacco smoker: No      Systolic Blood Pressure: 124 mmHg      Is BP treated: No      HDL Cholesterol: 46 mg/dL      Total Cholesterol: 168 mg/dL  Patient is at high risk for heart disease  Recommend cardiology evaluation  Referral placed   5. Moderate COPD (chronic obstructive pulmonary disease)  Stable sx controlled continue trelegy  6. Atherosclerosis of aorta  -     Ambulatory referral/consult to Cardiology; Future; Expected date: 12/13/2024  Continue statin therapy  High risk for heart disease  Recommend cardiology evaluation, referral placed          Medicare Annual Wellness and Personalized Prevention Plan:   Fall Risk + Home Safety + Hearing Impairment + Depression Screen + Opioid and Substance Abuse  Screening + Cognitive Impairment Screen + Health Risk Assessment all reviewed.     Health Maintenance Topics with due status: Not Due       Topic Last Completion Date    Colorectal Cancer Screening 05/22/2018    Lipid Panel 11/29/2024      The patient's Health Maintenance was reviewed and the following appears to be due at this time:   Health Maintenance Due   Topic Date Due    Shingles Vaccine (1 of 2) Never done    TETANUS VACCINE  11/01/2021    COVID-19 Vaccine (4 - 2024-25 season) 09/01/2024     Advance Care Planning     Date: 12/06/2024    ACP Reviewed/No Changes  Voluntary advance care planning discussion had today with patient. Previously completed HCPOA in electronic medical record is current, no changes made.      A total of 1 min was spent on advance care planning, goals of care discussion, emotional support, formulating and communicating prognosis and exploring burden/benefit of various approaches of treatment. This discussion occurred on a fully voluntary basis with the verbal consent of the patient and/or family.           Follow up in about 6 months (around 6/6/2025) for Follow Up - Chronic Conditions. In addition to their scheduled follow up, the patient has also been instructed to follow up on as needed basis.

## 2025-01-30 DIAGNOSIS — J45.909 ASTHMA DUE TO ENVIRONMENTAL ALLERGIES: ICD-10-CM

## 2025-01-30 DIAGNOSIS — J44.1 COPD WITH ACUTE EXACERBATION: ICD-10-CM

## 2025-01-30 RX ORDER — FLUTICASONE PROPIONATE 50 MCG
SPRAY, SUSPENSION (ML) NASAL
Qty: 48 ML | Refills: 3 | Status: SHIPPED | OUTPATIENT
Start: 2025-01-30

## 2025-01-30 RX ORDER — FLUTICASONE FUROATE, UMECLIDINIUM BROMIDE AND VILANTEROL TRIFENATATE 200; 62.5; 25 UG/1; UG/1; UG/1
1 POWDER RESPIRATORY (INHALATION) DAILY
Qty: 180 EACH | Refills: 3 | Status: SHIPPED | OUTPATIENT
Start: 2025-01-30

## 2025-05-27 ENCOUNTER — TELEPHONE (OUTPATIENT)
Dept: FAMILY MEDICINE | Facility: CLINIC | Age: 75
End: 2025-05-27
Payer: MEDICARE

## 2025-05-27 DIAGNOSIS — J44.9 MODERATE COPD (CHRONIC OBSTRUCTIVE PULMONARY DISEASE): ICD-10-CM

## 2025-05-27 DIAGNOSIS — R97.20 ELEVATED PSA: ICD-10-CM

## 2025-05-27 DIAGNOSIS — Z00.00 WELLNESS EXAMINATION: Primary | ICD-10-CM

## 2025-05-27 DIAGNOSIS — J45.909 ASTHMA DUE TO ENVIRONMENTAL ALLERGIES: Primary | ICD-10-CM

## 2025-05-27 DIAGNOSIS — M54.16 LUMBAR RADICULOPATHY: ICD-10-CM

## 2025-05-27 DIAGNOSIS — M48.061 SPINAL STENOSIS OF LUMBAR REGION, UNSPECIFIED WHETHER NEUROGENIC CLAUDICATION PRESENT: ICD-10-CM

## 2025-05-27 DIAGNOSIS — E78.2 MIXED HYPERLIPIDEMIA: ICD-10-CM

## 2025-05-27 DIAGNOSIS — J44.1 COPD EXACERBATION: ICD-10-CM

## 2025-05-27 DIAGNOSIS — J44.1 COPD WITH ACUTE EXACERBATION: ICD-10-CM

## 2025-05-27 RX ORDER — ALBUTEROL SULFATE 90 UG/1
2 INHALANT RESPIRATORY (INHALATION) EVERY 6 HOURS PRN
Qty: 18 G | Refills: 3 | Status: SHIPPED | OUTPATIENT
Start: 2025-05-27

## 2025-05-27 RX ORDER — IPRATROPIUM BROMIDE AND ALBUTEROL SULFATE 2.5; .5 MG/3ML; MG/3ML
3 SOLUTION RESPIRATORY (INHALATION) EVERY 6 HOURS PRN
Qty: 75 ML | Refills: 3 | Status: SHIPPED | OUTPATIENT
Start: 2025-05-27 | End: 2026-05-27

## 2025-05-27 NOTE — TELEPHONE ENCOUNTER
Copied from CRM #[id number]. Topic: General Inquiry - Patient Advice  >> May 27, 2025  9:19 AM Violet wrote:  Who Called: Reji Dozier    Caller is requesting assistance/information from provider's office.    Symptoms (please be specific): N/A     How long has patient had these symptoms:  N/A    List of preferred pharmacies on file (remove unneeded):     Preferred Method of Contact: Phone Call    Patient's Preferred Phone Number on File: [phone]     Best Call Back Number, if different:    Additional Information: pt states he has labs due, but there are no labs in his chart. Please advise pt if labs are due for his appt in June        Refill or New Rx:Refill     RX Name and Strength:    nebulizer solution - needs 1 box  Rescue inhaler - needs 1    How is the patient currently taking it? (ex. 1XDay): N/A    Is this a 30 day or 90 day RX:N/A    Local or Mail Order: LOCAL    List of preferred pharmacies on file (remove unneeded): [id number] - [place] - [address] AT [facility] AT [place]   Phone: [phone]  Fax: [phone]    Ordering Provider: YANE    Preferred Method of Contact: Phone Call    Patient's Preferred Phone Number on File: [phone]     Best Call Back Number, if different:    Additional Information: N/A

## 2025-05-28 ENCOUNTER — RESULTS FOLLOW-UP (OUTPATIENT)
Dept: FAMILY MEDICINE | Facility: CLINIC | Age: 75
End: 2025-05-28

## 2025-05-28 ENCOUNTER — LAB VISIT (OUTPATIENT)
Dept: LAB | Facility: HOSPITAL | Age: 75
End: 2025-05-28
Attending: INTERNAL MEDICINE
Payer: MEDICARE

## 2025-05-28 DIAGNOSIS — E78.2 MIXED HYPERLIPIDEMIA: ICD-10-CM

## 2025-05-28 DIAGNOSIS — Z00.00 WELLNESS EXAMINATION: ICD-10-CM

## 2025-05-28 DIAGNOSIS — M48.061 SPINAL STENOSIS OF LUMBAR REGION, UNSPECIFIED WHETHER NEUROGENIC CLAUDICATION PRESENT: ICD-10-CM

## 2025-05-28 DIAGNOSIS — R97.20 ELEVATED PSA: ICD-10-CM

## 2025-05-28 DIAGNOSIS — M54.16 LUMBAR RADICULOPATHY: ICD-10-CM

## 2025-05-28 LAB
ALBUMIN SERPL-MCNC: 3.7 G/DL (ref 3.4–4.8)
ALBUMIN/GLOB SERPL: 1.1 RATIO (ref 1.1–2)
ALP SERPL-CCNC: 81 UNIT/L (ref 40–150)
ALT SERPL-CCNC: 15 UNIT/L (ref 0–55)
ANION GAP SERPL CALC-SCNC: 9 MEQ/L
AST SERPL-CCNC: 21 UNIT/L (ref 11–45)
BASOPHILS # BLD AUTO: 0.02 X10(3)/MCL
BASOPHILS NFR BLD AUTO: 0.3 %
BILIRUB SERPL-MCNC: 0.4 MG/DL
BILIRUB UR QL STRIP.AUTO: NEGATIVE
BUN SERPL-MCNC: 14.3 MG/DL (ref 8.4–25.7)
CALCIUM SERPL-MCNC: 9 MG/DL (ref 8.8–10)
CHLORIDE SERPL-SCNC: 107 MMOL/L (ref 98–107)
CHOLEST SERPL-MCNC: 187 MG/DL
CHOLEST/HDLC SERPL: 3 {RATIO} (ref 0–5)
CLARITY UR: CLEAR
CO2 SERPL-SCNC: 26 MMOL/L (ref 23–31)
COLOR UR AUTO: YELLOW
CREAT SERPL-MCNC: 0.93 MG/DL (ref 0.72–1.25)
CREAT/UREA NIT SERPL: 15
EOSINOPHIL # BLD AUTO: 0.2 X10(3)/MCL (ref 0–0.9)
EOSINOPHIL NFR BLD AUTO: 2.7 %
ERYTHROCYTE [DISTWIDTH] IN BLOOD BY AUTOMATED COUNT: 15.5 % (ref 11.5–17)
GFR SERPLBLD CREATININE-BSD FMLA CKD-EPI: >60 ML/MIN/1.73/M2
GLOBULIN SER-MCNC: 3.4 GM/DL (ref 2.4–3.5)
GLUCOSE SERPL-MCNC: 105 MG/DL (ref 82–115)
GLUCOSE UR QL STRIP: NEGATIVE
HCT VFR BLD AUTO: 45.4 % (ref 42–52)
HDLC SERPL-MCNC: 60 MG/DL (ref 35–60)
HGB BLD-MCNC: 15.5 G/DL (ref 14–18)
HGB UR QL STRIP: NEGATIVE
IMM GRANULOCYTES # BLD AUTO: 0.02 X10(3)/MCL (ref 0–0.04)
IMM GRANULOCYTES NFR BLD AUTO: 0.3 %
KETONES UR QL STRIP: NEGATIVE
LDLC SERPL CALC-MCNC: 109 MG/DL (ref 50–140)
LEUKOCYTE ESTERASE UR QL STRIP: NEGATIVE
LYMPHOCYTES # BLD AUTO: 2.21 X10(3)/MCL (ref 0.6–4.6)
LYMPHOCYTES NFR BLD AUTO: 30.3 %
MCH RBC QN AUTO: 30.8 PG (ref 27–31)
MCHC RBC AUTO-ENTMCNC: 34.1 G/DL (ref 33–36)
MCV RBC AUTO: 90.1 FL (ref 80–94)
MONOCYTES # BLD AUTO: 0.7 X10(3)/MCL (ref 0.1–1.3)
MONOCYTES NFR BLD AUTO: 9.6 %
NEUTROPHILS # BLD AUTO: 4.15 X10(3)/MCL (ref 2.1–9.2)
NEUTROPHILS NFR BLD AUTO: 56.8 %
NITRITE UR QL STRIP: NEGATIVE
NRBC BLD AUTO-RTO: 0 %
PH UR STRIP: 6 [PH]
PLATELET # BLD AUTO: 197 X10(3)/MCL (ref 130–400)
PMV BLD AUTO: 10.7 FL (ref 7.4–10.4)
POTASSIUM SERPL-SCNC: 4.2 MMOL/L (ref 3.5–5.1)
PROT SERPL-MCNC: 7.1 GM/DL (ref 5.8–7.6)
PROT UR QL STRIP: NEGATIVE
RBC # BLD AUTO: 5.04 X10(6)/MCL (ref 4.7–6.1)
SODIUM SERPL-SCNC: 142 MMOL/L (ref 136–145)
SP GR UR STRIP.AUTO: 1.02 (ref 1–1.03)
TRIGL SERPL-MCNC: 89 MG/DL (ref 34–140)
UROBILINOGEN UR STRIP-ACNC: 0.2
VLDLC SERPL CALC-MCNC: 18 MG/DL
WBC # BLD AUTO: 7.3 X10(3)/MCL (ref 4.5–11.5)

## 2025-05-28 PROCEDURE — 36415 COLL VENOUS BLD VENIPUNCTURE: CPT

## 2025-05-28 PROCEDURE — 80061 LIPID PANEL: CPT

## 2025-05-28 PROCEDURE — 85025 COMPLETE CBC W/AUTO DIFF WBC: CPT

## 2025-05-28 PROCEDURE — 81003 URINALYSIS AUTO W/O SCOPE: CPT

## 2025-05-28 PROCEDURE — 80053 COMPREHEN METABOLIC PANEL: CPT

## 2025-06-06 ENCOUNTER — OFFICE VISIT (OUTPATIENT)
Dept: FAMILY MEDICINE | Facility: CLINIC | Age: 75
End: 2025-06-06
Payer: MEDICARE

## 2025-06-06 ENCOUNTER — TELEPHONE (OUTPATIENT)
Dept: FAMILY MEDICINE | Facility: CLINIC | Age: 75
End: 2025-06-06

## 2025-06-06 VITALS
TEMPERATURE: 99 F | HEIGHT: 62 IN | OXYGEN SATURATION: 96 % | SYSTOLIC BLOOD PRESSURE: 128 MMHG | HEART RATE: 66 BPM | DIASTOLIC BLOOD PRESSURE: 78 MMHG | RESPIRATION RATE: 16 BRPM | BODY MASS INDEX: 26.09 KG/M2 | WEIGHT: 141.81 LBS

## 2025-06-06 DIAGNOSIS — J45.909 ASTHMA DUE TO ENVIRONMENTAL ALLERGIES: ICD-10-CM

## 2025-06-06 DIAGNOSIS — J44.1 COPD EXACERBATION: ICD-10-CM

## 2025-06-06 DIAGNOSIS — I70.0 ATHEROSCLEROSIS OF AORTA: ICD-10-CM

## 2025-06-06 DIAGNOSIS — J44.9 MODERATE COPD (CHRONIC OBSTRUCTIVE PULMONARY DISEASE): ICD-10-CM

## 2025-06-06 DIAGNOSIS — J44.1 COPD WITH ACUTE EXACERBATION: ICD-10-CM

## 2025-06-06 DIAGNOSIS — E78.2 MIXED HYPERLIPIDEMIA: Primary | ICD-10-CM

## 2025-06-06 RX ORDER — IPRATROPIUM BROMIDE AND ALBUTEROL SULFATE 2.5; .5 MG/3ML; MG/3ML
3 SOLUTION RESPIRATORY (INHALATION) EVERY 6 HOURS PRN
Qty: 75 ML | Refills: 3 | Status: SHIPPED | OUTPATIENT
Start: 2025-06-06 | End: 2026-06-06

## 2025-06-06 RX ORDER — BENZONATATE 200 MG/1
200 CAPSULE ORAL
COMMUNITY
Start: 2025-05-27 | End: 2025-06-06 | Stop reason: SDUPTHER

## 2025-06-06 RX ORDER — BENZONATATE 200 MG/1
200 CAPSULE ORAL 2 TIMES DAILY PRN
Qty: 60 CAPSULE | Refills: 2 | Status: SHIPPED | OUTPATIENT
Start: 2025-06-06

## 2025-06-07 DIAGNOSIS — E78.2 MIXED HYPERLIPIDEMIA: ICD-10-CM

## 2025-06-09 RX ORDER — ATORVASTATIN CALCIUM 10 MG/1
10 TABLET, FILM COATED ORAL NIGHTLY
Qty: 90 TABLET | Refills: 3 | Status: SHIPPED | OUTPATIENT
Start: 2025-06-09

## 2025-06-16 ENCOUNTER — TELEPHONE (OUTPATIENT)
Dept: FAMILY MEDICINE | Facility: CLINIC | Age: 75
End: 2025-06-16
Payer: MEDICARE

## 2025-06-16 NOTE — TELEPHONE ENCOUNTER
----- Message from Fior sent at 6/16/2025  2:57 PM CDT -----  Regarding: Med advice  .Who Called: Reji Dozier    Caller is requesting assistance/information from provider's office.    Symptoms (please be specific):    How long has patient had these symptoms:    List of preferred pharmacies on file (remove unneeded): [unfilled]  If different, enter pharmacy into here including location and phone number:       Preferred Method of Contact: Phone Call  Patient's Preferred Phone Number on File: [phone]   Best Call Back Number, if different:  Additional Information: Pt wants Dr Welch to be aware that he started his Omega 3..

## 2025-07-05 ENCOUNTER — TELEPHONE (OUTPATIENT)
Dept: FAMILY MEDICINE | Facility: CLINIC | Age: 75
End: 2025-07-05
Payer: MEDICARE

## 2025-07-05 NOTE — LETTER
[facility]  [address] [place] [address], [address]  [facility] [address]  Phone: [phone] July 5, 2025     Devan Michelle MD  [facility] [address]    Patient: Reji Dozier   MR Number: [id number]   YOB: 1950   Date of Visit: 7/5/2025       Dear Miguel Angel:    I am referring my patient, Reji Dozier, to you for evaluation of Mixed hyperlipidemia .     I appreciate your assistance in his care and look forward to your findings and recommendations.    Sincerely,                           Wilton Welch MD            Please fax confirmation of appointment date and time to [phone].  Thanks

## 2025-07-15 ENCOUNTER — TELEPHONE (OUTPATIENT)
Dept: FAMILY MEDICINE | Facility: CLINIC | Age: 75
End: 2025-07-15
Payer: MEDICARE

## 2025-07-15 NOTE — TELEPHONE ENCOUNTER
Copied from CRM #[id number]. Topic: General Inquiry - Patient Advice  >> Jul 15, 2025 11:39 AM Mahad wrote:  Who Called: Reji Dozier    Caller is requesting assistance/information from provider's office.    Symptoms (please be specific):    How long has patient had these symptoms:    List of preferred pharmacies on file (remove unneeded): [unfilled]  If different, enter pharmacy into here including location and phone number:       Preferred Method of Contact: Phone Call  Patient's Preferred Phone Number on File: [phone]   Best Call Back Number, if different:  Additional Information: pt called to inform go see  in September (cardiologist) pls advise

## 2025-08-18 DIAGNOSIS — R05.9 COUGH, UNSPECIFIED TYPE: Primary | ICD-10-CM

## 2025-08-19 RX ORDER — BENZONATATE 200 MG/1
200 CAPSULE ORAL
Qty: 30 CAPSULE | Refills: 5 | Status: SHIPPED | OUTPATIENT
Start: 2025-08-19

## (undated) DEVICE — GLOVE PROTEXIS PI CRM 6.5

## (undated) DEVICE — NDL QUINCKE S/SU 22GA 5IN

## (undated) DEVICE — NDL FLTR 5MCRN BLNT TIP 18GX1

## (undated) DEVICE — BANDAGE SHEER STRIP 3/4X3IN

## (undated) DEVICE — Device

## (undated) DEVICE — APPLICATOR CHLORAPREP ORN 26ML

## (undated) DEVICE — KIT SURGICAL TURNOVER

## (undated) DEVICE — SYR 3CC LUER LOC

## (undated) DEVICE — SYR EPILOR LUER-LOK LOR 7ML

## (undated) DEVICE — SYR 10CC LUER LOCK

## (undated) DEVICE — POSITIONER HEAD ADULT

## (undated) DEVICE — NDL SAFETY 25G X 1.5 ECLIPSE

## (undated) DEVICE — DRAPE UTILITY W/ TAPE 20X30IN

## (undated) DEVICE — SET SMARTSITE EXT SMALLBORE NF

## (undated) DEVICE — DRAPE MEDIUM SHEET 40X70IN

## (undated) DEVICE — SYR DISP LL 5CC

## (undated) DEVICE — SHEET DRAPE MEDIUM

## (undated) DEVICE — NDL SPINAL 22GA 3.5 IN QUINCKE

## (undated) DEVICE — SYR POSIFLUSH NACL PREFIL 10ML

## (undated) DEVICE — BANDAGE ADHESIVE FABRIC 2X4

## (undated) DEVICE — DRAPE TOWEL TIBURON 19X30IN

## (undated) DEVICE — DRESSING LEUKOPLAST FLEX 1X3IN